# Patient Record
Sex: MALE | Race: WHITE | Employment: FULL TIME | URBAN - METROPOLITAN AREA
[De-identification: names, ages, dates, MRNs, and addresses within clinical notes are randomized per-mention and may not be internally consistent; named-entity substitution may affect disease eponyms.]

---

## 2017-01-06 ENCOUNTER — OFFICE VISIT (OUTPATIENT)
Dept: FAMILY MEDICINE CLINIC | Facility: CLINIC | Age: 37
End: 2017-01-06

## 2017-01-06 VITALS
WEIGHT: 237 LBS | HEART RATE: 77 BPM | BODY MASS INDEX: 35 KG/M2 | SYSTOLIC BLOOD PRESSURE: 156 MMHG | DIASTOLIC BLOOD PRESSURE: 90 MMHG | TEMPERATURE: 99 F | OXYGEN SATURATION: 98 % | RESPIRATION RATE: 12 BRPM

## 2017-01-06 DIAGNOSIS — J03.90 TONSILLITIS: ICD-10-CM

## 2017-01-06 DIAGNOSIS — J02.9 SORE THROAT: Primary | ICD-10-CM

## 2017-01-06 DIAGNOSIS — I10 ESSENTIAL HYPERTENSION, BENIGN: ICD-10-CM

## 2017-01-06 DIAGNOSIS — H65.92 OTITIS MEDIA WITH EFFUSION, LEFT: ICD-10-CM

## 2017-01-06 PROCEDURE — 99213 OFFICE O/P EST LOW 20 MIN: CPT | Performed by: NURSE PRACTITIONER

## 2017-01-06 RX ORDER — FLUTICASONE PROPIONATE 50 MCG
1 SPRAY, SUSPENSION (ML) NASAL 2 TIMES DAILY
Qty: 1 BOTTLE | Refills: 1 | Status: SHIPPED | OUTPATIENT
Start: 2017-01-06 | End: 2017-02-05

## 2017-01-06 RX ORDER — AMOXICILLIN 875 MG/1
875 TABLET, COATED ORAL 2 TIMES DAILY
Qty: 20 TABLET | Refills: 0 | Status: SHIPPED | OUTPATIENT
Start: 2017-01-06 | End: 2017-01-16

## 2017-01-06 NOTE — PATIENT INSTRUCTIONS
·  PLAN: Amoxicillin,take as directed. Finish all the medication even if you feel better. · Probiotics daily during antibiotic use will help decrease stomach upset and restore good bacteria to the gut. · Salt water gargles (1 tsp.  Salt in 6 oz lukewarm

## 2017-01-06 NOTE — PROGRESS NOTES
HPI:   Neel Shipley is a 31year old male who presents with ill symptoms for  3  weeks. He was seen in our office on 12/26 with URI complaints and was sent to the ER for hypertension, not controlled, no medications.  He was started on Metoprolol and adv nausea or abdominal pain, appetite down  NEURO: admits to headaches    EXAM:   /98 mmHg  Pulse 77  Temp(Src) 98.7 °F (37.1 °C) (Oral)  Resp 12  Wt 237 lb  SpO2 98%  GENERAL: well developed, well nourished,in no apparent distress, appears congested  H congestion in nose. · Hydrate! (cold or hot based on comfort). Drink lots of water or other non dehydrating liquids to help with illness. Salty foods and soups can help with throat pain and swelling as well.    · Hand washing-use hand  or wash ha

## 2017-01-13 ENCOUNTER — PRIOR ORIGINAL RECORDS (OUTPATIENT)
Dept: OTHER | Age: 37
End: 2017-01-13

## 2017-01-17 ENCOUNTER — PRIOR ORIGINAL RECORDS (OUTPATIENT)
Dept: OTHER | Age: 37
End: 2017-01-17

## 2017-01-31 ENCOUNTER — MYAURORA ACCOUNT LINK (OUTPATIENT)
Dept: OTHER | Age: 37
End: 2017-01-31

## 2017-01-31 ENCOUNTER — HOSPITAL ENCOUNTER (OUTPATIENT)
Dept: CV DIAGNOSTICS | Facility: HOSPITAL | Age: 37
Discharge: HOME | End: 2017-01-31
Attending: INTERNAL MEDICINE

## 2017-01-31 DIAGNOSIS — I10 BENIGN HYPERTENSION: ICD-10-CM

## 2017-01-31 DIAGNOSIS — R06.00 DYSPNEA, UNSPECIFIED TYPE: ICD-10-CM

## 2017-02-16 ENCOUNTER — OFFICE VISIT (OUTPATIENT)
Dept: FAMILY MEDICINE CLINIC | Facility: CLINIC | Age: 37
End: 2017-02-16

## 2017-02-16 VITALS
RESPIRATION RATE: 20 BRPM | SYSTOLIC BLOOD PRESSURE: 130 MMHG | DIASTOLIC BLOOD PRESSURE: 100 MMHG | HEIGHT: 70 IN | TEMPERATURE: 98 F | WEIGHT: 235 LBS | OXYGEN SATURATION: 98 % | BODY MASS INDEX: 33.64 KG/M2 | HEART RATE: 78 BPM

## 2017-02-16 DIAGNOSIS — J01.90 ACUTE SINUSITIS TREATED WITH ANTIBIOTICS IN THE PAST 60 DAYS: ICD-10-CM

## 2017-02-16 DIAGNOSIS — J20.9 BRONCHITIS WITH BRONCHOSPASM: Primary | ICD-10-CM

## 2017-02-16 PROCEDURE — 99213 OFFICE O/P EST LOW 20 MIN: CPT | Performed by: NURSE PRACTITIONER

## 2017-02-16 RX ORDER — ALBUTEROL SULFATE 90 UG/1
2 AEROSOL, METERED RESPIRATORY (INHALATION) EVERY 4 HOURS PRN
Qty: 1 INHALER | Refills: 0 | Status: SHIPPED | OUTPATIENT
Start: 2017-02-16 | End: 2017-03-02

## 2017-02-16 RX ORDER — LISINOPRIL 10 MG/1
TABLET ORAL
Refills: 6 | COMMUNITY
Start: 2017-02-07 | End: 2019-03-20

## 2017-02-16 RX ORDER — CODEINE PHOSPHATE AND GUAIFENESIN 10; 100 MG/5ML; MG/5ML
5 SOLUTION ORAL NIGHTLY PRN
Qty: 118 ML | Refills: 0 | Status: SHIPPED | OUTPATIENT
Start: 2017-02-16 | End: 2017-02-23

## 2017-02-16 RX ORDER — PREDNISONE 20 MG/1
20 TABLET ORAL 2 TIMES DAILY
Qty: 10 TABLET | Refills: 0 | Status: SHIPPED | OUTPATIENT
Start: 2017-02-16 | End: 2017-02-21

## 2017-02-16 RX ORDER — DOXYCYCLINE HYCLATE 100 MG
100 TABLET ORAL 2 TIMES DAILY
Qty: 14 TABLET | Refills: 0 | Status: SHIPPED | OUTPATIENT
Start: 2017-02-16 | End: 2017-02-23

## 2017-02-16 RX ORDER — BENZONATATE 200 MG/1
200 CAPSULE ORAL 3 TIMES DAILY PRN
Qty: 20 CAPSULE | Refills: 0 | Status: SHIPPED | OUTPATIENT
Start: 2017-02-16 | End: 2017-02-23

## 2017-02-16 NOTE — PATIENT INSTRUCTIONS
Humidifier in room  Sleep propped  Push fluids  Limit dairy  Mucinex as directed  Continue nasal spray    Bronchitis, Antibiotic Treatment (Adult)    Bronchitis is an infection of the air passages (bronchial tubes) in your lungs.  It often occurs when you · Over-the-counter cough, cold, and sore-throat medicines will not shorten the length of the illness, but they may be helpful to reduce symptoms. (Note: Do not use decongestants if you have high blood pressure.)  · Finish all antibiotic medicine.  Do this e Bronchospasm is due to irritation, inflammation, or allergic reaction of the airways. People with asthma get bronchospasm. However, not everyone with bronchospasm has asthma.   Being exposed to harmful fumes, a recent case of bronchitis, exercise, or a flar · You are coughing up lots of dark-colored sputum (mucus). · You do not start to improve within 24 hours.   Call 911, or get immediate medical care  Contact emergency services if any of these occur:  · Coughing up bloody sputum (mucus)  · Chest pain with e · Over-the-counter decongestants may be used unless a similar medicine was prescribed. Nasal sprays work the fastest. Use one that contains phenylephrine or oxymetazoline. First blow the nose gently. Then use the spray.  Do not use these medicines more ofte © 2205-4962 [address], [address] [place]. All rights reserved. This information is not intended as a substitute for professional medical care. Always follow your healthcare professional's instructions.

## 2017-02-16 NOTE — PROGRESS NOTES
CHIEF COMPLAINT:   Patient presents with:  Chest Congestion: sinus pressure, post nasal drip and coughing x 12/25 on/off        HPI:   Verenice Smart is a 31year old male who presents for cough for  6  weeks.   Cough started gradually and is described as Smokeless Status: Never Used                        Comment: pt smoked for 15 years    Alcohol Use: Yes           2.0 - 3.0 oz/week       4-6 Cans of beer per week       REVIEW OF SYSTEMS:   GENERAL: no fever  SKIN: No rashes, or other skin lesions.    EY Sig: Take 1 capsule (200 mg total) by mouth 3 (three) times daily as needed. guaiFENesin-codeine (CHERATUSSIN AC) 100-10 MG/5ML Oral Solution 118 mL 0      Sig: Take 5 mL by mouth nightly as needed for cough.       Doxycycline Hyclate 100 MG Oral Ta · You may use over-the-counter medicines to control fever or pain, unless another medicine was prescribed.  (Note: If you have chronic liver or kidney disease or have ever had a stomach ulcer or gastrointestinal bleeding, talk with your healthcare provider · Worsening weakness, drowsiness, headache, or stiff neck  · Trouble breathing, wheezing, or pain with breathing  Date Last Reviewed: 9/13/2015  © 2683-3493 The [address] [place] [address], [address] [place] [address]. All rights reserved.  This inf · If you were given an inhaler, use it exactly as directed. If you need to use it more often than prescribed, your condition may be getting worse. Contact your healthcare provider. Follow-up care  Follow up with your healthcare provider, or as advised.   Pastor Lagos · Take the full course of antibiotics as instructed. Do not stop taking them, even if you feel better. · Drink plenty of water, hot tea, and other liquids. This may help thin mucus. It also may promote sinus drainage.   · Heat may help soothe painful areas Call your healthcare provider if any of these occur:  · Facial pain or headache becoming more severe  · Stiff neck  · Unusual drowsiness or confusion  · Swelling of the forehead or eyelids  · Vision problems, including blurred or double vision  · Fever of

## 2017-03-03 ENCOUNTER — PRIOR ORIGINAL RECORDS (OUTPATIENT)
Dept: OTHER | Age: 37
End: 2017-03-03

## 2017-03-03 ENCOUNTER — HOSPITAL ENCOUNTER (OUTPATIENT)
Dept: GENERAL RADIOLOGY | Facility: HOSPITAL | Age: 37
Discharge: HOME | End: 2017-03-03
Attending: INTERNAL MEDICINE
Payer: COMMERCIAL

## 2017-03-03 ENCOUNTER — MYAURORA ACCOUNT LINK (OUTPATIENT)
Dept: OTHER | Age: 37
End: 2017-03-03

## 2017-03-03 ENCOUNTER — APPOINTMENT (OUTPATIENT)
Dept: SLEEP CENTER | Facility: HOSPITAL | Age: 37
End: 2017-03-03
Attending: INTERNAL MEDICINE
Payer: COMMERCIAL

## 2017-03-03 DIAGNOSIS — R06.00 DYSPNEA, PAROXYSMAL NOCTURNAL: ICD-10-CM

## 2017-03-03 DIAGNOSIS — R05.9 COUGH: ICD-10-CM

## 2017-03-03 PROCEDURE — 71020 XR CHEST PA + LAT CHEST (CPT=71020): CPT

## 2017-03-06 ENCOUNTER — LAB ENCOUNTER (OUTPATIENT)
Dept: LAB | Age: 37
End: 2017-03-06
Attending: INTERNAL MEDICINE
Payer: COMMERCIAL

## 2017-03-06 DIAGNOSIS — E78.2 MIXED HYPERLIPIDEMIA: ICD-10-CM

## 2017-03-06 DIAGNOSIS — I10 ESSENTIAL HYPERTENSION, MALIGNANT: Primary | ICD-10-CM

## 2017-03-06 PROCEDURE — 80061 LIPID PANEL: CPT

## 2017-03-06 PROCEDURE — 36415 COLL VENOUS BLD VENIPUNCTURE: CPT

## 2017-03-06 PROCEDURE — 84443 ASSAY THYROID STIM HORMONE: CPT

## 2017-03-06 PROCEDURE — 80053 COMPREHEN METABOLIC PANEL: CPT

## 2017-03-07 LAB
ALBUMIN SERPL-MCNC: 4.4 G/DL (ref 3.5–4.8)
ALP LIVER SERPL-CCNC: 72 U/L (ref 45–117)
ALT SERPL-CCNC: 147 U/L (ref 17–63)
AST SERPL-CCNC: 57 U/L (ref 15–41)
BILIRUB SERPL-MCNC: 0.5 MG/DL (ref 0.1–2)
BUN BLD-MCNC: 9 MG/DL (ref 8–20)
CALCIUM BLD-MCNC: 9.4 MG/DL (ref 8.3–10.3)
CHLORIDE: 109 MMOL/L (ref 101–111)
CHOLEST SMN-MCNC: 277 MG/DL (ref ?–200)
CO2: 24 MMOL/L (ref 22–32)
CREAT BLD-MCNC: 0.95 MG/DL (ref 0.7–1.3)
GLUCOSE BLD-MCNC: 111 MG/DL (ref 70–99)
HDLC SERPL-MCNC: 39 MG/DL (ref 45–?)
HDLC SERPL: 7.1 {RATIO} (ref ?–4.97)
LDLC SERPL CALC-MCNC: 196 MG/DL (ref ?–130)
M PROTEIN MFR SERPL ELPH: 7.5 G/DL (ref 6.1–8.3)
NONHDLC SERPL-MCNC: 238 MG/DL (ref ?–130)
POTASSIUM SERPL-SCNC: 4.3 MMOL/L (ref 3.6–5.1)
SODIUM SERPL-SCNC: 142 MMOL/L (ref 136–144)
TRIGLYCERIDES: 209 MG/DL (ref ?–150)
TSI SER-ACNC: 1.74 MIU/ML (ref 0.35–5.5)
VLDL: 42 MG/DL (ref 5–40)

## 2017-03-08 ENCOUNTER — PRIOR ORIGINAL RECORDS (OUTPATIENT)
Dept: OTHER | Age: 37
End: 2017-03-08

## 2017-03-20 ENCOUNTER — PRIOR ORIGINAL RECORDS (OUTPATIENT)
Dept: OTHER | Age: 37
End: 2017-03-20

## 2019-03-01 VITALS
WEIGHT: 234 LBS | SYSTOLIC BLOOD PRESSURE: 156 MMHG | BODY MASS INDEX: 34.66 KG/M2 | HEIGHT: 69 IN | HEART RATE: 76 BPM | DIASTOLIC BLOOD PRESSURE: 102 MMHG

## 2019-03-01 VITALS
HEART RATE: 76 BPM | SYSTOLIC BLOOD PRESSURE: 158 MMHG | WEIGHT: 236 LBS | HEIGHT: 69 IN | BODY MASS INDEX: 34.96 KG/M2 | DIASTOLIC BLOOD PRESSURE: 106 MMHG

## 2019-03-20 ENCOUNTER — OFFICE VISIT (OUTPATIENT)
Dept: FAMILY MEDICINE CLINIC | Facility: CLINIC | Age: 39
End: 2019-03-20
Payer: COMMERCIAL

## 2019-03-20 VITALS
DIASTOLIC BLOOD PRESSURE: 98 MMHG | RESPIRATION RATE: 20 BRPM | WEIGHT: 240 LBS | OXYGEN SATURATION: 99 % | HEIGHT: 70 IN | HEART RATE: 98 BPM | BODY MASS INDEX: 34.36 KG/M2 | SYSTOLIC BLOOD PRESSURE: 178 MMHG | TEMPERATURE: 98 F

## 2019-03-20 DIAGNOSIS — J02.9 SORE THROAT: Primary | ICD-10-CM

## 2019-03-20 DIAGNOSIS — Z20.818 EXPOSURE TO STREP THROAT: ICD-10-CM

## 2019-03-20 DIAGNOSIS — R09.81 SINUS CONGESTION: ICD-10-CM

## 2019-03-20 DIAGNOSIS — I10 ESSENTIAL HYPERTENSION: ICD-10-CM

## 2019-03-20 DIAGNOSIS — H69.83 DYSFUNCTION OF BOTH EUSTACHIAN TUBES: ICD-10-CM

## 2019-03-20 LAB
CONTROL LINE PRESENT WITH A CLEAR BACKGROUND (YES/NO): YES YES/NO
STREP GRP A CUL-SCR: NEGATIVE

## 2019-03-20 PROCEDURE — 99213 OFFICE O/P EST LOW 20 MIN: CPT | Performed by: NURSE PRACTITIONER

## 2019-03-20 PROCEDURE — 87880 STREP A ASSAY W/OPTIC: CPT | Performed by: NURSE PRACTITIONER

## 2019-03-20 RX ORDER — AZITHROMYCIN 250 MG/1
TABLET, FILM COATED ORAL
Qty: 6 TABLET | Refills: 0 | Status: SHIPPED | OUTPATIENT
Start: 2019-03-20 | End: 2021-03-11 | Stop reason: ALTCHOICE

## 2019-03-20 RX ORDER — FLUTICASONE PROPIONATE 50 MCG
1 SPRAY, SUSPENSION (ML) NASAL 2 TIMES DAILY
Qty: 1 BOTTLE | Refills: 1 | Status: SHIPPED | OUTPATIENT
Start: 2019-03-20 | End: 2019-04-19

## 2019-03-20 NOTE — PROGRESS NOTES
HPI:   Nanette Schreiber is a 42year old male who presents with ill symptoms for  3  days. Patient reports sore throat, congestion, low grade fever, ear pain, sinus pain, OTC cold meds have not been helping.  Exposure to strep through daughter three days pr HEENT: atraumatic, normocephalic,ears very full and clear, nares with mild rhinorrhea, throat with mild erythema and uvular/tonsillar edema. Uvuvla midline. No sinus tenderness with palpation.   NECK: supple,positive anterior cervical adenopathy  LUNGS: cl ·  PLAN: Zithromax, take as directed. Finish all the medication even if you feel better. · Probiotics or yogurt daily during antibiotic use will help decrease stomach upset and restore good bacteria to the gut.  Take with meals but separate from Zithromax Eating for your heart doesn’t have to be hard or boring. You just need to know how to make healthier choices. The DASH eating plan has been developed to help you do just that. DASH stands for Dietary Approaches to Stop Hypertension.  It is a plan that has b Best choices: Lean poultry and fish. Trim away visible fat. Broil, grill, roast, or boil instead of frying. Remove skin from poultry before eating.  Limit how much red meat you eat.  Nuts, seeds, beans  Servings: 4 to 5 a week  A serving is:  · One-third cu

## 2019-03-20 NOTE — PATIENT INSTRUCTIONS
·  PLAN: Zithromax, take as directed. Finish all the medication even if you feel better. · Probiotics or yogurt daily during antibiotic use will help decrease stomach upset and restore good bacteria to the gut.  Take with meals but separate from Zithromax lower your risk for cancer, heart disease, osteoporosis, and diabetes. Choosing from each food group  Choose foods from each of the food groups below each day. Try to get the recommended number of servings for each food group.  The serving numbers are base added, lentils, kidney beans, garbanzo beans, and whole mancia beans.    Fats and oils  Servings: 2 to 3 a day  A serving is:  · 1 teaspoon vegetable oil  · 1 teaspoon soft margarine  · 1 tablespoon mayonnaise  · 2 tablespoons salad dressing  Best choices: N

## 2021-03-11 ENCOUNTER — APPOINTMENT (OUTPATIENT)
Dept: CT IMAGING | Facility: HOSPITAL | Age: 41
End: 2021-03-11
Attending: EMERGENCY MEDICINE
Payer: COMMERCIAL

## 2021-03-11 ENCOUNTER — HOSPITAL ENCOUNTER (EMERGENCY)
Facility: HOSPITAL | Age: 41
Discharge: HOME | End: 2021-03-11
Attending: EMERGENCY MEDICINE
Payer: COMMERCIAL

## 2021-03-11 VITALS
RESPIRATION RATE: 21 BRPM | TEMPERATURE: 98 F | SYSTOLIC BLOOD PRESSURE: 173 MMHG | OXYGEN SATURATION: 96 % | HEIGHT: 70 IN | WEIGHT: 255 LBS | HEART RATE: 82 BPM | BODY MASS INDEX: 36.51 KG/M2 | DIASTOLIC BLOOD PRESSURE: 118 MMHG

## 2021-03-11 DIAGNOSIS — R51.9 ACUTE NONINTRACTABLE HEADACHE, UNSPECIFIED HEADACHE TYPE: Primary | ICD-10-CM

## 2021-03-11 DIAGNOSIS — I10 ESSENTIAL HYPERTENSION: ICD-10-CM

## 2021-03-11 LAB
ALBUMIN SERPL-MCNC: 4.3 G/DL (ref 3.4–5)
ALBUMIN/GLOB SERPL: 1.2 {RATIO} (ref 1–2)
ALP LIVER SERPL-CCNC: 82 U/L
ALT SERPL-CCNC: 93 U/L
ANION GAP SERPL CALC-SCNC: 7 MMOL/L (ref 0–18)
AST SERPL-CCNC: 43 U/L (ref 15–37)
BASOPHILS # BLD AUTO: 0.14 X10(3) UL (ref 0–0.2)
BASOPHILS NFR BLD AUTO: 1.1 %
BILIRUB SERPL-MCNC: 0.8 MG/DL (ref 0.1–2)
BUN BLD-MCNC: 16 MG/DL (ref 7–18)
BUN/CREAT SERPL: 16.3 (ref 10–20)
CALCIUM BLD-MCNC: 9.4 MG/DL (ref 8.5–10.1)
CHLORIDE SERPL-SCNC: 108 MMOL/L (ref 98–112)
CO2 SERPL-SCNC: 24 MMOL/L (ref 21–32)
CREAT BLD-MCNC: 0.98 MG/DL
DEPRECATED RDW RBC AUTO: 37.8 FL (ref 35.1–46.3)
EOSINOPHIL # BLD AUTO: 0.06 X10(3) UL (ref 0–0.7)
EOSINOPHIL NFR BLD AUTO: 0.5 %
ERYTHROCYTE [DISTWIDTH] IN BLOOD BY AUTOMATED COUNT: 13 % (ref 11–15)
GLOBULIN PLAS-MCNC: 3.7 G/DL (ref 2.8–4.4)
GLUCOSE BLD-MCNC: 126 MG/DL (ref 70–99)
HCT VFR BLD AUTO: 41.3 %
HGB BLD-MCNC: 14.9 G/DL
IMM GRANULOCYTES # BLD AUTO: 0.09 X10(3) UL (ref 0–1)
IMM GRANULOCYTES NFR BLD: 0.7 %
LYMPHOCYTES # BLD AUTO: 1.12 X10(3) UL (ref 1–4)
LYMPHOCYTES NFR BLD AUTO: 9.1 %
M PROTEIN MFR SERPL ELPH: 8 G/DL (ref 6.4–8.2)
MCH RBC QN AUTO: 29.2 PG (ref 26–34)
MCHC RBC AUTO-ENTMCNC: 36.1 G/DL (ref 31–37)
MCV RBC AUTO: 80.8 FL
MONOCYTES # BLD AUTO: 0.63 X10(3) UL (ref 0.1–1)
MONOCYTES NFR BLD AUTO: 5.1 %
NEUTROPHILS # BLD AUTO: 10.21 X10 (3) UL (ref 1.5–7.7)
NEUTROPHILS # BLD AUTO: 10.21 X10(3) UL (ref 1.5–7.7)
NEUTROPHILS NFR BLD AUTO: 83.5 %
OSMOLALITY SERPL CALC.SUM OF ELEC: 291 MOSM/KG (ref 275–295)
PLATELET # BLD AUTO: 231 10(3)UL (ref 150–450)
POTASSIUM SERPL-SCNC: 3.6 MMOL/L (ref 3.5–5.1)
RBC # BLD AUTO: 5.11 X10(6)UL
SODIUM SERPL-SCNC: 139 MMOL/L (ref 136–145)
WBC # BLD AUTO: 12.3 X10(3) UL (ref 4–11)

## 2021-03-11 PROCEDURE — 96375 TX/PRO/DX INJ NEW DRUG ADDON: CPT

## 2021-03-11 PROCEDURE — 80053 COMPREHEN METABOLIC PANEL: CPT | Performed by: EMERGENCY MEDICINE

## 2021-03-11 PROCEDURE — 93010 ELECTROCARDIOGRAM REPORT: CPT

## 2021-03-11 PROCEDURE — 93005 ELECTROCARDIOGRAM TRACING: CPT

## 2021-03-11 PROCEDURE — 85025 COMPLETE CBC W/AUTO DIFF WBC: CPT | Performed by: EMERGENCY MEDICINE

## 2021-03-11 PROCEDURE — 96374 THER/PROPH/DIAG INJ IV PUSH: CPT

## 2021-03-11 PROCEDURE — 70450 CT HEAD/BRAIN W/O DYE: CPT | Performed by: EMERGENCY MEDICINE

## 2021-03-11 PROCEDURE — 96376 TX/PRO/DX INJ SAME DRUG ADON: CPT

## 2021-03-11 PROCEDURE — 99285 EMERGENCY DEPT VISIT HI MDM: CPT

## 2021-03-11 RX ORDER — LABETALOL HYDROCHLORIDE 5 MG/ML
20 INJECTION, SOLUTION INTRAVENOUS ONCE
Status: DISCONTINUED | OUTPATIENT
Start: 2021-03-11 | End: 2021-03-11

## 2021-03-11 RX ORDER — CLONIDINE HYDROCHLORIDE 0.1 MG/1
0.05 TABLET ORAL ONCE
Status: COMPLETED | OUTPATIENT
Start: 2021-03-11 | End: 2021-03-11

## 2021-03-11 RX ORDER — LOSARTAN POTASSIUM 50 MG/1
50 TABLET ORAL EVERY EVENING
Qty: 30 TABLET | Refills: 0 | Status: ON HOLD | OUTPATIENT
Start: 2021-03-11 | End: 2021-03-14

## 2021-03-11 RX ORDER — LABETALOL HYDROCHLORIDE 5 MG/ML
10 INJECTION, SOLUTION INTRAVENOUS ONCE
Status: COMPLETED | OUTPATIENT
Start: 2021-03-11 | End: 2021-03-11

## 2021-03-11 RX ORDER — AMLODIPINE BESYLATE 5 MG/1
5 TABLET ORAL DAILY
Qty: 30 TABLET | Refills: 0 | Status: ON HOLD | OUTPATIENT
Start: 2021-03-11 | End: 2021-03-14

## 2021-03-11 RX ORDER — ONDANSETRON 2 MG/ML
4 INJECTION INTRAMUSCULAR; INTRAVENOUS ONCE
Status: COMPLETED | OUTPATIENT
Start: 2021-03-11 | End: 2021-03-11

## 2021-03-11 RX ORDER — CLONIDINE HYDROCHLORIDE 0.1 MG/1
0.1 TABLET ORAL ONCE
Status: DISCONTINUED | OUTPATIENT
Start: 2021-03-11 | End: 2021-03-11

## 2021-03-11 NOTE — ED NOTES
Pt states that he has had elevated blood pressure in the past but BP meds have not worked so he stopped taking anything or having bp evaluated.

## 2021-03-11 NOTE — ED INITIAL ASSESSMENT (HPI)
Patient states 3-4 weeks ago had sensitivity to light and left hand weakness. Thought it was a migraine. Now c/o constant pain/pressure behind both eyes. Bowling yesterday and was losing his balance.  This morning again having balance issues, and vomited tw

## 2021-03-12 ENCOUNTER — TELEPHONE (OUTPATIENT)
Dept: FAMILY MEDICINE CLINIC | Facility: CLINIC | Age: 41
End: 2021-03-12

## 2021-03-12 ENCOUNTER — APPOINTMENT (OUTPATIENT)
Dept: MRI IMAGING | Age: 41
DRG: 064 | End: 2021-03-12
Attending: EMERGENCY MEDICINE
Payer: COMMERCIAL

## 2021-03-12 ENCOUNTER — OFFICE VISIT (OUTPATIENT)
Dept: FAMILY MEDICINE CLINIC | Facility: CLINIC | Age: 41
End: 2021-03-12
Payer: COMMERCIAL

## 2021-03-12 ENCOUNTER — HOSPITAL ENCOUNTER (INPATIENT)
Facility: HOSPITAL | Age: 41
LOS: 3 days | Discharge: HOME | DRG: 064 | End: 2021-03-15
Attending: EMERGENCY MEDICINE | Admitting: HOSPITALIST
Payer: COMMERCIAL

## 2021-03-12 DIAGNOSIS — I63.9 FOCAL INFARCTION OF BRAIN (HCC): ICD-10-CM

## 2021-03-12 DIAGNOSIS — Z02.9 ADMINISTRATIVE ENCOUNTER: Primary | ICD-10-CM

## 2021-03-12 DIAGNOSIS — N28.9 RENAL INSUFFICIENCY: ICD-10-CM

## 2021-03-12 DIAGNOSIS — I16.1 HYPERTENSIVE EMERGENCY: Primary | ICD-10-CM

## 2021-03-12 LAB
ALBUMIN SERPL-MCNC: 4.1 G/DL (ref 3.4–5)
ALBUMIN/GLOB SERPL: 1.2 {RATIO} (ref 1–2)
ALP LIVER SERPL-CCNC: 74 U/L
ALT SERPL-CCNC: 93 U/L
ANION GAP SERPL CALC-SCNC: 6 MMOL/L (ref 0–18)
AST SERPL-CCNC: 41 U/L (ref 15–37)
ATRIAL RATE: 106 BPM
ATRIAL RATE: 97 BPM
BASOPHILS # BLD AUTO: 0.13 X10(3) UL (ref 0–0.2)
BASOPHILS NFR BLD AUTO: 1.1 %
BILIRUB SERPL-MCNC: 0.8 MG/DL (ref 0.1–2)
BUN BLD-MCNC: 18 MG/DL (ref 7–18)
BUN/CREAT SERPL: 10.4 (ref 10–20)
CALCIUM BLD-MCNC: 9.4 MG/DL (ref 8.5–10.1)
CHLORIDE SERPL-SCNC: 103 MMOL/L (ref 98–112)
CO2 SERPL-SCNC: 28 MMOL/L (ref 21–32)
CREAT BLD-MCNC: 1.73 MG/DL
DEPRECATED RDW RBC AUTO: 39.8 FL (ref 35.1–46.3)
EOSINOPHIL # BLD AUTO: 0.24 X10(3) UL (ref 0–0.7)
EOSINOPHIL NFR BLD AUTO: 2 %
ERYTHROCYTE [DISTWIDTH] IN BLOOD BY AUTOMATED COUNT: 13.2 % (ref 11–15)
GLOBULIN PLAS-MCNC: 3.5 G/DL (ref 2.8–4.4)
GLUCOSE BLD-MCNC: 126 MG/DL (ref 70–99)
HCT VFR BLD AUTO: 40.9 %
HGB BLD-MCNC: 14 G/DL
IMM GRANULOCYTES # BLD AUTO: 0.06 X10(3) UL (ref 0–1)
IMM GRANULOCYTES NFR BLD: 0.5 %
LYMPHOCYTES # BLD AUTO: 2.46 X10(3) UL (ref 1–4)
LYMPHOCYTES NFR BLD AUTO: 20.3 %
M PROTEIN MFR SERPL ELPH: 7.6 G/DL (ref 6.4–8.2)
MCH RBC QN AUTO: 28.6 PG (ref 26–34)
MCHC RBC AUTO-ENTMCNC: 34.2 G/DL (ref 31–37)
MCV RBC AUTO: 83.6 FL
MONOCYTES # BLD AUTO: 0.78 X10(3) UL (ref 0.1–1)
MONOCYTES NFR BLD AUTO: 6.4 %
NEUTROPHILS # BLD AUTO: 8.47 X10 (3) UL (ref 1.5–7.7)
NEUTROPHILS # BLD AUTO: 8.47 X10(3) UL (ref 1.5–7.7)
NEUTROPHILS NFR BLD AUTO: 69.7 %
OSMOLALITY SERPL CALC.SUM OF ELEC: 287 MOSM/KG (ref 275–295)
P AXIS: 61 DEGREES
P AXIS: 66 DEGREES
P-R INTERVAL: 160 MS
P-R INTERVAL: 174 MS
PLATELET # BLD AUTO: 228 10(3)UL (ref 150–450)
POTASSIUM SERPL-SCNC: 3.6 MMOL/L (ref 3.5–5.1)
Q-T INTERVAL: 356 MS
Q-T INTERVAL: 366 MS
QRS DURATION: 100 MS
QRS DURATION: 92 MS
QTC CALCULATION (BEZET): 464 MS
QTC CALCULATION (BEZET): 472 MS
R AXIS: -3 DEGREES
R AXIS: 1 DEGREES
RBC # BLD AUTO: 4.89 X10(6)UL
SARS-COV-2 RNA RESP QL NAA+PROBE: NOT DETECTED
SODIUM SERPL-SCNC: 137 MMOL/L (ref 136–145)
T AXIS: 94 DEGREES
T AXIS: 96 DEGREES
TROPONIN I SERPL-MCNC: <0.045 NG/ML (ref ?–0.04)
VENTRICULAR RATE: 106 BPM
VENTRICULAR RATE: 97 BPM
WBC # BLD AUTO: 12.1 X10(3) UL (ref 4–11)

## 2021-03-12 PROCEDURE — 99223 1ST HOSP IP/OBS HIGH 75: CPT | Performed by: INTERNAL MEDICINE

## 2021-03-12 PROCEDURE — 70553 MRI BRAIN STEM W/O & W/DYE: CPT | Performed by: EMERGENCY MEDICINE

## 2021-03-12 RX ORDER — ATORVASTATIN CALCIUM 40 MG/1
40 TABLET, FILM COATED ORAL NIGHTLY
Status: DISCONTINUED | OUTPATIENT
Start: 2021-03-12 | End: 2021-03-15

## 2021-03-12 RX ORDER — LORAZEPAM 2 MG/ML
1 INJECTION INTRAMUSCULAR ONCE
Status: COMPLETED | OUTPATIENT
Start: 2021-03-12 | End: 2021-03-12

## 2021-03-12 RX ORDER — HYDRALAZINE HYDROCHLORIDE 20 MG/ML
10 INJECTION INTRAMUSCULAR; INTRAVENOUS EVERY 6 HOURS PRN
Status: DISCONTINUED | OUTPATIENT
Start: 2021-03-12 | End: 2021-03-15

## 2021-03-12 RX ORDER — LABETALOL HYDROCHLORIDE 5 MG/ML
20 INJECTION, SOLUTION INTRAVENOUS ONCE
Status: COMPLETED | OUTPATIENT
Start: 2021-03-12 | End: 2021-03-12

## 2021-03-12 RX ORDER — LOSARTAN POTASSIUM 100 MG/1
100 TABLET ORAL DAILY
Status: DISCONTINUED | OUTPATIENT
Start: 2021-03-12 | End: 2021-03-15

## 2021-03-12 RX ORDER — ONDANSETRON 2 MG/ML
4 INJECTION INTRAMUSCULAR; INTRAVENOUS EVERY 6 HOURS PRN
Status: DISCONTINUED | OUTPATIENT
Start: 2021-03-12 | End: 2021-03-15

## 2021-03-12 RX ORDER — ASPIRIN 325 MG
325 TABLET ORAL DAILY
Status: DISCONTINUED | OUTPATIENT
Start: 2021-03-12 | End: 2021-03-15

## 2021-03-12 RX ORDER — MECLIZINE HYDROCHLORIDE 25 MG/1
25 TABLET ORAL ONCE
Status: COMPLETED | OUTPATIENT
Start: 2021-03-12 | End: 2021-03-12

## 2021-03-12 RX ORDER — ENOXAPARIN SODIUM 100 MG/ML
40 INJECTION SUBCUTANEOUS DAILY
Status: DISCONTINUED | OUTPATIENT
Start: 2021-03-13 | End: 2021-03-15

## 2021-03-12 RX ORDER — AMLODIPINE BESYLATE 5 MG/1
10 TABLET ORAL ONCE
Status: COMPLETED | OUTPATIENT
Start: 2021-03-12 | End: 2021-03-12

## 2021-03-12 RX ORDER — HYDROCODONE BITARTRATE AND ACETAMINOPHEN 5; 325 MG/1; MG/1
2 TABLET ORAL EVERY 4 HOURS PRN
Status: DISCONTINUED | OUTPATIENT
Start: 2021-03-12 | End: 2021-03-15

## 2021-03-12 RX ORDER — HYDROCODONE BITARTRATE AND ACETAMINOPHEN 5; 325 MG/1; MG/1
1 TABLET ORAL EVERY 4 HOURS PRN
Status: DISCONTINUED | OUTPATIENT
Start: 2021-03-12 | End: 2021-03-15

## 2021-03-12 RX ORDER — AMLODIPINE BESYLATE 5 MG/1
5 TABLET ORAL DAILY
Status: DISCONTINUED | OUTPATIENT
Start: 2021-03-12 | End: 2021-03-14

## 2021-03-12 RX ORDER — HYDROCHLOROTHIAZIDE 12.5 MG/1
12.5 CAPSULE, GELATIN COATED ORAL DAILY
Status: DISCONTINUED | OUTPATIENT
Start: 2021-03-12 | End: 2021-03-14

## 2021-03-12 RX ORDER — ACETAMINOPHEN 325 MG/1
650 TABLET ORAL EVERY 4 HOURS PRN
Status: DISCONTINUED | OUTPATIENT
Start: 2021-03-12 | End: 2021-03-15

## 2021-03-12 RX ORDER — MECLIZINE HYDROCHLORIDE 25 MG/1
25 TABLET ORAL 3 TIMES DAILY PRN
Qty: 20 TABLET | Refills: 0 | Status: SHIPPED | OUTPATIENT
Start: 2021-03-12

## 2021-03-12 RX ORDER — MELATONIN
3 NIGHTLY PRN
Status: DISCONTINUED | OUTPATIENT
Start: 2021-03-12 | End: 2021-03-15

## 2021-03-12 RX ORDER — ASPIRIN 300 MG
300 SUPPOSITORY, RECTAL RECTAL DAILY
Status: DISCONTINUED | OUTPATIENT
Start: 2021-03-12 | End: 2021-03-14

## 2021-03-12 RX ORDER — POTASSIUM CHLORIDE 20 MEQ/1
40 TABLET, EXTENDED RELEASE ORAL EVERY 4 HOURS
Status: COMPLETED | OUTPATIENT
Start: 2021-03-12 | End: 2021-03-13

## 2021-03-12 NOTE — ED PROVIDER NOTES
Patient Seen in: [facility] Emergency Department      History   Patient presents with:  Dizziness    Stated Complaint: Dizziness, N/V    HPI/Subjective:   HPI    46 yr old M hx hypertension here with /169 and mild HA, photosensitivity and feel (36.6 °C) (Temporal)   Resp 21   Ht 177.8 cm (5' 10\")   Wt 115.7 kg   SpO2 96%   BMI 36.59 kg/m²         Physical Exam  Vitals and nursing note reviewed. HENT:      Head: Normocephalic and atraumatic.       Nose: Nose normal.      Mouth/Throat:      Mout order CBC WITH DIFFERENTIAL WITH PLATELET.   Procedure                               Abnormality         Status                     ---------                               -----------         ------                     CBC W/ DIFFERENTIAL[[id number]] probability of imminent or life-threatening deterioration which required my direct attention, intervention and personal management. Critical care time is exclusive of time spent on separately billable procedures.  Time includes review of laboratory data, ra

## 2021-03-12 NOTE — ED PROVIDER NOTES
Patient Seen in: [facility] Emergency Department In [place]      History   Patient presents with:  Dizziness    Stated Complaint: seen at [facility] yesterday for hypertension , still dizzy and nausea  almost like ve*    HPI/Subjective:   HPI    This is a 41-yea Types: 4 - 6 Cans of beer per week    Drug use: No             Review of Systems    Positive for stated complaint: seen at [facility] yesterday for hypertension , still dizzy and nausea  almost like ve*  Other systems are as noted in HPI.   Constitutional and krista Absolute Prelim 8.47 (*)     Neutrophil Absolute 8.47 (*)     All other components within normal limits   TROPONIN I - Normal   CBC WITH DIFFERENTIAL WITH PLATELET    Narrative:      The following orders were created for panel order CBC WITH DIFFERENTIAL WI follow-up needs repeat electrolytes as an outpatient he does not outpatient follow-up with his own primary care physician MRI is pending at the time patient's MRI is negative patient be discharged home with close follow-up.                    Disposition an

## 2021-03-12 NOTE — ED PROVIDER NOTES
Patient's blood pressure trended back elevated      I reviewed the results of the work-up with the patient. I am concerned about his blood pressure trending elevated again. He was not compliant with medication that had been prescribed yesterday.   Also co

## 2021-03-12 NOTE — ED INITIAL ASSESSMENT (HPI)
Seen yesterday at [facility] for dizziness and HTN- bp improved but dizziness and nausea remain- no vomiting today

## 2021-03-12 NOTE — PROGRESS NOTES
Triaged from the car. Pt presented with dizzy and vomiting. Was seen at [facility] yesterday and was found to be extremely hypertensive. Patient reports he is still feeling off balance and vomiting. These symptoms have not resolved from yesterday.   Has not pi

## 2021-03-12 NOTE — TELEPHONE ENCOUNTER
Pt was @ the [facility] ER yesterday and he was told to follow up with PCP within 1-2 days. Dr. Manjit Ramos schedule full when would she like to see him?

## 2021-03-13 ENCOUNTER — APPOINTMENT (OUTPATIENT)
Dept: CV DIAGNOSTICS | Facility: HOSPITAL | Age: 41
DRG: 064 | End: 2021-03-13
Attending: INTERNAL MEDICINE
Payer: COMMERCIAL

## 2021-03-13 ENCOUNTER — APPOINTMENT (OUTPATIENT)
Dept: CT IMAGING | Facility: HOSPITAL | Age: 41
DRG: 064 | End: 2021-03-13
Attending: Other
Payer: COMMERCIAL

## 2021-03-13 ENCOUNTER — APPOINTMENT (OUTPATIENT)
Dept: ULTRASOUND IMAGING | Facility: HOSPITAL | Age: 41
DRG: 064 | End: 2021-03-13
Attending: INTERNAL MEDICINE
Payer: COMMERCIAL

## 2021-03-13 LAB
ANION GAP SERPL CALC-SCNC: 8 MMOL/L (ref 0–18)
BASOPHILS # BLD AUTO: 0.12 X10(3) UL (ref 0–0.2)
BASOPHILS NFR BLD AUTO: 1.2 %
BUN BLD-MCNC: 14 MG/DL (ref 7–18)
BUN/CREAT SERPL: 13.2 (ref 10–20)
CALCIUM BLD-MCNC: 8.9 MG/DL (ref 8.5–10.1)
CHLORIDE SERPL-SCNC: 107 MMOL/L (ref 98–112)
CHOLEST SMN-MCNC: 264 MG/DL (ref ?–200)
CO2 SERPL-SCNC: 25 MMOL/L (ref 21–32)
CREAT BLD-MCNC: 1.06 MG/DL
DEPRECATED RDW RBC AUTO: 39.7 FL (ref 35.1–46.3)
EOSINOPHIL # BLD AUTO: 0.23 X10(3) UL (ref 0–0.7)
EOSINOPHIL NFR BLD AUTO: 2.4 %
ERYTHROCYTE [DISTWIDTH] IN BLOOD BY AUTOMATED COUNT: 13.3 % (ref 11–15)
EST. AVERAGE GLUCOSE BLD GHB EST-MCNC: 114 MG/DL (ref 68–126)
GLUCOSE BLD-MCNC: 112 MG/DL (ref 70–99)
GLUCOSE BLD-MCNC: 113 MG/DL (ref 70–99)
GLUCOSE BLD-MCNC: 114 MG/DL (ref 70–99)
GLUCOSE BLD-MCNC: 117 MG/DL (ref 70–99)
GLUCOSE BLD-MCNC: 97 MG/DL (ref 70–99)
HAV IGM SER QL: 2.5 MG/DL (ref 1.6–2.6)
HBA1C MFR BLD HPLC: 5.6 % (ref ?–5.7)
HCT VFR BLD AUTO: 40.9 %
HDLC SERPL-MCNC: 35 MG/DL (ref 40–59)
HGB BLD-MCNC: 14.2 G/DL
IMM GRANULOCYTES # BLD AUTO: 0.04 X10(3) UL (ref 0–1)
IMM GRANULOCYTES NFR BLD: 0.4 %
LDLC SERPL CALC-MCNC: 204 MG/DL (ref ?–100)
LYMPHOCYTES # BLD AUTO: 1.68 X10(3) UL (ref 1–4)
LYMPHOCYTES NFR BLD AUTO: 17.4 %
MCH RBC QN AUTO: 28.9 PG (ref 26–34)
MCHC RBC AUTO-ENTMCNC: 34.7 G/DL (ref 31–37)
MCV RBC AUTO: 83.3 FL
MONOCYTES # BLD AUTO: 0.74 X10(3) UL (ref 0.1–1)
MONOCYTES NFR BLD AUTO: 7.7 %
NEUTROPHILS # BLD AUTO: 6.82 X10 (3) UL (ref 1.5–7.7)
NEUTROPHILS # BLD AUTO: 6.82 X10(3) UL (ref 1.5–7.7)
NEUTROPHILS NFR BLD AUTO: 70.9 %
NONHDLC SERPL-MCNC: 229 MG/DL (ref ?–130)
OSMOLALITY SERPL CALC.SUM OF ELEC: 291 MOSM/KG (ref 275–295)
PLATELET # BLD AUTO: 212 10(3)UL (ref 150–450)
POTASSIUM SERPL-SCNC: 3.9 MMOL/L (ref 3.5–5.1)
RBC # BLD AUTO: 4.91 X10(6)UL
SODIUM SERPL-SCNC: 140 MMOL/L (ref 136–145)
TRIGL SERPL-MCNC: 124 MG/DL (ref 30–149)
VLDLC SERPL CALC-MCNC: 25 MG/DL (ref 0–30)
WBC # BLD AUTO: 9.6 X10(3) UL (ref 4–11)

## 2021-03-13 PROCEDURE — 93975 VASCULAR STUDY: CPT | Performed by: INTERNAL MEDICINE

## 2021-03-13 PROCEDURE — 76775 US EXAM ABDO BACK WALL LIM: CPT | Performed by: INTERNAL MEDICINE

## 2021-03-13 PROCEDURE — 99253 IP/OBS CNSLTJ NEW/EST LOW 45: CPT | Performed by: INTERNAL MEDICINE

## 2021-03-13 PROCEDURE — 70496 CT ANGIOGRAPHY HEAD: CPT | Performed by: OTHER

## 2021-03-13 PROCEDURE — 93306 TTE W/DOPPLER COMPLETE: CPT | Performed by: INTERNAL MEDICINE

## 2021-03-13 PROCEDURE — 70498 CT ANGIOGRAPHY NECK: CPT | Performed by: OTHER

## 2021-03-13 PROCEDURE — 99255 IP/OBS CONSLTJ NEW/EST HI 80: CPT | Performed by: OTHER

## 2021-03-13 PROCEDURE — 99232 SBSQ HOSP IP/OBS MODERATE 35: CPT | Performed by: INTERNAL MEDICINE

## 2021-03-13 RX ORDER — CARVEDILOL 3.12 MG/1
3.12 TABLET ORAL 2 TIMES DAILY WITH MEALS
Status: DISCONTINUED | OUTPATIENT
Start: 2021-03-13 | End: 2021-03-14

## 2021-03-13 NOTE — CONSULTS
[address]  Neurology Consultation    Date of consult: 3/13/2021    Reason for consult: HTN urgency    HPI: Eli Harrison is a 46year old male with past medical history as listed below presents with dizziness, high blood pressure.  The patient infection      Past Surgical History:   Procedure Laterality Date   • VASECTOMY  12/4/14    Dr. Brianna Boudreaux     Social History:  Social History    Tobacco Use      Smoking status: Former Smoker        Types: Cigarettes        Quit date: 2/1/2013        Years si deferred  Romberg:deferred    Data and Notes Reviewed on 3/13/2021  Labs Reviewed:   Recent Labs   Lab 03/13/21  0641   RBC 4.91   HGB 14.2   HCT 40.9   MCV 83.3   MCH 28.9   MCHC 34.7   RDW 13.3   NEPRELIM 6.82   WBC 9.6   .0     Recent Labs   Lab

## 2021-03-13 NOTE — SIGNIFICANT EVENT
Received patient from ED, accompanied by his girlfriend. He was alert and oriented and not showing any signs of distress. He was oriented to room set up. Needs attended.

## 2021-03-13 NOTE — PLAN OF CARE
Problem: Patient/Family Goals  Goal: Patient/Family Long Term Goal  Description: Patient's Long Term Goal: will be able to go home without complication    Interventions:    - See additional Care Plan goals for specific interventions  3/12/2021 2225 by Sergo Guardado INTERVENTIONS:  - Monitor Blood Glucose as ordered  - Assess for signs and symptoms of hyperglycemia and hypoglycemia  - Administer ordered medications to maintain glucose within target range  - Assess barriers to adequate nutritional intake and initiate n

## 2021-03-13 NOTE — PROGRESS NOTES
[facility] HOSPITAL[facility]  Progress Note     Hugo Lopez Patient Status:  Inpatient    1980 MRN [id number]   [facility] 2NE-A Attending Kumar Cam MD   Hosp Day # 1 PCP Bartolo Garcia DO     Chief Complaint: dizziness    S: Patient (based on SCr of 1.06 mg/dL). No results for input(s): PTP, INR in the last 168 hours. Recent Labs   Lab 03/12/21  1212   TROP <0.045            Imaging: Imaging data reviewed in Epic.     Medications:   • amLODIPine Besylate  5 mg Oral Daily   • hydr

## 2021-03-13 NOTE — PHYSICAL THERAPY NOTE
PHYSICAL THERAPY QUICK EVALUATION - INPATIENT    Room Number: 3563/0359-C  Evaluation Date: 3/13/2021  Presenting Problem: hypertensive emergency  Physician Order: PT Eval and Treat    Problem List  Principal Problem:    Hypertensive emergency  Active Pr ASSESSMENT  Upper extremity ROM and strength are within functional limits    Lower extremity ROM is within functional limits     Lower extremity strength is within functional limits  NEUROLOGICAL FINDINGS                      ACTIVITY TOLERANCE           B recommendations, discharge recommendations. Pt left supine with needs met, wife present. RN aware of session findings. Patient End of Session: In bed;Needs met;Call light within reach;RN aware of session/findings; All patient questions and concerns addr

## 2021-03-13 NOTE — H&P
[facility]ITAL[facility]  History and Physical     Nanette Candie Patient Status:  Inpatient    1980 MRN [id number]   [facility] 2NE-A Attending Cinthya Timmons DO   Hosp Day # 0 PCP Giuseppe Lane DO     Chief Complaint: Feeling off ba quit smoking about 8 years ago. His smoking use included cigarettes. He has never used smokeless tobacco. He reports current alcohol use of about 3.3 - 5.0 standard drinks of alcohol per week. He reports that he does not use drugs.     Family History:   Fam Appropriate mood and affect.     Diagnostic Data:      Labs:  Recent Labs   Lab 03/11/21  1229 03/12/21  1212   WBC 12.3* 12.1*   HGB 14.9 14.0   MCV 80.8 83.6   .0 228.0     Recent Labs   Lab 03/11/21  1229 03/12/21  1212   * 126*   BUN 16 18

## 2021-03-14 LAB — GLUCOSE BLD-MCNC: 120 MG/DL (ref 70–99)

## 2021-03-14 PROCEDURE — 99232 SBSQ HOSP IP/OBS MODERATE 35: CPT | Performed by: INTERNAL MEDICINE

## 2021-03-14 PROCEDURE — 99232 SBSQ HOSP IP/OBS MODERATE 35: CPT | Performed by: OTHER

## 2021-03-14 RX ORDER — AMLODIPINE BESYLATE 5 MG/1
10 TABLET ORAL DAILY
Status: DISCONTINUED | OUTPATIENT
Start: 2021-03-15 | End: 2021-03-15

## 2021-03-14 RX ORDER — MECLIZINE HCL 12.5 MG/1
12.5 TABLET ORAL 3 TIMES DAILY PRN
Status: DISCONTINUED | OUTPATIENT
Start: 2021-03-14 | End: 2021-03-15

## 2021-03-14 RX ORDER — CARVEDILOL 3.12 MG/1
3.12 TABLET ORAL ONCE
Status: COMPLETED | OUTPATIENT
Start: 2021-03-14 | End: 2021-03-14

## 2021-03-14 RX ORDER — ATORVASTATIN CALCIUM 40 MG/1
40 TABLET, FILM COATED ORAL NIGHTLY
Qty: 30 TABLET | Refills: 1 | Status: SHIPPED | OUTPATIENT
Start: 2021-03-14 | End: 2021-05-14

## 2021-03-14 RX ORDER — ASPIRIN 325 MG
325 TABLET ORAL DAILY
Qty: 30 TABLET | Refills: 1 | Status: SHIPPED | OUTPATIENT
Start: 2021-03-15 | End: 2021-05-14

## 2021-03-14 RX ORDER — CARVEDILOL 12.5 MG/1
12.5 TABLET ORAL 2 TIMES DAILY WITH MEALS
Status: DISCONTINUED | OUTPATIENT
Start: 2021-03-14 | End: 2021-03-15

## 2021-03-14 RX ORDER — CARVEDILOL 6.25 MG/1
6.25 TABLET ORAL ONCE
Status: DISCONTINUED | OUTPATIENT
Start: 2021-03-14 | End: 2021-03-15

## 2021-03-14 RX ORDER — HYDROCHLOROTHIAZIDE 25 MG/1
25 TABLET ORAL DAILY
Status: DISCONTINUED | OUTPATIENT
Start: 2021-03-15 | End: 2021-03-14

## 2021-03-14 RX ORDER — AMLODIPINE BESYLATE 5 MG/1
5 TABLET ORAL ONCE
Status: COMPLETED | OUTPATIENT
Start: 2021-03-14 | End: 2021-03-14

## 2021-03-14 RX ORDER — HYDROCHLOROTHIAZIDE 50 MG/1
50 TABLET ORAL DAILY
Status: DISCONTINUED | OUTPATIENT
Start: 2021-03-15 | End: 2021-03-15

## 2021-03-14 RX ORDER — HYDROCHLOROTHIAZIDE 25 MG/1
50 TABLET ORAL ONCE
Status: COMPLETED | OUTPATIENT
Start: 2021-03-14 | End: 2021-03-14

## 2021-03-14 RX ORDER — LOSARTAN POTASSIUM 50 MG/1
100 TABLET ORAL EVERY EVENING
Status: SHIPPED | COMMUNITY
Start: 2021-03-14 | End: 2021-04-15

## 2021-03-14 RX ORDER — HYDROCHLOROTHIAZIDE 12.5 MG/1
12.5 CAPSULE, GELATIN COATED ORAL ONCE
Status: COMPLETED | OUTPATIENT
Start: 2021-03-14 | End: 2021-03-14

## 2021-03-14 RX ORDER — AMLODIPINE BESYLATE 5 MG/1
10 TABLET ORAL DAILY
Status: SHIPPED | COMMUNITY
Start: 2021-03-14 | End: 2021-04-15

## 2021-03-14 RX ORDER — CARVEDILOL 6.25 MG/1
6.25 TABLET ORAL 2 TIMES DAILY WITH MEALS
Status: DISCONTINUED | OUTPATIENT
Start: 2021-03-14 | End: 2021-03-14

## 2021-03-14 NOTE — PROGRESS NOTES
[facility] Cardiology Progress Note    Subjective:  He feels fine, yet bp high this am.      Objective:  BP (!) 179/115 (BP Location: Left arm)   Pulse 94   Temp 98 °F (36.7 °C) (Oral)   Resp 18   Ht 175.3 cm (5' 9\")   Wt 246 lb 0.5 oz (111.6 kg)   SpO2 95%

## 2021-03-14 NOTE — PLAN OF CARE
Pt and VS remained stable throughout shift. Denies dizziness CP/SOB. Tele:SR Neuro/NIH 0 no acute changes noted. Off floor much of day for testing. B/P 154/86. POC updated with spouse and pt and bedside verbalized understanding.

## 2021-03-14 NOTE — PROGRESS NOTES
[id number] [facility] Progress Note    Gustavo Araiza Patient Status:  Inpatient    1980 MRN [id number]   [facility] 2NE-A Attending Christiano Mcdonald MD   Hosp Day # 2 PCP Jenifer Morgan,          Subjective:  [address] lesions seen both supratentorially and infratentorially.  The overall picture has the appearance of differing ages of ischemic disease such as a subacute infarct within the right parietal lobe and more chronic cystic encephalomalacia changes within the rig 5. 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

## 2021-03-14 NOTE — DISCHARGE SUMMARY
[facility]OSPITAL[facility]  DISCHARGE SUMMARY     Keira Donato Patient Status:  Inpatient    1980 MRN [id number]   [facility] 2NE-A Attending No att. providers found   Hosp Day # 3 PCP Yuki Cerrato DO     Date of Admission: 3/12/2021 blood pressure. MRI of the brain was done which showed multiple white matter lesions possibly infarcts of differing ages. There is also a pattern of small focal disease in the cerebellum concerning for PRES. He was then transferred to [facility] SAMUEL Villareal amLODIPine Besylate 5 MG Tabs  Commonly known as: NORVASC  What changed:   · how much to take  · additional instructions      Take 2 tablets (10 mg total) by mouth daily. New med. Has not started yet.    Refills: 0     losartan Potassium 50 MG Tabs  Commo bilaterally. No wheezes. No rhonchi. Cardiovascular: S1, S2. Regular rate and rhythm. No murmurs, rubs or gallops. Abdomen: Soft, nontender, nondistended. Positive bowel sounds. No rebound or guarding. Neurologic: No focal neurological deficits.    Trevon Browning

## 2021-03-14 NOTE — PROGRESS NOTES
[facility]  Progress Note     Margo Lowers Patient Status:  Inpatient    1980 MRN [id number]   [facility] 2NE-A Attending Reggie Jesus MD   Hosp Day # 2 PCP James Kerns DO     Chief Complaint: HTN    S: Patient eager input(s): PTP, INR in the last 168 hours. Recent Labs   Lab 03/12/21  1212   TROP <0.045            Imaging: Imaging data reviewed in Epic.     Medications:   • [START ON 3/15/2021] amLODIPine Besylate  10 mg Oral Daily   • [START ON 3/15/2021] hydrochlo

## 2021-03-14 NOTE — PLAN OF CARE
Monitoring B/P frequently cards MD/APN updates. Med changes noted. POC updated with support person at bedside. No neuro changes at time of this note.

## 2021-03-14 NOTE — PLAN OF CARE
Rec in bed during rounds found to have B/P of 184/129. Denies CP/SOB/Dizziness. TELE: SR No s/s of acute distress noted. No IV access at change of shift. 9 am PO B/P meds given at that time. Cards APN rounding and in room at the time.  B/P med order changes

## 2021-03-14 NOTE — CONSULTS
[facility] Cardiology Consult Note    Verenice Smart Patient Status:  Inpatient    1980 MRN [id number]   [facility] 2NE-A Attending Jhon Jane MD   Hosp Day # 1 PCP Himanshu Adame DO     HPI:  72-year-old male with a past medical further with primary service and Dr. Nargis Chakraborty  - Will start on statin therapy  - Cont ASA as per neurology      Mildred Hawkins DO   S/[facility] Cardiology    -------------------------------------------------------------------------------------------------------------

## 2021-03-14 NOTE — OCCUPATIONAL THERAPY NOTE
OT order received, chart reviewed. Attempted to see patient for OT evaluation this AM, ran into PCT leaving patient's room reporting pt's systolic BP is in the 976'E at rest. Will re-attempt as patient medically appropriate and as schedule allows.

## 2021-03-14 NOTE — PROGRESS NOTES
Assumed care of pt at 1530. MD aware of vital signs. Pt updated about plan to monitor overnight- frustrated, but verbalized understanding. Neuro checks completed. Assessment complete. All needs met at this time. Will continue to monitor.

## 2021-03-14 NOTE — PLAN OF CARE
Was previously seen for dizziness, headache, photosensitivity on 3/11 with a b/p 240/169 given medications to lower b/p and declined admission. According to the Dr notes pt stopped taking b/p meds 5 years ago. Tele monitoring. I/o. MARTHA.  Hand grasps st INTERVENTIONS:  - Assess for and report changes in neurological status  Outcome: Progressing     Problem: Cardiovascular  Goal: Maintains optimal cardiac output and hemodynamic stability  Description: INTERVENTIONS:  - Monitor vital signs and trends  - Adm nutritional intake and initiate nutrition consult as needed  - Instruct patient on self management of diabetes  Outcome: Progressing

## 2021-03-15 VITALS
DIASTOLIC BLOOD PRESSURE: 105 MMHG | HEIGHT: 69 IN | HEART RATE: 70 BPM | OXYGEN SATURATION: 100 % | SYSTOLIC BLOOD PRESSURE: 152 MMHG | WEIGHT: 246.06 LBS | TEMPERATURE: 98 F | RESPIRATION RATE: 18 BRPM | BODY MASS INDEX: 36.44 KG/M2

## 2021-03-15 PROCEDURE — 99239 HOSP IP/OBS DSCHRG MGMT >30: CPT | Performed by: INTERNAL MEDICINE

## 2021-03-15 RX ORDER — HYDROCHLOROTHIAZIDE 50 MG/1
50 TABLET ORAL DAILY
Qty: 30 TABLET | Refills: 1 | Status: SHIPPED | OUTPATIENT
Start: 2021-03-16 | End: 2021-05-14

## 2021-03-15 RX ORDER — CARVEDILOL 12.5 MG/1
12.5 TABLET ORAL 2 TIMES DAILY WITH MEALS
Qty: 60 TABLET | Refills: 1 | Status: SHIPPED | OUTPATIENT
Start: 2021-03-15 | End: 2021-03-18

## 2021-03-15 NOTE — OCCUPATIONAL THERAPY NOTE
OCCUPATIONAL THERAPY QUICK EVALUATION - INPATIENT    Room Number: 9749/3469-K  Evaluation Date: 3/15/2021     Type of Evaluation: Quick Eval  Presenting Problem: Hypertensive emergency    Physician Order: IP Consult to Occupational Therapy  Reason for Ther within functional limits     Upper extremity strength is within functional limits     NEUROLOGICAL FINDINGS  Neurological Findings: Coordination - Finger to Nose;Coordination - Rapid Alternating Movement; Coordination - Finger Opposition  Coordination - Fin -PAC ADL assessment is 24 indicating that pt is a good home candidate based on 0 percentage of impairment.       Patient Complexity  Occupational Profile/Medical History  LOW - Brief history including review of medical or therapy records    Specific perfo

## 2021-03-15 NOTE — PLAN OF CARE
Assumed care of pt at 0730. A&Ox4. NSR on tele. Lung sounds clear bilaterally on room air. Pt denies pain and SOB. Ambulating in room independently without difficulty. Marshall Mao to go home. BP in the 160s-170s, pt asymptomatic. Q4 neuro checks.   Pt updated on hypoglycemia  - Administer ordered medications to maintain glucose within target range  - Assess barriers to adequate nutritional intake and initiate nutrition consult as needed  - Instruct patient on self management of diabetes  Outcome: Progressing  Goal

## 2021-03-15 NOTE — PLAN OF CARE
Alert and oriented x4. On RA. Denies any SOB or chest pain. Denies any dizziness or headaches. Neuro checks Q4H. NSR on tele. Continent to bowel and bladder. Denies pain. Up at [address]. Call light within reach.      Problem: Neurological  Goal: Achieves stable Diabetes/Glucose Control  Goal: Glucose maintained within prescribed range  Description: INTERVENTIONS:  - Monitor Blood Glucose as ordered  - Assess for signs and symptoms of hyperglycemia and hypoglycemia  - Administer ordered medications to maintain glu

## 2021-03-15 NOTE — DISCHARGE PLANNING
D/C orders received from Dr. Jada Gore and all other consults. IV removed, IV catheter intact. Follow up appointments discussed. New meds prescribed to pharmacy. Discharge paperwork given and discussed.    Patient taken to [facility] in wheelchair via

## 2021-03-17 ENCOUNTER — PATIENT OUTREACH (OUTPATIENT)
Dept: CASE MANAGEMENT | Age: 41
End: 2021-03-17

## 2021-03-17 DIAGNOSIS — Z02.9 ENCOUNTERS FOR ADMINISTRATIVE PURPOSE: ICD-10-CM

## 2021-03-17 NOTE — PAYOR COMM NOTE
--------------  CONTINUED STAY REVIEW    Payor: Johana Cuong #:  [id number]  Authorization Number: [id number]    Admit date: 3/12/21  Admit time:  7:32 PM    Admitting Physician: Mariya Love DO  Attending Physician:  No att. providers found 25.0    Anion Gap   0 - 18 mmol/L 8    BUN   7 - 18 mg/dL 14    Creatinine   0.70 - 1.30 mg/dL 1.06    BUN/CREA Ratio   10.0 - 20.0 13.2    Calcium, Total   8.5 - 10.1 mg/dL 8.9    Calculated Osmolality   275 - 295 mOsm/kg 291    GFR, Non-African American monitoring  Cardiology to see re; HTN  recommend -160  MRI brain reviewed with pt, subacute infarct vs PRES  CTA head and neck ordered   mg  Lipitor 40 mg  DVT prophylaxis  PT/OT        Cardiology Consult Note    Assessment and Plan:      5. Neurology rec appreciated   6. MRI as outpt   2. Hypertensive emergency- improved  1. Echo  noted  2. meds readjusted again today   3. US renal neg for SALMA  4. Pt education on BP management and importance of compliance  3.  Dizziness - likely due to ab

## 2021-03-17 NOTE — PROGRESS NOTES
LM for pt to call [facility] for TCM since discharge. [facility] phone number was provided for pt to call back.

## 2021-03-17 NOTE — PAYOR COMM NOTE
--------------  DISCHARGE REVIEW    Payor: Natalie Jose L #:  [id number]  Authorization Number: [id number]    Admit date: 3/12/21  Admit time:   7:32 PM  Discharge Date: 3/15/2021  1:00 PM     Admitting Physician: Del Serrano DO  Attending Ph open to all the educational material provided to him. Lace+ Score: 36  59-90 High Risk  29-58 Medium Risk  0-28   Low Risk  Patient was referred to the [facility]. TCM Follow-Up Recommendation:  LACE 29-58:  Moderate Risk of constantine [phone], [phone]  [address] [place] [address] [place] [address]    Phone: [phone]   · aspirin 325 MG Tabs  · atorvastatin 40 MG Tabs  · carvedilol 12.5 MG Tabs  · hydrochlorothiazide 50 MG Tabs  · Meclizine HCl 25 MG Tabs         [place]: [place] 3/15/2021  3:33 PM

## 2021-03-17 NOTE — PROGRESS NOTES
LM for pt to call [facility] for TCM since discharge. [facility] phone number was provided for pt to call back.

## 2021-03-17 NOTE — PAYOR COMM NOTE
--------------    Payor: Kyra Mcfarland #:  [id number]  Authorization Number: [id number]    Admit date: 3/12/21  Admit time:  7:32 PM    Admitting Physician: Antonieta Prince DO  Attending Physician:  No att. providers found  [id number] He was given labetalol which improved his blood pressure. MRI of the brain was done which showed multiple white matter lesions possibly infarcts of differing ages. There is also a pattern of small focal disease in the cerebellum concerning for PRES.   He to uncontrolled hypertension  1. Repeat BMP in the morning  2. Check renal dopplers  6. Chronically elevated LFTs, improved from previous. Possibly fatty liver  1. Outpatient workup as it is chronic issue  7.  Hypokalemia      PROCEDURE:  MRI BRAIN    FIND

## 2021-03-17 NOTE — PROGRESS NOTES
[facility] TRANSITIONAL CARE CLINIC      HISTORY   CHIEF COMPLAINT: HA/Dizziness/HTN emergency/HTN/Subacute infarct vs PRES  HPI: Braxton Cowan is a 46year old male here today for hospital follow up for HA/Dizziness/HTN emergency/HTN/ mg total) by mouth nightly., Disp: 30 tablet, Rfl: 1  Meclizine HCl 25 MG Oral Tab, Take 1 tablet (25 mg total) by mouth 3 (three) times daily as needed. , Disp: 20 tablet, Rfl: 0    No current facility-administered medications for this visit.       HISTORY: multiple white matter lesions seen both supratentorially and infratentorially.   The overall picture has the appearance of differing ages of ischemic disease such as a subacute infarct within the right parietal lobe and more chronic cystic encephalomalacia  03/13/2021 06:41 AM    K 3.9 03/13/2021 06:41 AM     03/13/2021 06:41 AM    CO2 25.0 03/13/2021 06:41 AM    BUN 14 03/13/2021 06:41 AM    CREATSERUM 1.06 03/13/2021 06:41 AM    CA 8.9 03/13/2021 06:41 AM    MG 2.5 03/13/2021 06:41 AM    ALB warm, dry, no rashes  HEENT: atraumatic, normocephalic, EOMI, sclera white, conjunctivae pink  NECK: supple, no adenopathy  LUNGS: clear to auscultation bilaterally anteriorly and posteriorly, no cough, no wheezes, normal respiratory effort, on room air  C Depression Screen due on 03/12/2022  Pneumococcal Vaccine: Birth to 56yrs Aged Out    Chronic Care Management Referral: N/A      Transitional Care Management Certification:  During the visit, the following was completed:  ?  Obtained and reviewed discharge [facility], [address] [place] [address] [place]  [place], [address] [id number]  [address]  [address] [place] [address]  Edith Fees [phone]

## 2021-03-18 ENCOUNTER — OFFICE VISIT (OUTPATIENT)
Dept: INTERNAL MEDICINE CLINIC | Facility: CLINIC | Age: 41
End: 2021-03-18
Payer: COMMERCIAL

## 2021-03-18 ENCOUNTER — OFFICE VISIT (OUTPATIENT)
Dept: NEUROLOGY | Facility: CLINIC | Age: 41
End: 2021-03-18
Payer: COMMERCIAL

## 2021-03-18 VITALS — HEART RATE: 80 BPM | DIASTOLIC BLOOD PRESSURE: 90 MMHG | RESPIRATION RATE: 16 BRPM | SYSTOLIC BLOOD PRESSURE: 138 MMHG

## 2021-03-18 VITALS
HEIGHT: 70 IN | HEART RATE: 85 BPM | BODY MASS INDEX: 35.22 KG/M2 | TEMPERATURE: 98 F | SYSTOLIC BLOOD PRESSURE: 142 MMHG | WEIGHT: 246 LBS | DIASTOLIC BLOOD PRESSURE: 96 MMHG | OXYGEN SATURATION: 99 % | RESPIRATION RATE: 16 BRPM

## 2021-03-18 DIAGNOSIS — R47.89 WORD FINDING DIFFICULTY: ICD-10-CM

## 2021-03-18 DIAGNOSIS — I67.83 PRES (POSTERIOR REVERSIBLE ENCEPHALOPATHY SYNDROME): Primary | ICD-10-CM

## 2021-03-18 DIAGNOSIS — N17.9 AKI (ACUTE KIDNEY INJURY) (HCC): ICD-10-CM

## 2021-03-18 DIAGNOSIS — I16.1 HYPERTENSIVE EMERGENCY: Primary | ICD-10-CM

## 2021-03-18 DIAGNOSIS — I67.83 PRES (POSTERIOR REVERSIBLE ENCEPHALOPATHY SYNDROME): ICD-10-CM

## 2021-03-18 DIAGNOSIS — E78.2 MIXED HYPERLIPIDEMIA: ICD-10-CM

## 2021-03-18 PROBLEM — E87.6 HYPOKALEMIA: Status: ACTIVE | Noted: 2021-03-18

## 2021-03-18 LAB
ALBUMIN SERPL-MCNC: 4.1 G/DL (ref 3.4–5)
ALBUMIN/GLOB SERPL: 1.1 {RATIO} (ref 1–2)
ALP LIVER SERPL-CCNC: 84 U/L
ALT SERPL-CCNC: 74 U/L
ANION GAP SERPL CALC-SCNC: 6 MMOL/L (ref 0–18)
AST SERPL-CCNC: 31 U/L (ref 15–37)
BILIRUB SERPL-MCNC: 0.6 MG/DL (ref 0.1–2)
BUN BLD-MCNC: 19 MG/DL (ref 7–18)
BUN/CREAT SERPL: 15.3 (ref 10–20)
CALCIUM BLD-MCNC: 9.6 MG/DL (ref 8.5–10.1)
CHLORIDE SERPL-SCNC: 108 MMOL/L (ref 98–112)
CO2 SERPL-SCNC: 25 MMOL/L (ref 21–32)
CREAT BLD-MCNC: 1.24 MG/DL
GLOBULIN PLAS-MCNC: 3.6 G/DL (ref 2.8–4.4)
GLUCOSE BLD-MCNC: 132 MG/DL (ref 70–99)
M PROTEIN MFR SERPL ELPH: 7.7 G/DL (ref 6.4–8.2)
OSMOLALITY SERPL CALC.SUM OF ELEC: 292 MOSM/KG (ref 275–295)
PATIENT FASTING Y/N/NP: NO
POTASSIUM SERPL-SCNC: 3.6 MMOL/L (ref 3.5–5.1)
SODIUM SERPL-SCNC: 139 MMOL/L (ref 136–145)

## 2021-03-18 PROCEDURE — 3080F DIAST BP >= 90 MM HG: CPT | Performed by: OTHER

## 2021-03-18 PROCEDURE — 80053 COMPREHEN METABOLIC PANEL: CPT | Performed by: NURSE PRACTITIONER

## 2021-03-18 PROCEDURE — 3077F SYST BP >= 140 MM HG: CPT | Performed by: NURSE PRACTITIONER

## 2021-03-18 PROCEDURE — 3075F SYST BP GE 130 - 139MM HG: CPT | Performed by: OTHER

## 2021-03-18 PROCEDURE — 99495 TRANSJ CARE MGMT MOD F2F 14D: CPT | Performed by: NURSE PRACTITIONER

## 2021-03-18 PROCEDURE — 3008F BODY MASS INDEX DOCD: CPT | Performed by: NURSE PRACTITIONER

## 2021-03-18 PROCEDURE — 99214 OFFICE O/P EST MOD 30 MIN: CPT | Performed by: OTHER

## 2021-03-18 PROCEDURE — 3080F DIAST BP >= 90 MM HG: CPT | Performed by: NURSE PRACTITIONER

## 2021-03-18 RX ORDER — CARVEDILOL 25 MG/1
25 TABLET ORAL 2 TIMES DAILY WITH MEALS
Qty: 60 TABLET | Refills: 0 | Status: SHIPPED | OUTPATIENT
Start: 2021-03-18 | End: 2021-04-15

## 2021-03-18 NOTE — PROGRESS NOTES
Several attempts made to reach the patient with no return call. Patient completed HFU on 3/18/2021. Closing encounter.

## 2021-03-18 NOTE — PROGRESS NOTES
[facility] Neurology outpatient progress note  Date of service: 3/18/2021    Patient here for a follow-up visit for recent admission. Since last seen in house, pt is doing well, no new deficit. Tolerating medications without side effects.   Olinda Joy is a(n History:   Diagnosis Date   • Dehydration    • Essential hypertension    • Essential hypertension, benign 10/25/2014   • History of ear infection 2012   • Low HDL (under 40) 11/28/2014   • Mixed hyperlipidemia 11/28/2014   • Sinus infection      Past Surgi new or worsening symptoms and contact office.     Mino Villarreal MD  Neurology  [facility]  3/18/2021, 10:43 AM  CC: James Kerns DO

## 2021-03-18 NOTE — PROGRESS NOTES
Per Rebecca Gibson, pt's lab results are back, kidney function is mildly elevated, pt should increase water intake, potassium is normal.    Spoke to pt, informed him of advice,pt verbalized understanding and intent to comply

## 2021-03-18 NOTE — PROGRESS NOTES
TRANSITIONAL CARE CLINIC PHARMACIST MEDICATION RECONCILIATION        Meredith Santo MRN [id number]    1980 PCP Ajay Healy DO       Comments: Medication history completed by the [facility] Pharmacist with the patient in the off times a day (morning and evening) about an hour after taking his medications. He does not sit for about 5 minutes before taking the blood pressure, because he states that is not where his blood pressure would be on a regular basis.   He does take his blood

## 2021-03-18 NOTE — PATIENT INSTRUCTIONS
Patient instructions:  · Continue to monitor blood pressure 2x daily at home and keep a log. Bring to cardiology appt.   · Increase physical activity and exercise to achieve weight loss  · Follow a low salt (sodium) and heart healthy diet  · Regarding dizz great ways to decrease the amount of salt you consume. At home, flavor your foods with other spices and herbs instead of salt. · Managing calories. A calorie is a unit of energy.  Your body burns calories for fuel, but if you eat more calories than your roxana starts at the grocery store. Be sure to pay attention to food labels on packaged foods. Look for products that are high in fiber and protein, and low in saturated fat, added sugars, and sodium. Avoid products that contain trans fat.  And pay close attention kidneys are another reason to follow this diet. What can I do to make a 2 gram sodium part of my lifestyle? Changing what you eat and drink may be hard at first. Think of these changes as \"lifestyle\" changes, not just \"diet\" changes.  You may need t the food usually has 500 mg of sodium in each serving, the same food prepared \"light in sodium\" would have 250 mg of sodium. Unsalted, No added salt, and Without added salt: No salt is added during processing.       Lightly salted: Fifty percent less Instant pudding mixes and cake mixes. Seasonings, sauces, mixes, and other foods:  Seasoning made with salt including garlic salt, celery salt, onion salt, and seasoned salt.       Sea salt, rock salt, kosher salt, meat tenderizers and monosodium glut of milk are also good choices. Fats such as butter or margarine, vegetable oils, unsalted salad dressings, or one tablespoon or less of regular salad dressings may be included in this diet. Light, sour, and heavy cream may also be included.  Desserts a blood may cause nausea, confusion, and make you less alert. Call your caregiver right away if you have any of these symptoms. CARE AGREEMENT:    You have the right to help plan your care. To help with this plan, you must learn about your diet.  You can t

## 2021-03-24 ENCOUNTER — OFFICE VISIT (OUTPATIENT)
Dept: FAMILY MEDICINE CLINIC | Facility: CLINIC | Age: 41
End: 2021-03-24
Payer: COMMERCIAL

## 2021-03-24 VITALS
OXYGEN SATURATION: 98 % | SYSTOLIC BLOOD PRESSURE: 124 MMHG | WEIGHT: 246 LBS | TEMPERATURE: 98 F | HEIGHT: 70 IN | BODY MASS INDEX: 35.22 KG/M2 | DIASTOLIC BLOOD PRESSURE: 82 MMHG | HEART RATE: 77 BPM

## 2021-03-24 DIAGNOSIS — E66.01 CLASS 2 SEVERE OBESITY DUE TO EXCESS CALORIES WITH SERIOUS COMORBIDITY AND BODY MASS INDEX (BMI) OF 35.0 TO 35.9 IN ADULT (HCC): ICD-10-CM

## 2021-03-24 DIAGNOSIS — I67.83 PRES (POSTERIOR REVERSIBLE ENCEPHALOPATHY SYNDROME): ICD-10-CM

## 2021-03-24 DIAGNOSIS — R74.8 ELEVATED LIVER ENZYMES: ICD-10-CM

## 2021-03-24 DIAGNOSIS — J30.9 ALLERGIC RHINITIS, UNSPECIFIED SEASONALITY, UNSPECIFIED TRIGGER: ICD-10-CM

## 2021-03-24 DIAGNOSIS — I16.1 HYPERTENSIVE EMERGENCY: ICD-10-CM

## 2021-03-24 DIAGNOSIS — E78.5 DYSLIPIDEMIA: ICD-10-CM

## 2021-03-24 DIAGNOSIS — I10 ESSENTIAL HYPERTENSION, BENIGN: Primary | ICD-10-CM

## 2021-03-24 PROBLEM — E87.6 HYPOKALEMIA: Status: RESOLVED | Noted: 2021-03-18 | Resolved: 2021-03-24

## 2021-03-24 PROBLEM — N28.9 RENAL INSUFFICIENCY: Status: RESOLVED | Noted: 2021-03-12 | Resolved: 2021-03-24

## 2021-03-24 PROCEDURE — 3079F DIAST BP 80-89 MM HG: CPT | Performed by: FAMILY MEDICINE

## 2021-03-24 PROCEDURE — 3008F BODY MASS INDEX DOCD: CPT | Performed by: FAMILY MEDICINE

## 2021-03-24 PROCEDURE — 99214 OFFICE O/P EST MOD 30 MIN: CPT | Performed by: FAMILY MEDICINE

## 2021-03-24 PROCEDURE — 3074F SYST BP LT 130 MM HG: CPT | Performed by: FAMILY MEDICINE

## 2021-03-24 NOTE — PATIENT INSTRUCTIONS
-Schedule your appointment with the gastroenterologist after you have scheduled the liver ultrasound.             Recommend lean meats such as skinless chicken breast, 90% lean ground beef, lean ground turkey, pork loin, and any type of fi

## 2021-03-24 NOTE — PROGRESS NOTES
Gustavo Araiza is a 46year old male. HPI:     New patient, patient lost to follow-up, patient last seen 1/21/2015.     Patient recently presented to [facility] emergency department and was subsequently admitted:    Date of Admission: 3/12/2021 Take 2 tablets (100 mg total) by mouth every evening. New med . Has not started yet. • aspirin 325 MG Oral Tab Take 1 tablet (325 mg total) by mouth daily. 30 tablet 1   • atorvastatin 40 MG Oral Tab Take 1 tablet (40 mg total) by mouth nightly.  30 tab vision  PSYCH: denies depression and anxiety      Immunization History  Administered            Date(s) Administered    >=3 YRS TRI  MULTIDOSE VIAL (58902) FLU CLINIC                          10/25/2014      TDAP                  10/25/2014      EXAM:   BP A/G Ratio      1.0 - 2.0 1.1 1.2 1.2   Patient Fasting?        No         Component      Latest Ref Rng & Units 3/13/2021 3/6/2017 12/26/2016 12/19/2014   Cholesterol, Total      <200 mg/dL 264 (H) 277 (H) 282 (H) 217 (H)   Triglycerides      30 - 149 mg/ (INTERNAL)    4. Dyslipidemia  Continue atorvastatin 40 mg nightly. Recheck labs in 3 months. Persistently elevated lipids and obesity likely contributing to elevated liver enzymes, ultrasound of liver with elastography ordered and pending.   Patient refe

## 2021-03-29 ENCOUNTER — LAB ENCOUNTER (OUTPATIENT)
Dept: LAB | Age: 41
End: 2021-03-29
Attending: FAMILY MEDICINE
Payer: COMMERCIAL

## 2021-03-29 DIAGNOSIS — J30.9 ALLERGIC RHINITIS, UNSPECIFIED SEASONALITY, UNSPECIFIED TRIGGER: ICD-10-CM

## 2021-03-29 DIAGNOSIS — E78.5 DYSLIPIDEMIA: ICD-10-CM

## 2021-03-29 DIAGNOSIS — I10 ESSENTIAL HYPERTENSION, BENIGN: ICD-10-CM

## 2021-04-08 ENCOUNTER — OFFICE VISIT (OUTPATIENT)
Dept: NUTRITION | Facility: HOSPITAL | Age: 41
End: 2021-04-08
Attending: FAMILY MEDICINE
Payer: COMMERCIAL

## 2021-04-08 PROCEDURE — 97802 MEDICAL NUTRITION INDIV IN: CPT

## 2021-04-09 NOTE — PROGRESS NOTES
ADULT INITIAL OUTPATIENT NUTRITION CONSULTATION    Nutrition Assessment    Medical Diagnosis: hyperlipidemia, obesity, HTN    PMH: elevated liver enzymes    Client Hx: 46year old male    Meds: noted    Labs (3/13/21): CHOL: 264, HDL: 35, LDL: 204, T high in omega 3's. Encouraged lean meats, low fat dairy (does have a slight lactose intolerance), whole grains, and more f&v. Provided recipes and written materials, all questions answered today. Encouraged increasing physical activity.  Pt has set goals to

## 2021-04-12 ENCOUNTER — HOSPITAL ENCOUNTER (OUTPATIENT)
Dept: MRI IMAGING | Facility: HOSPITAL | Age: 41
Discharge: HOME | End: 2021-04-12
Attending: Other
Payer: COMMERCIAL

## 2021-04-12 DIAGNOSIS — I67.83 PRES (POSTERIOR REVERSIBLE ENCEPHALOPATHY SYNDROME): ICD-10-CM

## 2021-04-12 PROCEDURE — 70553 MRI BRAIN STEM W/O & W/DYE: CPT | Performed by: OTHER

## 2021-04-12 PROCEDURE — A9575 INJ GADOTERATE MEGLUMI 0.1ML: HCPCS | Performed by: OTHER

## 2021-04-15 DIAGNOSIS — I67.83 PRES (POSTERIOR REVERSIBLE ENCEPHALOPATHY SYNDROME): ICD-10-CM

## 2021-04-15 DIAGNOSIS — I16.1 HYPERTENSIVE EMERGENCY: ICD-10-CM

## 2021-04-15 RX ORDER — CARVEDILOL 25 MG/1
25 TABLET ORAL 2 TIMES DAILY WITH MEALS
Qty: 180 TABLET | Refills: 0 | Status: SHIPPED | OUTPATIENT
Start: 2021-04-15 | End: 2021-07-26

## 2021-04-15 RX ORDER — LOSARTAN POTASSIUM 50 MG/1
50 TABLET ORAL EVERY EVENING
Qty: 90 TABLET | Refills: 0 | Status: SHIPPED | OUTPATIENT
Start: 2021-04-15 | End: 2021-07-13

## 2021-04-15 RX ORDER — AMLODIPINE BESYLATE 5 MG/1
5 TABLET ORAL DAILY
Qty: 90 TABLET | Refills: 0 | Status: SHIPPED | OUTPATIENT
Start: 2021-04-15 | End: 2021-07-13

## 2021-04-15 NOTE — TELEPHONE ENCOUNTER
Chart reviewed. Patient was to have had an appointment with cardiologist, Dr. Freddie Ray, on 4/1/2021, I do not see a visit note from Dr. Freddie Ray. Please instruct patient to make an appointment to see the cardiologist, Dr. Freddie Ray.   The following medication orders were

## 2021-04-15 NOTE — TELEPHONE ENCOUNTER
Called patient to verify meds. He is unsure of doses. Asked if I could call him back today at 2. Will call back.

## 2021-04-15 NOTE — TELEPHONE ENCOUNTER
Сергей De Anda is calling to get his carvedilol 25 MG Oral amLODIPine Besylate 5 MG Oral Tab, losartan Potassium 50 MG Oral Tab sent to his [address] on [employer], his carvedilol needs to be 25 mg they changed it at the hospital before he left he was t

## 2021-04-16 ENCOUNTER — TELEPHONE (OUTPATIENT)
Dept: CARDIOLOGY | Age: 41
End: 2021-04-16

## 2021-04-16 NOTE — TELEPHONE ENCOUNTER
Patient states he did see Dr. Bright Denney on 4/9/48 and is certain he has a follow up end of May but could not remember exact date. He is going to call [facility] to verify next appointment.

## 2021-05-03 ENCOUNTER — OFFICE VISIT (OUTPATIENT)
Dept: NEUROLOGY | Facility: CLINIC | Age: 41
End: 2021-05-03
Payer: COMMERCIAL

## 2021-05-03 VITALS — SYSTOLIC BLOOD PRESSURE: 126 MMHG | HEART RATE: 76 BPM | RESPIRATION RATE: 16 BRPM | DIASTOLIC BLOOD PRESSURE: 82 MMHG

## 2021-05-03 DIAGNOSIS — G35 MS (MULTIPLE SCLEROSIS) (HCC): Primary | ICD-10-CM

## 2021-05-03 PROCEDURE — 99215 OFFICE O/P EST HI 40 MIN: CPT | Performed by: OTHER

## 2021-05-03 PROCEDURE — 3079F DIAST BP 80-89 MM HG: CPT | Performed by: OTHER

## 2021-05-03 PROCEDURE — 3074F SYST BP LT 130 MM HG: CPT | Performed by: OTHER

## 2021-05-03 NOTE — PROGRESS NOTES
[facility] Neurology Outpatient Progress Note  Date of service: 5/3/2021    Patient here for a follow-up visit for abnormal MRI brain and recent admission.  Since last seen, pt is doing well, left hand difficulty in typing, but overall feels improved, no new defic hypertension    • Essential hypertension, benign 10/25/2014   • History of ear infection 2012   • Low HDL (under 40) 11/28/2014   • Mixed hyperlipidemia 11/28/2014   • Sinus infection      Past Surgical History:   Procedure Laterality Date   • VASECTOMY  1 LUMBAR PUNCTURE  (CPT=62270,06725)    See orders and medications filed with this encounter. The patient indicates understanding of these issues and agrees with the plan.   RTC after LP/csf resulted  Pt should go ER for any new or worsening symptoms and cont

## 2021-05-03 NOTE — PROGRESS NOTES
Patient here to follow up regarding recent repeat imaging. States overall he is better since last visit.  Still notes word finding difficulty and trouble typing, but better overall

## 2021-05-05 ENCOUNTER — OFFICE VISIT (OUTPATIENT)
Dept: CARDIOLOGY | Age: 41
End: 2021-05-05

## 2021-05-05 VITALS
BODY MASS INDEX: 35.36 KG/M2 | DIASTOLIC BLOOD PRESSURE: 80 MMHG | WEIGHT: 247 LBS | SYSTOLIC BLOOD PRESSURE: 128 MMHG | HEIGHT: 70 IN | HEART RATE: 75 BPM

## 2021-05-05 DIAGNOSIS — E78.2 HYPERLIPIDEMIA, MIXED: ICD-10-CM

## 2021-05-05 DIAGNOSIS — I10 ESSENTIAL HYPERTENSION: Primary | ICD-10-CM

## 2021-05-05 PROBLEM — I67.83 PRES (POSTERIOR REVERSIBLE ENCEPHALOPATHY SYNDROME): Status: ACTIVE | Noted: 2021-05-05

## 2021-05-05 PROCEDURE — 3079F DIAST BP 80-89 MM HG: CPT | Performed by: NURSE PRACTITIONER

## 2021-05-05 PROCEDURE — 99244 OFF/OP CNSLTJ NEW/EST MOD 40: CPT | Performed by: NURSE PRACTITIONER

## 2021-05-05 PROCEDURE — 3074F SYST BP LT 130 MM HG: CPT | Performed by: NURSE PRACTITIONER

## 2021-05-05 ASSESSMENT — PATIENT HEALTH QUESTIONNAIRE - PHQ9
SUM OF ALL RESPONSES TO PHQ9 QUESTIONS 1 AND 2: 0
CLINICAL INTERPRETATION OF PHQ9 SCORE: NO FURTHER SCREENING NEEDED
SUM OF ALL RESPONSES TO PHQ9 QUESTIONS 1 AND 2: 0
CLINICAL INTERPRETATION OF PHQ2 SCORE: NO FURTHER SCREENING NEEDED
2. FEELING DOWN, DEPRESSED OR HOPELESS: NOT AT ALL
1. LITTLE INTEREST OR PLEASURE IN DOING THINGS: NOT AT ALL

## 2021-05-07 VITALS — WEIGHT: 246 LBS | HEIGHT: 70 IN | BODY MASS INDEX: 35.22 KG/M2

## 2021-05-10 ENCOUNTER — TELEPHONE (OUTPATIENT)
Dept: FAMILY MEDICINE CLINIC | Facility: CLINIC | Age: 41
End: 2021-05-10

## 2021-05-10 NOTE — TELEPHONE ENCOUNTER
Letter received from [facility] preadmission testing department regarding patient considered high risk for obstructive sleep apnea. Patient has lumbar puncture procedure scheduled for 5/21/2021.     Patient's chart reviewed, patient's cardiologist's office ord

## 2021-05-12 ENCOUNTER — OFFICE VISIT (OUTPATIENT)
Dept: FAMILY MEDICINE CLINIC | Facility: CLINIC | Age: 41
End: 2021-05-12
Payer: COMMERCIAL

## 2021-05-12 ENCOUNTER — TELEPHONE (OUTPATIENT)
Dept: CARDIOLOGY | Age: 41
End: 2021-05-12

## 2021-05-12 ENCOUNTER — HOSPITAL ENCOUNTER (OUTPATIENT)
Dept: ULTRASOUND IMAGING | Facility: HOSPITAL | Age: 41
Discharge: HOME | End: 2021-05-12
Attending: FAMILY MEDICINE
Payer: COMMERCIAL

## 2021-05-12 ENCOUNTER — LAB ENCOUNTER (OUTPATIENT)
Dept: LAB | Age: 41
End: 2021-05-12
Attending: Other
Payer: COMMERCIAL

## 2021-05-12 VITALS
TEMPERATURE: 98 F | HEART RATE: 80 BPM | HEIGHT: 70 IN | BODY MASS INDEX: 35.65 KG/M2 | OXYGEN SATURATION: 97 % | WEIGHT: 249 LBS | DIASTOLIC BLOOD PRESSURE: 80 MMHG | SYSTOLIC BLOOD PRESSURE: 124 MMHG

## 2021-05-12 DIAGNOSIS — I10 ESSENTIAL HYPERTENSION, MALIGNANT: Primary | ICD-10-CM

## 2021-05-12 DIAGNOSIS — I10 ESSENTIAL HYPERTENSION, BENIGN: ICD-10-CM

## 2021-05-12 DIAGNOSIS — E78.5 DYSLIPIDEMIA: ICD-10-CM

## 2021-05-12 DIAGNOSIS — K76.0 NAFLD (NONALCOHOLIC FATTY LIVER DISEASE): ICD-10-CM

## 2021-05-12 DIAGNOSIS — E78.2 MIXED HYPERLIPIDEMIA: ICD-10-CM

## 2021-05-12 DIAGNOSIS — Z00.00 ROUTINE GENERAL MEDICAL EXAMINATION AT A HEALTH CARE FACILITY: Primary | ICD-10-CM

## 2021-05-12 DIAGNOSIS — R74.8 ELEVATED LIVER ENZYMES: ICD-10-CM

## 2021-05-12 PROCEDURE — 84439 ASSAY OF FREE THYROXINE: CPT

## 2021-05-12 PROCEDURE — 99395 PREV VISIT EST AGE 18-39: CPT | Performed by: FAMILY MEDICINE

## 2021-05-12 PROCEDURE — 3079F DIAST BP 80-89 MM HG: CPT | Performed by: FAMILY MEDICINE

## 2021-05-12 PROCEDURE — 76981 USE PARENCHYMA: CPT | Performed by: FAMILY MEDICINE

## 2021-05-12 PROCEDURE — 36415 COLL VENOUS BLD VENIPUNCTURE: CPT

## 2021-05-12 PROCEDURE — 3008F BODY MASS INDEX DOCD: CPT | Performed by: FAMILY MEDICINE

## 2021-05-12 PROCEDURE — 84443 ASSAY THYROID STIM HORMONE: CPT

## 2021-05-12 PROCEDURE — 76705 ECHO EXAM OF ABDOMEN: CPT | Performed by: FAMILY MEDICINE

## 2021-05-12 PROCEDURE — 80061 LIPID PANEL: CPT

## 2021-05-12 PROCEDURE — 3074F SYST BP LT 130 MM HG: CPT | Performed by: FAMILY MEDICINE

## 2021-05-12 NOTE — PROGRESS NOTES
Mena Dwyer is a 46year old male   HPI:     Fatty liver disease:  Patient has been having difficulty finding a GI that is in network with his insurance. Under increased stress due to moving. Otherwise no new complaints.       Wt Readings from Last (25 mg total) by mouth 3 (three) times daily as needed.  (Patient not taking: Reported on 5/12/2021 ) 20 tablet 0      Past Medical History:   Diagnosis Date   • Dehydration    • Essential hypertension    • Essential hypertension, benign 10/25/2014   • Hist urethra  MUSCULOSKELETAL: denies joint or muscle aches or pains  NEURO: denies headaches/dizziness  PSYCH: denies depression or anxiety      EXAM:   /80 (BP Location: Left arm, Patient Position: Sitting, Cuff Size: adult)   Pulse 80   Temp 97.9 °F (3 propagation velocity:  1.20 meters/second     Inter-Quartile Range:  8.9%, 0.11 m/sec     Liver Fibrosis Staging:  F1                   Liver Fibrosis Staging  Liver Fibrosis StagingMetavir ScorekPam/sNormal - MildF15.48 kPa - 8.29 kPa1.35 m/s - 1.66 m/sMi evaluation and care. - GASTRO - INTERNAL          Imaging & Consults:  GASTRO - INTERNAL    Return in about 1 year (around 5/12/2022) for Wellness visit. Sooner if needed or indicated. Ronald Mcwilliams

## 2021-05-12 NOTE — PATIENT INSTRUCTIONS
-Make an appointment with the gastroenterologist when you can. Prevention Guidelines, Men Ages 25 to 44  Screening tests and vaccines are an important part of managing your health.  A screening test is done to find p it How often   Chickenpox (varicella) All men in this age group who have no record of this infection or vaccine 2 doses; the second dose should be given at least 4 weeks after the first dose   Hepatitis A Men at increased risk for infection – talk with you are sexually active At routine exams   Skin cancer All men in this age group. At routine exams. You may be reminded to avoid intentional tanning and tanning beds.     1Those who are 25years of age, who are not up-to-date on their childhood immunizations,

## 2021-05-13 ENCOUNTER — TELEPHONE (OUTPATIENT)
Dept: FAMILY MEDICINE CLINIC | Facility: CLINIC | Age: 41
End: 2021-05-13

## 2021-05-13 DIAGNOSIS — I10 ESSENTIAL HYPERTENSION, BENIGN: Primary | ICD-10-CM

## 2021-05-13 DIAGNOSIS — R74.8 ELEVATED LIVER ENZYMES: ICD-10-CM

## 2021-05-13 DIAGNOSIS — K76.0 NAFLD (NONALCOHOLIC FATTY LIVER DISEASE): ICD-10-CM

## 2021-05-13 DIAGNOSIS — E78.2 MIXED HYPERLIPIDEMIA: ICD-10-CM

## 2021-05-13 NOTE — TELEPHONE ENCOUNTER
Please call patient and inform him he does not have to get drawn again, I reordered the labs today and was able to have them added to the existing specimen that was drawn for the lipid panel.

## 2021-05-13 NOTE — TELEPHONE ENCOUNTER
Pt called and said Dr. Diego Hussein wanted him to call the office when he seen his test results pop up in his [address] He seen them today.

## 2021-05-13 NOTE — TELEPHONE ENCOUNTER
Patient states he only had LIPID panel drawn, states lab told him plan would not cover other labs until after 6/24/21. He will have them drawn then.    Requesting lab recommendations sent to him via [employer]

## 2021-05-14 ENCOUNTER — ORDER TRANSCRIPTION (OUTPATIENT)
Dept: SLEEP CENTER | Age: 41
End: 2021-05-14

## 2021-05-14 DIAGNOSIS — E78.2 MIXED HYPERLIPIDEMIA: ICD-10-CM

## 2021-05-14 DIAGNOSIS — I10 ESSENTIAL HYPERTENSION, MALIGNANT: Primary | ICD-10-CM

## 2021-05-14 RX ORDER — HYDROCHLOROTHIAZIDE 50 MG/1
50 TABLET ORAL DAILY
Qty: 90 TABLET | Refills: 0 | Status: SHIPPED | OUTPATIENT
Start: 2021-05-14 | End: 2021-08-17

## 2021-05-14 RX ORDER — ASPIRIN 325 MG
325 TABLET ORAL DAILY
Qty: 30 TABLET | Refills: 1 | Status: SHIPPED | OUTPATIENT
Start: 2021-05-14 | End: 2021-07-20

## 2021-05-14 RX ORDER — ATORVASTATIN CALCIUM 40 MG/1
40 TABLET, FILM COATED ORAL NIGHTLY
Qty: 90 TABLET | Refills: 0 | Status: SHIPPED | OUTPATIENT
Start: 2021-05-14 | End: 2021-07-30

## 2021-05-14 NOTE — TELEPHONE ENCOUNTER
Neisha Torrez is calling to let Dr Samual Pallas know that he needs his Asprin, Atorvistatin, and Hydroclorithizide refilled, the pharmacy did send over request, but Neisha Torrez is almost out of all 3 medications, Please call Neisha Torrez at  with any questions

## 2021-05-14 NOTE — TELEPHONE ENCOUNTER
Requested Prescriptions     Pending Prescriptions Disp Refills   • aspirin 325 MG Oral Tab 30 tablet 1     Sig: Take 1 tablet (325 mg total) by mouth daily.      Signed Prescriptions Disp Refills   • hydrochlorothiazide 50 MG Oral Tab 90 tablet 0     Sig: T

## 2021-05-15 PROBLEM — K76.0 NAFLD (NONALCOHOLIC FATTY LIVER DISEASE): Status: ACTIVE | Noted: 2021-05-15

## 2021-05-18 ENCOUNTER — LAB ENCOUNTER (OUTPATIENT)
Dept: LAB | Age: 41
End: 2021-05-18
Attending: Other
Payer: COMMERCIAL

## 2021-05-18 DIAGNOSIS — G35 MS (MULTIPLE SCLEROSIS) (HCC): ICD-10-CM

## 2021-05-20 RX ORDER — SODIUM CHLORIDE 9 MG/ML
INJECTION, SOLUTION INTRAVENOUS CONTINUOUS
Status: CANCELLED | OUTPATIENT
Start: 2021-05-20

## 2021-05-21 ENCOUNTER — LAB ENCOUNTER (OUTPATIENT)
Dept: LAB | Facility: HOSPITAL | Age: 41
End: 2021-05-21
Attending: Other
Payer: COMMERCIAL

## 2021-05-21 ENCOUNTER — HOSPITAL ENCOUNTER (OUTPATIENT)
Dept: GENERAL RADIOLOGY | Facility: HOSPITAL | Age: 41
Discharge: HOME | End: 2021-05-21
Attending: Other
Payer: COMMERCIAL

## 2021-05-21 VITALS — WEIGHT: 249 LBS | HEIGHT: 70 IN | BODY MASS INDEX: 35.65 KG/M2

## 2021-05-21 DIAGNOSIS — G35 MS (MULTIPLE SCLEROSIS) (HCC): ICD-10-CM

## 2021-05-21 DIAGNOSIS — G35 MULTIPLE SCLEROSIS (HCC): Primary | ICD-10-CM

## 2021-05-21 DIAGNOSIS — G35 MULTIPLE SCLEROSIS (HCC): ICD-10-CM

## 2021-05-21 PROCEDURE — 85610 PROTHROMBIN TIME: CPT

## 2021-05-21 PROCEDURE — 80053 COMPREHEN METABOLIC PANEL: CPT | Performed by: FAMILY MEDICINE

## 2021-05-24 ENCOUNTER — ORDER TRANSCRIPTION (OUTPATIENT)
Dept: ADMINISTRATIVE | Facility: HOSPITAL | Age: 41
End: 2021-05-24

## 2021-05-24 DIAGNOSIS — Z11.59 SCREENING EXAMINATION FOR ARTHROPOD-BORNE VIRAL DISEASE: ICD-10-CM

## 2021-05-24 DIAGNOSIS — Z01.818 PRE-OP TESTING: Primary | ICD-10-CM

## 2021-05-24 DIAGNOSIS — G35 MS (MULTIPLE SCLEROSIS) (HCC): Primary | ICD-10-CM

## 2021-05-24 NOTE — IMAGING NOTE
I contacted pt this morning to remind about need to hold ASA for 5 days p/t procedure. Pt states his last dose of ASA and vitamins was Sunday, 5/23 and procedure is scheduled for 5/28.

## 2021-05-25 ENCOUNTER — LAB ENCOUNTER (OUTPATIENT)
Dept: LAB | Age: 41
End: 2021-05-25
Attending: Other
Payer: COMMERCIAL

## 2021-05-25 DIAGNOSIS — Z01.818 PRE-OP TESTING: ICD-10-CM

## 2021-05-25 DIAGNOSIS — Z11.59 SCREENING EXAMINATION FOR ARTHROPOD-BORNE VIRAL DISEASE: ICD-10-CM

## 2021-05-28 ENCOUNTER — HOSPITAL ENCOUNTER (OUTPATIENT)
Dept: GENERAL RADIOLOGY | Facility: HOSPITAL | Age: 41
Discharge: HOME | End: 2021-05-28
Attending: Other
Payer: COMMERCIAL

## 2021-05-28 ENCOUNTER — NURSE ONLY (OUTPATIENT)
Dept: LAB | Facility: HOSPITAL | Age: 41
End: 2021-05-28
Attending: Other
Payer: COMMERCIAL

## 2021-05-28 VITALS
OXYGEN SATURATION: 99 % | HEART RATE: 62 BPM | RESPIRATION RATE: 16 BRPM | TEMPERATURE: 98 F | SYSTOLIC BLOOD PRESSURE: 156 MMHG | DIASTOLIC BLOOD PRESSURE: 96 MMHG

## 2021-05-28 DIAGNOSIS — G35 MULTIPLE SCLEROSIS (HCC): Primary | ICD-10-CM

## 2021-05-28 DIAGNOSIS — G35 MULTIPLE SCLEROSIS (HCC): ICD-10-CM

## 2021-05-28 DIAGNOSIS — G35 MS (MULTIPLE SCLEROSIS) (HCC): ICD-10-CM

## 2021-05-28 PROCEDURE — 85610 PROTHROMBIN TIME: CPT

## 2021-05-28 PROCEDURE — 88108 CYTOPATH CONCENTRATE TECH: CPT

## 2021-05-28 PROCEDURE — 87205 SMEAR GRAM STAIN: CPT

## 2021-05-28 PROCEDURE — 84157 ASSAY OF PROTEIN OTHER: CPT

## 2021-05-28 PROCEDURE — 87070 CULTURE OTHR SPECIMN AEROBIC: CPT

## 2021-05-28 PROCEDURE — 36415 COLL VENOUS BLD VENIPUNCTURE: CPT | Performed by: RADIOLOGY

## 2021-05-28 PROCEDURE — 62328 DX LMBR SPI PNXR W/FLUOR/CT: CPT | Performed by: OTHER

## 2021-05-28 PROCEDURE — 85025 COMPLETE CBC W/AUTO DIFF WBC: CPT | Performed by: RADIOLOGY

## 2021-05-28 PROCEDURE — 82784 ASSAY IGA/IGD/IGG/IGM EACH: CPT

## 2021-05-28 PROCEDURE — 82042 OTHER SOURCE ALBUMIN QUAN EA: CPT

## 2021-05-28 PROCEDURE — 89050 BODY FLUID CELL COUNT: CPT

## 2021-05-28 PROCEDURE — 82945 GLUCOSE OTHER FLUID: CPT

## 2021-05-28 RX ORDER — SODIUM CHLORIDE 9 MG/ML
INJECTION, SOLUTION INTRAVENOUS CONTINUOUS
Status: DISCONTINUED | OUTPATIENT
Start: 2021-05-28 | End: 2021-05-30

## 2021-05-28 NOTE — IMAGING NOTE
NPO since 05/28/21 1930, no ASA since 05/23/21. Report called to [facility], Rn, pt going for a lumbar puncture and will be going to [facility] post procedure.  Jf with patient her phone # [phone]

## 2021-06-01 ENCOUNTER — PATIENT MESSAGE (OUTPATIENT)
Dept: FAMILY MEDICINE CLINIC | Facility: CLINIC | Age: 41
End: 2021-06-01

## 2021-06-02 ENCOUNTER — TELEPHONE (OUTPATIENT)
Dept: CARDIOLOGY | Age: 41
End: 2021-06-02

## 2021-06-02 NOTE — TELEPHONE ENCOUNTER
No significant abnormality presuming that the elevated blood sugar is because you were not fasting. If you were fasting, please let me know.   Thank you,  Dr. Bette Scott   Written by Nesha Lloyd DO on 6/1/2021  1:53 PM CDT

## 2021-06-02 NOTE — TELEPHONE ENCOUNTER
From: Marco Bocanegra  To: George Kennedy DO  Sent: 6/1/2021 8:09 PM CDT  Subject: Other    I was not fasting

## 2021-06-03 ENCOUNTER — OFFICE VISIT (OUTPATIENT)
Dept: SLEEP CENTER | Age: 41
End: 2021-06-03
Attending: Other
Payer: COMMERCIAL

## 2021-06-03 ENCOUNTER — TELEPHONE (OUTPATIENT)
Dept: NEUROLOGY | Facility: CLINIC | Age: 41
End: 2021-06-03

## 2021-06-03 DIAGNOSIS — E78.2 MIXED HYPERLIPIDEMIA: ICD-10-CM

## 2021-06-03 DIAGNOSIS — I10 ESSENTIAL HYPERTENSION, MALIGNANT: ICD-10-CM

## 2021-06-03 PROCEDURE — 95806 SLEEP STUDY UNATT&RESP EFFT: CPT

## 2021-06-03 NOTE — TELEPHONE ENCOUNTER
Please contact Lab to see why csf oligo band was collected but not yet resulted. I am waiting for csf oligo band result.

## 2021-06-03 NOTE — TELEPHONE ENCOUNTER
In this case I request lab supervisor/relevant personnel to call pt in person asap and explained above. 1120 I called pt, relayed available labs results to pt. Lab made mistake and did not send csf oligo band I ordered. Pt is very unhappy with it.  I w

## 2021-06-03 NOTE — TELEPHONE ENCOUNTER
Spoke with Bhupinder Flores at [address] [place] [address] who states the phlebotomist mistakenly released the Oligoclonal Band order when processing the CMP and Prothrombin orders on 5/21, so when the CFS tests were done on 5/28, the Oligoclonal Bands was not in with the 5/28 lab order

## 2021-06-08 NOTE — PROCEDURES
[address] [address],[facility] [address]       Accredited by the [facility] of Sleep Medicine (AASM)    PATIENT'S NAME:        Delfino Benavides  ATTENDING PHYSICIAN:   Amy Rizzo DO  REFERRING PHYSICIAN:     PATIENT ACCOUNT #: recorded. There was a total of 4 hypopneas and 48 apneas for an overall apnea-hypopnea index of 27.4. The lowest oxyhemoglobin desaturation was seen at a kaila of 82%. Time spent with oxyhemoglobin desaturations less than 88% was 1 minute.   Average puls

## 2021-07-13 RX ORDER — AMLODIPINE BESYLATE 5 MG/1
TABLET ORAL
Qty: 90 TABLET | Refills: 0 | Status: SHIPPED | OUTPATIENT
Start: 2021-07-13 | End: 2021-10-01 | Stop reason: DRUGHIGH

## 2021-07-13 RX ORDER — LOSARTAN POTASSIUM 50 MG/1
TABLET ORAL
Qty: 90 TABLET | Refills: 0 | Status: SHIPPED | OUTPATIENT
Start: 2021-07-13 | End: 2021-10-22

## 2021-07-13 NOTE — TELEPHONE ENCOUNTER
Last refill on losartan #90 on 4/15/2021  Last refill on amlodipine #90 on 4/15/2021  Last office visit pertaining to refill on 5/12/2021-cpx  Future Appointments   Date Time Provider Dheeraj Parra   7/30/2021  3:40 PM Greg Bhatia MD [facility]STR[facility]

## 2021-07-20 NOTE — TELEPHONE ENCOUNTER
Requested Prescriptions     Pending Prescriptions Disp Refills   • ASPIRIN 325 MG Oral Tab [Pharmacy Med Name: Aspirin 325 Mg Tab Karin] 90 tablet 0     Sig: TAKE ONE TABLET BY MOUTH ONE TIME DAILY     Last fill was 5/14/21   Last oV 5/12/21  No future OV

## 2021-07-21 RX ORDER — ASPIRIN 325 MG
TABLET ORAL
Qty: 90 TABLET | Refills: 0 | Status: SHIPPED | OUTPATIENT
Start: 2021-07-21

## 2021-07-25 DIAGNOSIS — I16.1 HYPERTENSIVE EMERGENCY: ICD-10-CM

## 2021-07-25 DIAGNOSIS — I67.83 PRES (POSTERIOR REVERSIBLE ENCEPHALOPATHY SYNDROME): ICD-10-CM

## 2021-07-26 RX ORDER — CARVEDILOL 25 MG/1
TABLET ORAL
Qty: 180 TABLET | Refills: 0 | Status: SHIPPED | OUTPATIENT
Start: 2021-07-26 | End: 2021-10-20

## 2021-07-26 NOTE — TELEPHONE ENCOUNTER
Pt requesting refill of CARVEDILOL    Passed protocol, refill approved, sent to pharmacy. Last Office Visit with Provider: 5/12/21    No future appointments.

## 2021-08-16 NOTE — TELEPHONE ENCOUNTER
Pt requesting refill on hydrochlorothiazide 50 MG Oral Tab be sent to [facility]

## 2021-08-17 RX ORDER — HYDROCHLOROTHIAZIDE 50 MG/1
50 TABLET ORAL DAILY
Qty: 90 TABLET | Refills: 0 | Status: SHIPPED | OUTPATIENT
Start: 2021-08-17 | End: 2021-11-18

## 2021-08-17 NOTE — TELEPHONE ENCOUNTER
Pt requesting refill of hydrochlorothiazide    Passed protocol, refill approved, sent to pharmacy. Last Office Visit with Provider: 5/12/21  No future appointments.

## 2021-08-30 RX ORDER — ATORVASTATIN CALCIUM 40 MG/1
TABLET, FILM COATED ORAL
Qty: 90 TABLET | Refills: 0 | OUTPATIENT
Start: 2021-08-30

## 2021-08-30 NOTE — TELEPHONE ENCOUNTER
: Atorvastatin Calcium 40 Mg Tab Nort          Will file in chart as: ATORVASTATIN 40 MG Oral Tab    The original prescription was discontinued on 7/30/2021 by Enrique Boland. Renewing this prescription may not be appropriate.      Sent message to pa

## 2021-09-01 ENCOUNTER — TELEPHONE (OUTPATIENT)
Dept: FAMILY MEDICINE CLINIC | Facility: CLINIC | Age: 41
End: 2021-09-01

## 2021-09-01 DIAGNOSIS — E78.2 MIXED HYPERLIPIDEMIA: Primary | ICD-10-CM

## 2021-09-01 RX ORDER — ATORVASTATIN CALCIUM 40 MG/1
40 TABLET, FILM COATED ORAL NIGHTLY
Qty: 90 TABLET | Refills: 2 | Status: SHIPPED | OUTPATIENT
Start: 2021-09-01 | End: 2021-10-01 | Stop reason: DRUGHIGH

## 2021-09-01 NOTE — TELEPHONE ENCOUNTER
: Atorvastatin Calcium 40 Mg Tab Nort               Will file in chart as: ATORVASTATIN 40 MG Oral Tab     The original prescription was discontinued on 7/30/2021 by Devendra Toney, [facility] Chelsey Du.  Renewing this prescription may not be appropriate.        Chace wit

## 2021-09-01 NOTE — TELEPHONE ENCOUNTER
Key Caputo is calling to find out why his Atorvastatin was discontinued and who the MA that discontinued it, Please call Mil at

## 2021-10-01 ENCOUNTER — OFFICE VISIT (OUTPATIENT)
Dept: FAMILY MEDICINE CLINIC | Facility: CLINIC | Age: 41
End: 2021-10-01
Payer: COMMERCIAL

## 2021-10-01 VITALS
OXYGEN SATURATION: 97 % | HEIGHT: 70 IN | DIASTOLIC BLOOD PRESSURE: 102 MMHG | WEIGHT: 263 LBS | BODY MASS INDEX: 37.65 KG/M2 | TEMPERATURE: 98 F | SYSTOLIC BLOOD PRESSURE: 160 MMHG | HEART RATE: 68 BPM

## 2021-10-01 DIAGNOSIS — E78.6 LOW HDL (UNDER 40): ICD-10-CM

## 2021-10-01 DIAGNOSIS — Z76.89 ENCOUNTER FOR NEW MEDICATION PRESCRIPTION: ICD-10-CM

## 2021-10-01 DIAGNOSIS — R63.5 WEIGHT GAIN: ICD-10-CM

## 2021-10-01 DIAGNOSIS — I10 ESSENTIAL HYPERTENSION, BENIGN: Primary | ICD-10-CM

## 2021-10-01 DIAGNOSIS — Z71.3 ENCOUNTER FOR WEIGHT LOSS COUNSELING: ICD-10-CM

## 2021-10-01 DIAGNOSIS — Z86.73 HISTORY OF CVA (CEREBROVASCULAR ACCIDENT): ICD-10-CM

## 2021-10-01 DIAGNOSIS — E66.01 CLASS 2 SEVERE OBESITY DUE TO EXCESS CALORIES WITH SERIOUS COMORBIDITY AND BODY MASS INDEX (BMI) OF 37.0 TO 37.9 IN ADULT (HCC): ICD-10-CM

## 2021-10-01 DIAGNOSIS — E78.2 MIXED HYPERLIPIDEMIA: ICD-10-CM

## 2021-10-01 PROCEDURE — 3077F SYST BP >= 140 MM HG: CPT | Performed by: FAMILY MEDICINE

## 2021-10-01 PROCEDURE — 3008F BODY MASS INDEX DOCD: CPT | Performed by: FAMILY MEDICINE

## 2021-10-01 PROCEDURE — 99214 OFFICE O/P EST MOD 30 MIN: CPT | Performed by: FAMILY MEDICINE

## 2021-10-01 PROCEDURE — 3080F DIAST BP >= 90 MM HG: CPT | Performed by: FAMILY MEDICINE

## 2021-10-01 RX ORDER — ATORVASTATIN CALCIUM 80 MG/1
80 TABLET, FILM COATED ORAL NIGHTLY
Qty: 90 TABLET | Refills: 1 | Status: SHIPPED | OUTPATIENT
Start: 2021-10-01 | End: 2022-01-05 | Stop reason: DRUGHIGH

## 2021-10-01 RX ORDER — AMLODIPINE BESYLATE 10 MG/1
10 TABLET ORAL NIGHTLY
Qty: 90 TABLET | Refills: 1 | Status: SHIPPED | OUTPATIENT
Start: 2021-10-01 | End: 2022-01-05

## 2021-10-01 RX ORDER — METFORMIN HYDROCHLORIDE 500 MG/1
500 TABLET, EXTENDED RELEASE ORAL 2 TIMES DAILY WITH MEALS
Qty: 60 TABLET | Refills: 1 | Status: SHIPPED | OUTPATIENT
Start: 2021-10-01

## 2021-10-01 NOTE — PATIENT INSTRUCTIONS
Please make an appointment to see the cardiologist and neurologist for follow-up.   Please make an appointment to see the gastroenterologist.

## 2021-10-02 PROBLEM — E66.01 CLASS 2 SEVERE OBESITY DUE TO EXCESS CALORIES WITH SERIOUS COMORBIDITY AND BODY MASS INDEX (BMI) OF 37.0 TO 37.9 IN ADULT (HCC): Status: ACTIVE | Noted: 2021-10-02

## 2021-10-02 PROBLEM — Z86.73 HISTORY OF CVA (CEREBROVASCULAR ACCIDENT): Status: ACTIVE | Noted: 2021-10-02

## 2021-10-02 PROBLEM — R63.5 WEIGHT GAIN: Status: ACTIVE | Noted: 2021-10-02

## 2021-10-02 PROBLEM — E66.01 CLASS 2 SEVERE OBESITY DUE TO EXCESS CALORIES WITH SERIOUS COMORBIDITY AND BODY MASS INDEX (BMI) OF 37.0 TO 37.9 IN ADULT: Status: ACTIVE | Noted: 2021-10-02

## 2021-10-02 PROBLEM — E66.01 CLASS 2 SEVERE OBESITY DUE TO EXCESS CALORIES WITH SERIOUS COMORBIDITY AND BODY MASS INDEX (BMI) OF 37.0 TO 37.9 IN ADULT  (HCC): Status: ACTIVE | Noted: 2021-10-02

## 2021-10-02 PROBLEM — Z71.3 ENCOUNTER FOR WEIGHT LOSS COUNSELING: Status: ACTIVE | Noted: 2021-10-02

## 2021-10-02 PROBLEM — E66.812 CLASS 2 SEVERE OBESITY DUE TO EXCESS CALORIES WITH SERIOUS COMORBIDITY AND BODY MASS INDEX (BMI) OF 37.0 TO 37.9 IN ADULT (HCC): Status: ACTIVE | Noted: 2021-10-02

## 2021-10-20 DIAGNOSIS — I16.1 HYPERTENSIVE EMERGENCY: ICD-10-CM

## 2021-10-20 DIAGNOSIS — I67.83 PRES (POSTERIOR REVERSIBLE ENCEPHALOPATHY SYNDROME): ICD-10-CM

## 2021-10-20 RX ORDER — CARVEDILOL 25 MG/1
TABLET ORAL
Qty: 180 TABLET | Refills: 0 | Status: SHIPPED | OUTPATIENT
Start: 2021-10-20 | End: 2022-01-05

## 2021-10-20 NOTE — TELEPHONE ENCOUNTER
Requested Prescriptions     Signed Prescriptions Disp Refills   • CARVEDILOL 25 MG Oral Tab 180 tablet 0     Sig: TAKE ONE TABLET BY MOUTH TWO TIMES DAILY WITH MEALS     Authorizing Provider: Calvin Parekh     Ordering User: Prema ledesma

## 2021-10-22 RX ORDER — LOSARTAN POTASSIUM 50 MG/1
TABLET ORAL
Qty: 90 TABLET | Refills: 0 | Status: SHIPPED | OUTPATIENT
Start: 2021-10-22 | End: 2022-01-05

## 2021-10-22 NOTE — TELEPHONE ENCOUNTER
Requested Prescriptions     Signed Prescriptions Disp Refills   • LOSARTAN 50 MG Oral Tab 90 tablet 0     Sig: TAKE ONE TABLET BY MOUTH EVERY EVENING     Authorizing Provider: Halle Gilmore     Ordering User: Rubens Rico     Met protocol.  Last OV 10/1

## 2021-11-05 ENCOUNTER — APPOINTMENT (OUTPATIENT)
Dept: CARDIOLOGY | Age: 41
End: 2021-11-05

## 2021-11-18 RX ORDER — HYDROCHLOROTHIAZIDE 50 MG/1
50 TABLET ORAL DAILY
Qty: 90 TABLET | Refills: 0 | Status: SHIPPED | OUTPATIENT
Start: 2021-11-18 | End: 2022-01-05

## 2021-11-18 NOTE — TELEPHONE ENCOUNTER
Requested Prescriptions     Signed Prescriptions Disp Refills   • HYDROCHLOROTHIAZIDE 50 MG Oral Tab 90 tablet 0     Sig: Take 1 tablet (50 mg total) by mouth daily.      Authorizing Provider: Harvey Godl     Ordering User: Jagruti ledesma

## 2021-11-22 ENCOUNTER — LAB ENCOUNTER (OUTPATIENT)
Dept: LAB | Age: 41
End: 2021-11-22
Attending: FAMILY MEDICINE
Payer: COMMERCIAL

## 2021-11-22 DIAGNOSIS — E78.2 MIXED HYPERLIPIDEMIA: ICD-10-CM

## 2021-11-22 DIAGNOSIS — K76.0 NAFLD (NONALCOHOLIC FATTY LIVER DISEASE): ICD-10-CM

## 2021-11-22 DIAGNOSIS — I10 ESSENTIAL HYPERTENSION, BENIGN: ICD-10-CM

## 2021-11-22 DIAGNOSIS — R74.8 ELEVATED LIVER ENZYMES: ICD-10-CM

## 2021-11-22 DIAGNOSIS — R63.5 WEIGHT GAIN: ICD-10-CM

## 2021-11-22 PROCEDURE — 80050 GENERAL HEALTH PANEL: CPT | Performed by: FAMILY MEDICINE

## 2021-11-22 PROCEDURE — 86003 ALLG SPEC IGE CRUDE XTRC EA: CPT | Performed by: FAMILY MEDICINE

## 2021-11-22 PROCEDURE — 82785 ASSAY OF IGE: CPT | Performed by: FAMILY MEDICINE

## 2021-11-22 PROCEDURE — 84443 ASSAY THYROID STIM HORMONE: CPT | Performed by: FAMILY MEDICINE

## 2021-11-22 PROCEDURE — 84439 ASSAY OF FREE THYROXINE: CPT | Performed by: FAMILY MEDICINE

## 2021-11-22 PROCEDURE — 80061 LIPID PANEL: CPT | Performed by: FAMILY MEDICINE

## 2022-01-05 ENCOUNTER — TELEMEDICINE (OUTPATIENT)
Dept: FAMILY MEDICINE CLINIC | Facility: CLINIC | Age: 42
End: 2022-01-05
Payer: COMMERCIAL

## 2022-01-05 ENCOUNTER — TELEPHONE (OUTPATIENT)
Dept: FAMILY MEDICINE CLINIC | Facility: CLINIC | Age: 42
End: 2022-01-05

## 2022-01-05 DIAGNOSIS — J02.9 SORE THROAT: ICD-10-CM

## 2022-01-05 DIAGNOSIS — R05.9 COUGH: ICD-10-CM

## 2022-01-05 DIAGNOSIS — E78.2 MIXED HYPERLIPIDEMIA: ICD-10-CM

## 2022-01-05 DIAGNOSIS — Z86.73 HISTORY OF CVA (CEREBROVASCULAR ACCIDENT): ICD-10-CM

## 2022-01-05 DIAGNOSIS — I10 ESSENTIAL HYPERTENSION, BENIGN: Primary | ICD-10-CM

## 2022-01-05 DIAGNOSIS — I10 ESSENTIAL HYPERTENSION, BENIGN: ICD-10-CM

## 2022-01-05 DIAGNOSIS — E78.6 LOW HDL (UNDER 40): ICD-10-CM

## 2022-01-05 DIAGNOSIS — Z28.21 COVID-19 VACCINATION REFUSED: ICD-10-CM

## 2022-01-05 DIAGNOSIS — R55 SYNCOPE, UNSPECIFIED SYNCOPE TYPE: Primary | ICD-10-CM

## 2022-01-05 PROCEDURE — 99214 OFFICE O/P EST MOD 30 MIN: CPT | Performed by: FAMILY MEDICINE

## 2022-01-05 RX ORDER — HYDROCHLOROTHIAZIDE 50 MG/1
50 TABLET ORAL DAILY
Qty: 90 TABLET | Refills: 3 | Status: SHIPPED | OUTPATIENT
Start: 2022-01-05

## 2022-01-05 RX ORDER — LOSARTAN POTASSIUM 50 MG/1
50 TABLET ORAL EVERY EVENING
Qty: 90 TABLET | Refills: 3 | Status: SHIPPED | OUTPATIENT
Start: 2022-01-05 | End: 2022-01-06 | Stop reason: RX

## 2022-01-05 RX ORDER — CARVEDILOL 25 MG/1
25 TABLET ORAL 2 TIMES DAILY WITH MEALS
Qty: 180 TABLET | Refills: 3 | Status: SHIPPED | OUTPATIENT
Start: 2022-01-05

## 2022-01-05 RX ORDER — ATORVASTATIN CALCIUM 40 MG/1
40 TABLET, FILM COATED ORAL NIGHTLY
COMMUNITY
Start: 2021-11-27 | End: 2022-01-05 | Stop reason: CLARIF

## 2022-01-05 RX ORDER — AMLODIPINE BESYLATE 10 MG/1
10 TABLET ORAL NIGHTLY
Qty: 90 TABLET | Refills: 3 | Status: SHIPPED | OUTPATIENT
Start: 2022-01-05

## 2022-01-05 RX ORDER — ATORVASTATIN CALCIUM 80 MG/1
80 TABLET, FILM COATED ORAL NIGHTLY
Qty: 90 TABLET | Refills: 3 | Status: SHIPPED | OUTPATIENT
Start: 2022-01-05

## 2022-01-05 NOTE — PROGRESS NOTES
MyChart video visit with live interactive synchronous audio and video    Dania Al verbally consents to a MyChart video visit with live interactive synchronous audio and video service on 01/05/22.   Patient has been referred to the [facility] for his hypertension, amlodipine 10 mg nightly, carvedilol 25 mg twice a day, hydrochlorothiazide 50 mg every morning, losartan 50 mg nightly. No side effects noticed from the medications. Mixed hyperlipidemia:  Associated with low HDL.   Patient marsha kidney injury) ([facility] [address]     PRES (posterior reversible encephalopathy syndrome)     Word finding difficulty     Elevated liver enzymes     Dyslipidemia     Allergic rhinitis     NAFLD (nonalcoholic fatty liver disease)     History of CVA (cerebrovascular acci No audible dyspnea. No audible wheezing. No increased work of breathing. NEURO: Alert and oriented x3  PSYCH: Affect normal.  Intonation of voice is normal.  No pressured speech.       DATA:      Component      Latest Ref Rng & Units 11/22/2021 5/12/2021 continue. HDL persistently below normal.  Continue medication as below. Patient advised to follow-up with cardiologist, referral placed, patient provided contact information.    - atorvastatin 80 MG Oral Tab; Take 1 tablet (80 mg total) by mouth nightly.

## 2022-01-05 NOTE — TELEPHONE ENCOUNTER
[facility] pharmacy calling the prescription that doctor sent over is on backorder would like to switch to something else.   Please call the pharmacy at [phone] Pj Clements

## 2022-01-06 RX ORDER — OLMESARTAN MEDOXOMIL 20 MG/1
20 TABLET ORAL NIGHTLY
Qty: 90 TABLET | Refills: 0 | Status: SHIPPED | OUTPATIENT
Start: 2022-01-06

## 2022-01-06 NOTE — TELEPHONE ENCOUNTER
New prescription sent for olmesartan in place of losartan, please inform patient. Also, please remind patient to schedule follow-up appointment with me.

## 2022-01-06 NOTE — TELEPHONE ENCOUNTER
Phoned pharmacy. They state the losartan 50mg is on back order and has been for several weeks. None of their other stores have it either. Please advise.

## 2022-02-17 RX ORDER — HYDROCHLOROTHIAZIDE 50 MG/1
50 TABLET ORAL DAILY
Qty: 90 TABLET | Refills: 0 | OUTPATIENT
Start: 2022-02-17

## 2022-04-14 RX ORDER — OLMESARTAN MEDOXOMIL 20 MG/1
TABLET ORAL
Qty: 90 TABLET | Refills: 0 | Status: SHIPPED | OUTPATIENT
Start: 2022-04-14

## 2022-04-14 NOTE — TELEPHONE ENCOUNTER
Pt requesting refill of Olmesartan Medoxomil 20 Mg Tab Jamal    Last Time Medication was Prescribed :  01/06/2022    Last Office Visit with Provider: Telemedicine 01/05/2022     Recommended to return by Provider: Return in about 2 weeks (around 1/19/2022) for uncontrolled hypertension. No future appointments.      Passed protocol, refill approved, sent to pharmacy  Hypertension Medications Protocol Failed 04/12/2022 05:08 PM   Protocol Details  Appointment in past 6 or next 3 months    CMP or BMP in past 12 months    Last serum creatinine< 2.0

## 2022-05-25 ENCOUNTER — OFFICE VISIT (OUTPATIENT)
Dept: FAMILY MEDICINE CLINIC | Facility: CLINIC | Age: 42
End: 2022-05-25
Payer: COMMERCIAL

## 2022-05-25 VITALS
HEART RATE: 88 BPM | RESPIRATION RATE: 18 BRPM | HEIGHT: 70 IN | DIASTOLIC BLOOD PRESSURE: 82 MMHG | BODY MASS INDEX: 39.4 KG/M2 | OXYGEN SATURATION: 97 % | TEMPERATURE: 97 F | WEIGHT: 275.19 LBS | SYSTOLIC BLOOD PRESSURE: 128 MMHG

## 2022-05-25 DIAGNOSIS — E66.01 CLASS 2 SEVERE OBESITY DUE TO EXCESS CALORIES WITH SERIOUS COMORBIDITY AND BODY MASS INDEX (BMI) OF 39.0 TO 39.9 IN ADULT (HCC): ICD-10-CM

## 2022-05-25 DIAGNOSIS — R73.03 PREDIABETES: ICD-10-CM

## 2022-05-25 DIAGNOSIS — G47.33 OSA (OBSTRUCTIVE SLEEP APNEA): Primary | ICD-10-CM

## 2022-05-25 DIAGNOSIS — R60.0 BILATERAL LOWER EXTREMITY EDEMA: ICD-10-CM

## 2022-05-25 DIAGNOSIS — E78.2 MIXED HYPERLIPIDEMIA: ICD-10-CM

## 2022-05-25 DIAGNOSIS — Z76.89 ENCOUNTER FOR NEW MEDICATION PRESCRIPTION: ICD-10-CM

## 2022-05-25 DIAGNOSIS — Z86.73 HISTORY OF CVA (CEREBROVASCULAR ACCIDENT): ICD-10-CM

## 2022-05-25 DIAGNOSIS — Z71.3 ENCOUNTER FOR WEIGHT LOSS COUNSELING: ICD-10-CM

## 2022-05-25 DIAGNOSIS — E78.6 LOW HDL (UNDER 40): ICD-10-CM

## 2022-05-25 DIAGNOSIS — L85.3 DRY SKIN: ICD-10-CM

## 2022-05-25 DIAGNOSIS — R73.9 HYPERGLYCEMIA: ICD-10-CM

## 2022-05-25 DIAGNOSIS — I10 ESSENTIAL HYPERTENSION, BENIGN: ICD-10-CM

## 2022-05-25 PROBLEM — E66.812 CLASS 2 SEVERE OBESITY DUE TO EXCESS CALORIES WITH SERIOUS COMORBIDITY AND BODY MASS INDEX (BMI) OF 39.0 TO 39.9 IN ADULT (HCC): Status: ACTIVE | Noted: 2022-05-25

## 2022-05-25 LAB
CARTRIDGE LOT#: 889 NUMERIC
HEMOGLOBIN A1C: 6 % (ref 4.3–5.6)

## 2022-05-25 PROCEDURE — 83036 HEMOGLOBIN GLYCOSYLATED A1C: CPT | Performed by: FAMILY MEDICINE

## 2022-05-25 PROCEDURE — 99215 OFFICE O/P EST HI 40 MIN: CPT | Performed by: FAMILY MEDICINE

## 2022-05-25 PROCEDURE — 3008F BODY MASS INDEX DOCD: CPT | Performed by: FAMILY MEDICINE

## 2022-05-25 PROCEDURE — 3079F DIAST BP 80-89 MM HG: CPT | Performed by: FAMILY MEDICINE

## 2022-05-25 PROCEDURE — 3074F SYST BP LT 130 MM HG: CPT | Performed by: FAMILY MEDICINE

## 2022-05-25 RX ORDER — SEMAGLUTIDE 0.25 MG/.5ML
0.25 INJECTION, SOLUTION SUBCUTANEOUS
Qty: 4 EACH | Refills: 0 | Status: SHIPPED | OUTPATIENT
Start: 2022-05-25

## 2022-05-25 RX ORDER — SEMAGLUTIDE 0.5 MG/.5ML
0.5 INJECTION, SOLUTION SUBCUTANEOUS
Qty: 4 EACH | Refills: 1 | Status: SHIPPED | OUTPATIENT
Start: 2022-05-25 | End: 2022-05-25

## 2022-05-25 RX ORDER — SEMAGLUTIDE 0.5 MG/.5ML
0.5 INJECTION, SOLUTION SUBCUTANEOUS
Qty: 4 EACH | Refills: 1 | Status: SHIPPED | OUTPATIENT
Start: 2022-06-22

## 2022-05-31 PROBLEM — G47.33 OSA (OBSTRUCTIVE SLEEP APNEA): Status: ACTIVE | Noted: 2022-05-31

## 2022-06-08 ENCOUNTER — TELEPHONE (OUTPATIENT)
Dept: FAMILY MEDICINE CLINIC | Facility: CLINIC | Age: 42
End: 2022-06-08

## 2022-06-08 DIAGNOSIS — Z76.89 ENCOUNTER FOR NEW MEDICATION PRESCRIPTION: ICD-10-CM

## 2022-06-08 DIAGNOSIS — Z71.3 ENCOUNTER FOR WEIGHT LOSS COUNSELING: ICD-10-CM

## 2022-06-08 DIAGNOSIS — R73.03 PREDIABETES: ICD-10-CM

## 2022-06-08 RX ORDER — SEMAGLUTIDE 0.5 MG/.5ML
0.5 INJECTION, SOLUTION SUBCUTANEOUS
Qty: 4 EACH | Refills: 1 | Status: SHIPPED | OUTPATIENT
Start: 2022-06-22

## 2022-06-08 RX ORDER — SEMAGLUTIDE 0.25 MG/.5ML
0.25 INJECTION, SOLUTION SUBCUTANEOUS
Qty: 4 EACH | Refills: 0 | Status: SHIPPED | OUTPATIENT
Start: 2022-06-08

## 2022-06-08 NOTE — TELEPHONE ENCOUNTER
Patient calling was prescribed at last ov  semaglutide-weight management (WEGOVY) 0.5 MG/0.5ML Subcutaneous Solution Lauro Blanton is on back order

## 2022-06-08 NOTE — TELEPHONE ENCOUNTER
Scripts sent to the alternate pharmacy. Patient is currently on the 0.25mg dose. He has not yet started the medication. He is requesting both scripts be sent so they are at the alternate pharmacy.

## 2022-06-27 ENCOUNTER — LAB ENCOUNTER (OUTPATIENT)
Dept: LAB | Age: 42
End: 2022-06-27
Attending: FAMILY MEDICINE
Payer: COMMERCIAL

## 2022-06-27 DIAGNOSIS — Z11.59 SCREENING FOR VIRAL DISEASE: ICD-10-CM

## 2022-06-27 DIAGNOSIS — Z01.818 PREOP EXAMINATION: ICD-10-CM

## 2022-06-28 LAB — SARS-COV-2 RNA RESP QL NAA+PROBE: NOT DETECTED

## 2022-06-29 ENCOUNTER — OFFICE VISIT (OUTPATIENT)
Dept: SLEEP CENTER | Age: 42
End: 2022-06-29
Attending: FAMILY MEDICINE
Payer: COMMERCIAL

## 2022-06-29 DIAGNOSIS — G47.33 OSA (OBSTRUCTIVE SLEEP APNEA): ICD-10-CM

## 2022-06-29 DIAGNOSIS — E66.01 CLASS 2 SEVERE OBESITY DUE TO EXCESS CALORIES WITH SERIOUS COMORBIDITY AND BODY MASS INDEX (BMI) OF 39.0 TO 39.9 IN ADULT (HCC): ICD-10-CM

## 2022-06-29 DIAGNOSIS — I10 ESSENTIAL HYPERTENSION, BENIGN: ICD-10-CM

## 2022-06-29 DIAGNOSIS — Z86.73 HISTORY OF CVA (CEREBROVASCULAR ACCIDENT): ICD-10-CM

## 2022-06-29 PROCEDURE — 95811 POLYSOM 6/>YRS CPAP 4/> PARM: CPT

## 2022-07-20 DIAGNOSIS — I10 ESSENTIAL HYPERTENSION, BENIGN: ICD-10-CM

## 2022-07-21 ENCOUNTER — TELEPHONE (OUTPATIENT)
Dept: FAMILY MEDICINE CLINIC | Facility: CLINIC | Age: 42
End: 2022-07-21

## 2022-07-21 RX ORDER — OLMESARTAN MEDOXOMIL 20 MG/1
TABLET ORAL
Qty: 90 TABLET | Refills: 0 | Status: SHIPPED | OUTPATIENT
Start: 2022-07-21

## 2022-07-21 NOTE — TELEPHONE ENCOUNTER
Per Janie with [facility], medication will be ready to  soon, patient will receive text when ready. Patient informed.

## 2022-07-21 NOTE — TELEPHONE ENCOUNTER
Patient called states he ran out of med on tues, - it was called in on Monday and still not ready yet.   Can we call his pharmacy and see why

## 2022-09-06 ENCOUNTER — TELEPHONE (OUTPATIENT)
Dept: INTERNAL MEDICINE CLINIC | Facility: CLINIC | Age: 42
End: 2022-09-06

## 2022-09-14 ENCOUNTER — TELEPHONE (OUTPATIENT)
Dept: FAMILY MEDICINE CLINIC | Facility: CLINIC | Age: 42
End: 2022-09-14

## 2022-09-14 NOTE — TELEPHONE ENCOUNTER
Please call patient and instruct him to make an appointment to see the sleep specialist, please refer to the communication below. Marissa Lane,     Your patient was scheduled for consultation on [phone] but never showed up for his appointment with Dr. Kayla Fan and was non-responsive to calls after he did not show up. He completed his titration and it is reccommended he be   placed on an APAP device but we cannot order it for him until he agrees to establish care with the specialist. I hope this message is of assistance as I see you have tried to reach him as well. Kind regards,   Hyun Luevano c/o Dr. Maribel Diamond   *please note we   [facility] as of 9- and our EPIC contact information will change.

## 2022-09-15 NOTE — TELEPHONE ENCOUNTER
Patient informed, verbalized understanding.     Given contact information for 's office:     [facility] ([facility])    [facility] [address]  [place], [address] [facility]    [phone]

## 2022-09-18 ENCOUNTER — HOSPITAL ENCOUNTER (OUTPATIENT)
Dept: CT IMAGING | Age: 42
Discharge: HOME | End: 2022-09-18
Attending: FAMILY MEDICINE

## 2022-09-18 DIAGNOSIS — Z00.00 PREVENTATIVE HEALTH CARE: ICD-10-CM

## 2022-10-10 ENCOUNTER — TELEPHONE (OUTPATIENT)
Dept: FAMILY MEDICINE CLINIC | Facility: CLINIC | Age: 42
End: 2022-10-10

## 2022-10-10 NOTE — TELEPHONE ENCOUNTER
----- Message from Oralia Vick DO sent at 10/9/2022  1:49 PM CDT -----  Please call patient:Please verify patient is aware that his test results of the heart scan are consistent with plaque buildup within the coronary arteries, recommend patient follow-up with Dr. Matt Engle, cardiologist.    Left msg for pt with results/instructions above. Asked pt after reviewing message to call office with any questions.

## 2022-10-17 DIAGNOSIS — I10 ESSENTIAL HYPERTENSION, BENIGN: ICD-10-CM

## 2022-10-18 RX ORDER — OLMESARTAN MEDOXOMIL 20 MG/1
TABLET ORAL
Qty: 90 TABLET | Refills: 0 | Status: SHIPPED | OUTPATIENT
Start: 2022-10-18

## 2022-10-27 ENCOUNTER — LAB ENCOUNTER (OUTPATIENT)
Dept: LAB | Age: 42
End: 2022-10-27
Attending: INTERNAL MEDICINE
Payer: COMMERCIAL

## 2022-10-27 DIAGNOSIS — E78.2 MIXED HYPERLIPIDEMIA: ICD-10-CM

## 2022-10-27 DIAGNOSIS — R93.1 ELEVATED CORONARY ARTERY CALCIUM SCORE: Primary | ICD-10-CM

## 2022-10-27 DIAGNOSIS — I10 ESSENTIAL HYPERTENSION, BENIGN: ICD-10-CM

## 2022-10-27 LAB
ALBUMIN SERPL-MCNC: 4.6 G/DL (ref 3.4–5)
ALBUMIN/GLOB SERPL: 1.6 {RATIO} (ref 1–2)
ALP LIVER SERPL-CCNC: 72 U/L
ALT SERPL-CCNC: 72 U/L
ANION GAP SERPL CALC-SCNC: 7 MMOL/L (ref 0–18)
AST SERPL-CCNC: 32 U/L (ref 15–37)
BILIRUB SERPL-MCNC: 1 MG/DL (ref 0.1–2)
BUN BLD-MCNC: 11 MG/DL (ref 7–18)
CALCIUM BLD-MCNC: 9.3 MG/DL (ref 8.5–10.1)
CHLORIDE SERPL-SCNC: 105 MMOL/L (ref 98–112)
CHOLEST SERPL-MCNC: 143 MG/DL (ref ?–200)
CO2 SERPL-SCNC: 30 MMOL/L (ref 21–32)
CREAT BLD-MCNC: 0.92 MG/DL
FASTING PATIENT LIPID ANSWER: YES
FASTING STATUS PATIENT QL REPORTED: YES
GFR SERPLBLD BASED ON 1.73 SQ M-ARVRAT: 108 ML/MIN/1.73M2 (ref 60–?)
GLOBULIN PLAS-MCNC: 2.8 G/DL (ref 2.8–4.4)
GLUCOSE BLD-MCNC: 113 MG/DL (ref 70–99)
HDLC SERPL-MCNC: 36 MG/DL (ref 40–59)
LDLC SERPL CALC-MCNC: 83 MG/DL (ref ?–100)
NONHDLC SERPL-MCNC: 107 MG/DL (ref ?–130)
OSMOLALITY SERPL CALC.SUM OF ELEC: 294 MOSM/KG (ref 275–295)
POTASSIUM SERPL-SCNC: 3.4 MMOL/L (ref 3.5–5.1)
PROT SERPL-MCNC: 7.4 G/DL (ref 6.4–8.2)
SODIUM SERPL-SCNC: 142 MMOL/L (ref 136–145)
TRIGL SERPL-MCNC: 132 MG/DL (ref 30–149)
VLDLC SERPL CALC-MCNC: 21 MG/DL (ref 0–30)

## 2022-10-27 PROCEDURE — 80053 COMPREHEN METABOLIC PANEL: CPT

## 2022-10-27 PROCEDURE — 36415 COLL VENOUS BLD VENIPUNCTURE: CPT

## 2022-10-27 PROCEDURE — 80061 LIPID PANEL: CPT

## 2022-11-09 ENCOUNTER — OFFICE VISIT (OUTPATIENT)
Dept: INTERNAL MEDICINE CLINIC | Facility: CLINIC | Age: 42
End: 2022-11-09
Payer: COMMERCIAL

## 2022-11-09 VITALS
SYSTOLIC BLOOD PRESSURE: 130 MMHG | HEIGHT: 70 IN | DIASTOLIC BLOOD PRESSURE: 84 MMHG | RESPIRATION RATE: 16 BRPM | BODY MASS INDEX: 39.22 KG/M2 | HEART RATE: 86 BPM | WEIGHT: 274 LBS

## 2022-11-09 DIAGNOSIS — E78.2 MIXED HYPERLIPIDEMIA: ICD-10-CM

## 2022-11-09 DIAGNOSIS — E66.01 CLASS 2 SEVERE OBESITY WITH SERIOUS COMORBIDITY AND BODY MASS INDEX (BMI) OF 39.0 TO 39.9 IN ADULT, UNSPECIFIED OBESITY TYPE (HCC): ICD-10-CM

## 2022-11-09 DIAGNOSIS — Z51.81 ENCOUNTER FOR THERAPEUTIC DRUG MONITORING: Primary | ICD-10-CM

## 2022-11-09 DIAGNOSIS — R73.03 PREDIABETES: ICD-10-CM

## 2022-11-09 DIAGNOSIS — Z86.73 HISTORY OF CVA (CEREBROVASCULAR ACCIDENT): ICD-10-CM

## 2022-11-09 PROCEDURE — 3079F DIAST BP 80-89 MM HG: CPT | Performed by: PHYSICIAN ASSISTANT

## 2022-11-09 PROCEDURE — 3008F BODY MASS INDEX DOCD: CPT | Performed by: PHYSICIAN ASSISTANT

## 2022-11-09 PROCEDURE — 3075F SYST BP GE 130 - 139MM HG: CPT | Performed by: PHYSICIAN ASSISTANT

## 2022-11-09 PROCEDURE — 99204 OFFICE O/P NEW MOD 45 MIN: CPT | Performed by: PHYSICIAN ASSISTANT

## 2022-11-09 RX ORDER — SEMAGLUTIDE 1.34 MG/ML
INJECTION, SOLUTION SUBCUTANEOUS WEEKLY
COMMUNITY

## 2023-01-02 DIAGNOSIS — E78.6 LOW HDL (UNDER 40): ICD-10-CM

## 2023-01-02 DIAGNOSIS — E78.2 MIXED HYPERLIPIDEMIA: ICD-10-CM

## 2023-01-02 DIAGNOSIS — Z86.73 HISTORY OF CVA (CEREBROVASCULAR ACCIDENT): ICD-10-CM

## 2023-01-03 RX ORDER — ATORVASTATIN CALCIUM 80 MG/1
TABLET, FILM COATED ORAL
Qty: 90 TABLET | Refills: 0 | Status: SHIPPED | OUTPATIENT
Start: 2023-01-03

## 2023-01-06 ENCOUNTER — TELEPHONE (OUTPATIENT)
Dept: INTERNAL MEDICINE CLINIC | Facility: CLINIC | Age: 43
End: 2023-01-06

## 2023-01-06 NOTE — TELEPHONE ENCOUNTER
Patient called and wanted to follow up on conversation he had with Lolis Lopez regarding his Ozempic prescription. He said she was going to follow up with his insurance provider to try to get the drug covered going forward. His next appt with her is 1/30/23. semaglutide (OZEMPIC, 0.25 OR 0.5 MG/DOSE,) 2 MG/1.5ML Subcutaneous Solution Pen-injector    [id number] - [address]    Please contact him at [phone].

## 2023-01-17 DIAGNOSIS — I10 ESSENTIAL HYPERTENSION, BENIGN: ICD-10-CM

## 2023-01-17 DIAGNOSIS — Z86.73 HISTORY OF CVA (CEREBROVASCULAR ACCIDENT): ICD-10-CM

## 2023-01-17 RX ORDER — OLMESARTAN MEDOXOMIL 20 MG/1
TABLET ORAL
Qty: 14 TABLET | Refills: 0 | Status: SHIPPED | OUTPATIENT
Start: 2023-01-17 | End: 2023-01-19

## 2023-01-18 RX ORDER — AMLODIPINE BESYLATE 10 MG/1
10 TABLET ORAL NIGHTLY
Qty: 30 TABLET | Refills: 0 | Status: SHIPPED | OUTPATIENT
Start: 2023-01-18 | End: 2023-01-19

## 2023-01-19 ENCOUNTER — OFFICE VISIT (OUTPATIENT)
Dept: FAMILY MEDICINE CLINIC | Facility: CLINIC | Age: 43
End: 2023-01-19
Payer: COMMERCIAL

## 2023-01-19 ENCOUNTER — TELEPHONE (OUTPATIENT)
Dept: FAMILY MEDICINE CLINIC | Facility: CLINIC | Age: 43
End: 2023-01-19

## 2023-01-19 VITALS
HEIGHT: 70 IN | BODY MASS INDEX: 38.71 KG/M2 | WEIGHT: 270.38 LBS | DIASTOLIC BLOOD PRESSURE: 82 MMHG | SYSTOLIC BLOOD PRESSURE: 130 MMHG | HEART RATE: 69 BPM | RESPIRATION RATE: 20 BRPM | TEMPERATURE: 98 F | OXYGEN SATURATION: 99 %

## 2023-01-19 DIAGNOSIS — E78.2 MIXED HYPERLIPIDEMIA: ICD-10-CM

## 2023-01-19 DIAGNOSIS — I67.83 PRES (POSTERIOR REVERSIBLE ENCEPHALOPATHY SYNDROME): ICD-10-CM

## 2023-01-19 DIAGNOSIS — Z00.00 ROUTINE GENERAL MEDICAL EXAMINATION AT A HEALTH CARE FACILITY: Primary | ICD-10-CM

## 2023-01-19 DIAGNOSIS — Z12.5 SCREENING FOR PROSTATE CANCER: ICD-10-CM

## 2023-01-19 DIAGNOSIS — Z86.73 HISTORY OF CVA (CEREBROVASCULAR ACCIDENT): ICD-10-CM

## 2023-01-19 DIAGNOSIS — E66.01 CLASS 2 SEVERE OBESITY DUE TO EXCESS CALORIES WITH SERIOUS COMORBIDITY AND BODY MASS INDEX (BMI) OF 38.0 TO 38.9 IN ADULT (HCC): ICD-10-CM

## 2023-01-19 DIAGNOSIS — I87.2 VENOUS INSUFFICIENCY OF BOTH LOWER EXTREMITIES: ICD-10-CM

## 2023-01-19 DIAGNOSIS — R73.03 PREDIABETES: ICD-10-CM

## 2023-01-19 DIAGNOSIS — I10 ESSENTIAL HYPERTENSION, BENIGN: ICD-10-CM

## 2023-01-19 DIAGNOSIS — G47.33 OSA (OBSTRUCTIVE SLEEP APNEA): ICD-10-CM

## 2023-01-19 DIAGNOSIS — E78.6 LOW HDL (UNDER 40): ICD-10-CM

## 2023-01-19 PROBLEM — E66.812 CLASS 2 SEVERE OBESITY DUE TO EXCESS CALORIES WITH SERIOUS COMORBIDITY AND BODY MASS INDEX (BMI) OF 38.0 TO 38.9 IN ADULT (HCC): Status: ACTIVE | Noted: 2023-01-19

## 2023-01-19 PROBLEM — I63.9 FOCAL INFARCTION OF BRAIN (HCC): Status: RESOLVED | Noted: 2021-03-12 | Resolved: 2023-01-19

## 2023-01-19 PROBLEM — E66.812 CLASS 2 SEVERE OBESITY DUE TO EXCESS CALORIES WITH SERIOUS COMORBIDITY AND BODY MASS INDEX (BMI) OF 39.0 TO 39.9 IN ADULT (HCC): Status: RESOLVED | Noted: 2022-05-25 | Resolved: 2023-01-19

## 2023-01-19 PROBLEM — E66.812 CLASS 2 SEVERE OBESITY DUE TO EXCESS CALORIES WITH SERIOUS COMORBIDITY AND BODY MASS INDEX (BMI) OF 37.0 TO 37.9 IN ADULT (HCC): Status: RESOLVED | Noted: 2021-10-02 | Resolved: 2023-01-19

## 2023-01-19 PROBLEM — R93.1 ELEVATED CORONARY ARTERY CALCIUM SCORE: Status: ACTIVE | Noted: 2022-10-14

## 2023-01-19 PROCEDURE — 3075F SYST BP GE 130 - 139MM HG: CPT | Performed by: FAMILY MEDICINE

## 2023-01-19 PROCEDURE — 99396 PREV VISIT EST AGE 40-64: CPT | Performed by: FAMILY MEDICINE

## 2023-01-19 PROCEDURE — 3008F BODY MASS INDEX DOCD: CPT | Performed by: FAMILY MEDICINE

## 2023-01-19 PROCEDURE — 3079F DIAST BP 80-89 MM HG: CPT | Performed by: FAMILY MEDICINE

## 2023-01-19 PROCEDURE — 99214 OFFICE O/P EST MOD 30 MIN: CPT | Performed by: FAMILY MEDICINE

## 2023-01-19 RX ORDER — OLMESARTAN MEDOXOMIL 20 MG/1
20 TABLET ORAL NIGHTLY
Qty: 90 TABLET | Refills: 3 | Status: SHIPPED | OUTPATIENT
Start: 2023-01-19

## 2023-01-19 RX ORDER — AMLODIPINE BESYLATE 10 MG/1
10 TABLET ORAL NIGHTLY
Qty: 90 TABLET | Refills: 3 | Status: SHIPPED | OUTPATIENT
Start: 2023-01-19

## 2023-01-19 RX ORDER — HYDROCHLOROTHIAZIDE 50 MG/1
50 TABLET ORAL DAILY
Qty: 90 TABLET | Refills: 3 | Status: SHIPPED | OUTPATIENT
Start: 2023-01-19

## 2023-01-19 RX ORDER — CARVEDILOL 25 MG/1
25 TABLET ORAL 2 TIMES DAILY WITH MEALS
Qty: 180 TABLET | Refills: 3 | Status: SHIPPED | OUTPATIENT
Start: 2023-01-19

## 2023-01-19 RX ORDER — ATORVASTATIN CALCIUM 80 MG/1
80 TABLET, FILM COATED ORAL NIGHTLY
Qty: 90 TABLET | Refills: 3 | Status: SHIPPED | OUTPATIENT
Start: 2023-01-19

## 2023-01-19 NOTE — TELEPHONE ENCOUNTER
Patient informed Dr. Thi Campbell he was having a difficult time scheduling with pul. I did reach out to pul. They state he was a no show for an appointment in [address] 7 of 2021 and did not call to schedule in June of 22 after sleep study. I did reach out to patient and provided phone number for Dr. Kamilah Rodriges 's office and contact of Tio Siddiqi to call and schedule. I did explain an appointment is necessary to establish care and for examination. Patient states he will call to schedule.

## 2023-04-06 ENCOUNTER — WALK IN (OUTPATIENT)
Dept: URGENT CARE | Age: 43
End: 2023-04-06

## 2023-04-06 VITALS
HEART RATE: 80 BPM | SYSTOLIC BLOOD PRESSURE: 150 MMHG | WEIGHT: 282.19 LBS | DIASTOLIC BLOOD PRESSURE: 96 MMHG | BODY MASS INDEX: 40.49 KG/M2 | TEMPERATURE: 98 F | OXYGEN SATURATION: 100 % | RESPIRATION RATE: 20 BRPM

## 2023-04-06 DIAGNOSIS — L53.9 ERYTHEMA OF SKIN OF EYELID: ICD-10-CM

## 2023-04-06 DIAGNOSIS — H00.012 HORDEOLUM EXTERNUM OF RIGHT LOWER EYELID: Primary | ICD-10-CM

## 2023-04-06 DIAGNOSIS — I10 ESSENTIAL HYPERTENSION: ICD-10-CM

## 2023-04-06 PROCEDURE — 3077F SYST BP >= 140 MM HG: CPT | Performed by: NURSE PRACTITIONER

## 2023-04-06 PROCEDURE — 3080F DIAST BP >= 90 MM HG: CPT | Performed by: NURSE PRACTITIONER

## 2023-04-06 PROCEDURE — 99213 OFFICE O/P EST LOW 20 MIN: CPT | Performed by: NURSE PRACTITIONER

## 2023-04-06 RX ORDER — AMLODIPINE BESYLATE 10 MG/1
TABLET ORAL
COMMUNITY
Start: 2023-02-14

## 2023-04-06 RX ORDER — ERYTHROMYCIN 5 MG/G
OINTMENT OPHTHALMIC EVERY 6 HOURS
Qty: 3.5 G | Refills: 0 | Status: SHIPPED | OUTPATIENT
Start: 2023-04-06 | End: 2023-04-11

## 2023-04-06 RX ORDER — ATORVASTATIN CALCIUM 80 MG/1
TABLET, FILM COATED ORAL
COMMUNITY
Start: 2023-04-03

## 2023-04-06 RX ORDER — OLMESARTAN MEDOXOMIL 20 MG/1
TABLET ORAL
COMMUNITY
Start: 2023-02-06

## 2023-04-06 ASSESSMENT — ENCOUNTER SYMPTOMS
NEUROLOGICAL NEGATIVE: 1
EYE DISCHARGE: 0
EYE REDNESS: 1
EYE ITCHING: 0
CONSTITUTIONAL NEGATIVE: 1
EYE PAIN: 1
RESPIRATORY NEGATIVE: 1
PSYCHIATRIC NEGATIVE: 1
PHOTOPHOBIA: 0

## 2023-05-15 ENCOUNTER — LAB ENCOUNTER (OUTPATIENT)
Dept: LAB | Age: 43
End: 2023-05-15
Attending: INTERNAL MEDICINE
Payer: COMMERCIAL

## 2023-05-15 DIAGNOSIS — Z12.5 SCREENING FOR PROSTATE CANCER: ICD-10-CM

## 2023-05-15 DIAGNOSIS — E78.6 LOW HDL (UNDER 40): ICD-10-CM

## 2023-05-15 DIAGNOSIS — E78.2 MIXED HYPERLIPIDEMIA: Primary | ICD-10-CM

## 2023-05-15 DIAGNOSIS — I16.1 HYPERTENSIVE EMERGENCY: ICD-10-CM

## 2023-05-15 DIAGNOSIS — I10 ESSENTIAL HYPERTENSION, BENIGN: ICD-10-CM

## 2023-05-15 DIAGNOSIS — R60.0 BILATERAL LOWER EXTREMITY EDEMA: ICD-10-CM

## 2023-05-15 DIAGNOSIS — I10 ESSENTIAL HYPERTENSION, MALIGNANT: ICD-10-CM

## 2023-05-15 LAB
ALBUMIN SERPL-MCNC: 4.5 G/DL (ref 3.4–5)
ALBUMIN/GLOB SERPL: 1.5 {RATIO} (ref 1–2)
ALP LIVER SERPL-CCNC: 81 U/L
ALT SERPL-CCNC: 77 U/L
ANION GAP SERPL CALC-SCNC: 5 MMOL/L (ref 0–18)
AST SERPL-CCNC: 31 U/L (ref 15–37)
BASOPHILS # BLD AUTO: 0.11 X10(3) UL (ref 0–0.2)
BASOPHILS NFR BLD AUTO: 1.6 %
BILIRUB SERPL-MCNC: 0.8 MG/DL (ref 0.1–2)
BUN BLD-MCNC: 12 MG/DL (ref 7–18)
CALCIUM BLD-MCNC: 9.3 MG/DL (ref 8.5–10.1)
CHLORIDE SERPL-SCNC: 105 MMOL/L (ref 98–112)
CHOLEST SERPL-MCNC: 131 MG/DL (ref ?–200)
CO2 SERPL-SCNC: 29 MMOL/L (ref 21–32)
COMPLEXED PSA SERPL-MCNC: 0.46 NG/ML (ref ?–4)
CREAT BLD-MCNC: 0.96 MG/DL
EOSINOPHIL # BLD AUTO: 0.16 X10(3) UL (ref 0–0.7)
EOSINOPHIL NFR BLD AUTO: 2.3 %
ERYTHROCYTE [DISTWIDTH] IN BLOOD BY AUTOMATED COUNT: 12.7 %
FASTING PATIENT LIPID ANSWER: YES
FASTING STATUS PATIENT QL REPORTED: YES
GFR SERPLBLD BASED ON 1.73 SQ M-ARVRAT: 102 ML/MIN/1.73M2 (ref 60–?)
GLOBULIN PLAS-MCNC: 3.1 G/DL (ref 2.8–4.4)
GLUCOSE BLD-MCNC: 122 MG/DL (ref 70–99)
HCT VFR BLD AUTO: 44.1 %
HDLC SERPL-MCNC: 38 MG/DL (ref 40–59)
HGB BLD-MCNC: 15.3 G/DL
IMM GRANULOCYTES # BLD AUTO: 0.02 X10(3) UL (ref 0–1)
IMM GRANULOCYTES NFR BLD: 0.3 %
LDLC SERPL CALC-MCNC: 72 MG/DL (ref ?–100)
LYMPHOCYTES # BLD AUTO: 1.97 X10(3) UL (ref 1–4)
LYMPHOCYTES NFR BLD AUTO: 28.2 %
MCH RBC QN AUTO: 28.8 PG (ref 26–34)
MCHC RBC AUTO-ENTMCNC: 34.7 G/DL (ref 31–37)
MCV RBC AUTO: 83.1 FL
MONOCYTES # BLD AUTO: 0.56 X10(3) UL (ref 0.1–1)
MONOCYTES NFR BLD AUTO: 8 %
NEUTROPHILS # BLD AUTO: 4.17 X10 (3) UL (ref 1.5–7.7)
NEUTROPHILS # BLD AUTO: 4.17 X10(3) UL (ref 1.5–7.7)
NEUTROPHILS NFR BLD AUTO: 59.6 %
NONHDLC SERPL-MCNC: 93 MG/DL (ref ?–130)
OSMOLALITY SERPL CALC.SUM OF ELEC: 289 MOSM/KG (ref 275–295)
PLATELET # BLD AUTO: 232 10(3)UL (ref 150–450)
POTASSIUM SERPL-SCNC: 3.1 MMOL/L (ref 3.5–5.1)
PROT SERPL-MCNC: 7.6 G/DL (ref 6.4–8.2)
RBC # BLD AUTO: 5.31 X10(6)UL
SODIUM SERPL-SCNC: 139 MMOL/L (ref 136–145)
T4 FREE SERPL-MCNC: 1 NG/DL (ref 0.8–1.7)
TRIGL SERPL-MCNC: 114 MG/DL (ref 30–149)
TSI SER-ACNC: 2.92 MIU/ML (ref 0.36–3.74)
VLDLC SERPL CALC-MCNC: 17 MG/DL (ref 0–30)
WBC # BLD AUTO: 7 X10(3) UL (ref 4–11)

## 2023-05-15 PROCEDURE — 80061 LIPID PANEL: CPT | Performed by: FAMILY MEDICINE

## 2023-05-15 PROCEDURE — 80050 GENERAL HEALTH PANEL: CPT | Performed by: FAMILY MEDICINE

## 2023-05-15 PROCEDURE — 84153 ASSAY OF PSA TOTAL: CPT | Performed by: FAMILY MEDICINE

## 2023-05-15 PROCEDURE — 84439 ASSAY OF FREE THYROXINE: CPT | Performed by: FAMILY MEDICINE

## 2023-07-20 ENCOUNTER — TELEPHONE (OUTPATIENT)
Dept: FAMILY MEDICINE CLINIC | Facility: CLINIC | Age: 43
End: 2023-07-20

## 2023-07-20 ENCOUNTER — OFFICE VISIT (OUTPATIENT)
Dept: FAMILY MEDICINE CLINIC | Facility: CLINIC | Age: 43
End: 2023-07-20
Payer: COMMERCIAL

## 2023-07-20 VITALS
HEIGHT: 70 IN | SYSTOLIC BLOOD PRESSURE: 128 MMHG | DIASTOLIC BLOOD PRESSURE: 70 MMHG | BODY MASS INDEX: 37.45 KG/M2 | HEART RATE: 63 BPM | WEIGHT: 261.63 LBS | OXYGEN SATURATION: 98 %

## 2023-07-20 DIAGNOSIS — R73.03 PREDIABETES: Primary | ICD-10-CM

## 2023-07-20 DIAGNOSIS — N52.9 ERECTILE DYSFUNCTION, UNSPECIFIED ERECTILE DYSFUNCTION TYPE: ICD-10-CM

## 2023-07-20 DIAGNOSIS — I10 ESSENTIAL HYPERTENSION, BENIGN: ICD-10-CM

## 2023-07-20 DIAGNOSIS — E78.6 LOW HDL (UNDER 40): ICD-10-CM

## 2023-07-20 DIAGNOSIS — E66.01 CLASS 2 SEVERE OBESITY DUE TO EXCESS CALORIES WITH SERIOUS COMORBIDITY AND BODY MASS INDEX (BMI) OF 37.0 TO 37.9 IN ADULT: ICD-10-CM

## 2023-07-20 DIAGNOSIS — G47.33 OSA (OBSTRUCTIVE SLEEP APNEA): ICD-10-CM

## 2023-07-20 DIAGNOSIS — R74.8 ELEVATED LIVER ENZYMES: ICD-10-CM

## 2023-07-20 DIAGNOSIS — Z86.73 HISTORY OF CVA (CEREBROVASCULAR ACCIDENT): ICD-10-CM

## 2023-07-20 DIAGNOSIS — R53.82 CHRONIC FATIGUE: ICD-10-CM

## 2023-07-20 DIAGNOSIS — E78.2 MIXED HYPERLIPIDEMIA: ICD-10-CM

## 2023-07-20 DIAGNOSIS — K76.0 FATTY LIVER: ICD-10-CM

## 2023-07-20 PROBLEM — E66.812 CLASS 2 SEVERE OBESITY DUE TO EXCESS CALORIES WITH SERIOUS COMORBIDITY AND BODY MASS INDEX (BMI) OF 37.0 TO 37.9 IN ADULT (HCC): Status: ACTIVE | Noted: 2023-07-20

## 2023-07-20 LAB
CARTRIDGE LOT#: 77 NUMERIC
HEMOGLOBIN A1C: 5.9 % (ref 4.3–5.6)

## 2023-07-20 PROCEDURE — 3078F DIAST BP <80 MM HG: CPT | Performed by: FAMILY MEDICINE

## 2023-07-20 PROCEDURE — 3008F BODY MASS INDEX DOCD: CPT | Performed by: FAMILY MEDICINE

## 2023-07-20 PROCEDURE — 83036 HEMOGLOBIN GLYCOSYLATED A1C: CPT | Performed by: FAMILY MEDICINE

## 2023-07-20 PROCEDURE — 99214 OFFICE O/P EST MOD 30 MIN: CPT | Performed by: FAMILY MEDICINE

## 2023-07-20 PROCEDURE — 3074F SYST BP LT 130 MM HG: CPT | Performed by: FAMILY MEDICINE

## 2023-07-20 NOTE — TELEPHONE ENCOUNTER
Patient called to inform that he spoke with  pharmacy and they are needing PA for Ozempic (0.25 or 0.5 MG/DOSE) 2 MG/1.5ML  Please advise

## 2023-07-24 ENCOUNTER — LAB ENCOUNTER (OUTPATIENT)
Dept: LAB | Age: 43
End: 2023-07-24
Attending: FAMILY MEDICINE
Payer: COMMERCIAL

## 2023-07-24 DIAGNOSIS — N52.9 ERECTILE DYSFUNCTION, UNSPECIFIED ERECTILE DYSFUNCTION TYPE: ICD-10-CM

## 2023-07-24 DIAGNOSIS — R53.82 CHRONIC FATIGUE: ICD-10-CM

## 2023-07-24 LAB — TESTOST SERPL-MCNC: 342.18 NG/DL

## 2023-07-24 PROCEDURE — 84403 ASSAY OF TOTAL TESTOSTERONE: CPT | Performed by: FAMILY MEDICINE

## 2023-08-02 ENCOUNTER — TELEPHONE (OUTPATIENT)
Dept: FAMILY MEDICINE CLINIC | Facility: CLINIC | Age: 43
End: 2023-08-02

## 2023-08-02 NOTE — TELEPHONE ENCOUNTER
Contact the payer to obtain prior authorization. Payer: [employer]   More information: [email or web address]? [email or web address]    View History     Medication Being Authorized     semaglutide 2 MG/3ML Subcutaneous Solution Pen-injector    Start 0.25 mg subcu q. 7 days x 4 weeks, then increase to 0.5 mg subcu q. 7 days    Dispense: 3 mL Refills: 12     Start: 7/20/2023      Class: Normal Diagnoses: Prediabetes, RAPHAEL (obstructive sleep apnea), History of CVA (cerebrovascular accident), Fatty liver     This order has been released to its destination.    To be filled at: [facility], [address] [facility], [phone]      Faxed form to [phone] back that have to call RX benefits for this patien'ts plan at [phone] then spoke to [facility] and she will be faxing over a form to be completed and sent back

## 2023-08-02 NOTE — TELEPHONE ENCOUNTER
Form filled out and faxed back to Rx Benefits. Pending outcome.   Paperwork on [facility] if needed

## 2023-10-26 NOTE — PROGRESS NOTES
Jaja Dong is a 46year old male. HPI:       Obesity:  States he has made lifestyle changes with his diet. occ exercises. Hypertension:  Uncontrolled. Severity is moderately severe, patient currently on 4 agents for hypertension.   P Medications adjusted. Refer to neurology  . Script for nebulizer, and Rolator ordered  . Keep rheumatology apt  . Flu vaccine adminstered. Lab , then rv in 3 months .    hypertension, benign 10/25/2014   • High blood pressure    • High cholesterol    • History of ear infection 2012   • Low HDL (under 40) 11/28/2014   • Mixed hyperlipidemia 11/28/2014   • Sinus infection    • Visual impairment     glasses      Past Surgical normal, normal thought content.         DATA:    Component      Latest Ref Rng & Units 5/12/2021 3/13/2021 3/6/2017   Cholesterol, Total      <200 mg/dL 164 264 (H) 277 (H)   Triglycerides      30 - 149 mg/dL 149 124 209 (H)   HDL Cholesterol      40 - 59 m Oral Tab; Take 1 tablet (10 mg total) by mouth nightly. Dispense: 90 tablet; Refill: 1  - atorvastatin 80 MG Oral Tab; Take 1 tablet (80 mg total) by mouth nightly. Dispense: 90 tablet; Refill: 1  - LIPID PANEL; Future    5.  Class 2 severe obesity due to Hr 60 tablet 1     Sig: Take 1 tablet (500 mg total) by mouth 2 (two) times daily with meals. Imaging & Consults:  OP REFERRAL TO [facility]    Return in about 4 weeks (around 10/29/2021) for Hypertension.

## 2023-11-11 DIAGNOSIS — I10 ESSENTIAL HYPERTENSION, BENIGN: ICD-10-CM

## 2023-11-14 RX ORDER — OLMESARTAN MEDOXOMIL 20 MG/1
20 TABLET ORAL NIGHTLY
Qty: 90 TABLET | Refills: 0 | OUTPATIENT
Start: 2023-11-14

## 2023-11-14 NOTE — TELEPHONE ENCOUNTER
Requested Prescriptions     Pending Prescriptions Disp Refills    OLMESARTAN 20 MG Oral Tab [Pharmacy Med Name: Olmesartan Medoxomil 20 Mg Tab Jamal] 90 tablet 0     Sig: Take 1 tablet by mouth nightly. Last fill was 1/19/23 #90 3 refills    Last OV 7/20/23    No show for 11/6/23 appointment. No FOV    Refill declined at this time. He should have enough as this was a year script.

## 2024-01-23 DIAGNOSIS — I10 ESSENTIAL HYPERTENSION, BENIGN: ICD-10-CM

## 2024-01-23 RX ORDER — HYDROCHLOROTHIAZIDE 50 MG/1
50 TABLET ORAL DAILY
Qty: 30 TABLET | Refills: 0 | Status: SHIPPED | OUTPATIENT
Start: 2024-01-23

## 2024-01-23 RX ORDER — CARVEDILOL 25 MG/1
25 TABLET ORAL 2 TIMES DAILY WITH MEALS
Qty: 60 TABLET | Refills: 0 | Status: SHIPPED | OUTPATIENT
Start: 2024-01-23

## 2024-01-23 NOTE — TELEPHONE ENCOUNTER
Front Office: Please schedule patient for further refills.    Requested Prescriptions     Signed Prescriptions Disp Refills    carvedilol 25 MG Oral Tab 60 tablet 0     Sig: Take 1 tablet by mouth two times daily with meals.     Authorizing Provider: THUY KIM     Ordering User: PATRICIA VERMA    hydroCHLOROthiazide 50 MG Oral Tab 30 tablet 0     Sig: TAKE ONE TABLET BY MOUTH ONE TIME DAILY     Authorizing Provider: THUY KIM     Ordering User: PATRICIA VERMA     Refilled per protocol/OV notes

## 2024-02-06 DIAGNOSIS — I10 ESSENTIAL HYPERTENSION, BENIGN: ICD-10-CM

## 2024-02-07 RX ORDER — OLMESARTAN MEDOXOMIL 20 MG/1
20 TABLET ORAL NIGHTLY
Qty: 90 TABLET | Refills: 0 | Status: SHIPPED | OUTPATIENT
Start: 2024-02-07

## 2024-02-07 NOTE — TELEPHONE ENCOUNTER
Future Appointments   Date Time Provider Department Center   2/21/2024  4:30 PM Eli Granados,  [facility]

## 2024-02-10 DIAGNOSIS — I10 ESSENTIAL HYPERTENSION, BENIGN: ICD-10-CM

## 2024-02-10 DIAGNOSIS — Z86.73 HISTORY OF CVA (CEREBROVASCULAR ACCIDENT): ICD-10-CM

## 2024-02-12 RX ORDER — AMLODIPINE BESYLATE 10 MG/1
10 TABLET ORAL NIGHTLY
Qty: 30 TABLET | Refills: 0 | Status: SHIPPED | OUTPATIENT
Start: 2024-02-12

## 2024-02-21 ENCOUNTER — TELEPHONE (OUTPATIENT)
Dept: FAMILY MEDICINE CLINIC | Facility: CLINIC | Age: 44
End: 2024-02-21

## 2024-02-21 ENCOUNTER — OFFICE VISIT (OUTPATIENT)
Dept: FAMILY MEDICINE CLINIC | Facility: CLINIC | Age: 44
End: 2024-02-21
Payer: COMMERCIAL

## 2024-02-21 VITALS
TEMPERATURE: 98 F | DIASTOLIC BLOOD PRESSURE: 82 MMHG | HEIGHT: 70 IN | RESPIRATION RATE: 18 BRPM | OXYGEN SATURATION: 99 % | SYSTOLIC BLOOD PRESSURE: 138 MMHG | BODY MASS INDEX: 37.22 KG/M2 | WEIGHT: 260 LBS | HEART RATE: 80 BPM

## 2024-02-21 DIAGNOSIS — Z91.199 NONCOMPLIANCE: ICD-10-CM

## 2024-02-21 DIAGNOSIS — R40.20 LOC (LOSS OF CONSCIOUSNESS) (HCC): ICD-10-CM

## 2024-02-21 DIAGNOSIS — K76.0 FATTY LIVER: ICD-10-CM

## 2024-02-21 DIAGNOSIS — Z86.73 HISTORY OF CVA (CEREBROVASCULAR ACCIDENT): ICD-10-CM

## 2024-02-21 DIAGNOSIS — I77.9 ARTERIAL DISEASE (HCC): ICD-10-CM

## 2024-02-21 DIAGNOSIS — I67.83 PRES (POSTERIOR REVERSIBLE ENCEPHALOPATHY SYNDROME): ICD-10-CM

## 2024-02-21 DIAGNOSIS — E78.2 MIXED HYPERLIPIDEMIA: ICD-10-CM

## 2024-02-21 DIAGNOSIS — E78.5 DYSLIPIDEMIA: ICD-10-CM

## 2024-02-21 DIAGNOSIS — Z12.5 SCREENING FOR PROSTATE CANCER: ICD-10-CM

## 2024-02-21 DIAGNOSIS — I10 ESSENTIAL HYPERTENSION, BENIGN: ICD-10-CM

## 2024-02-21 DIAGNOSIS — G47.33 OSA (OBSTRUCTIVE SLEEP APNEA): ICD-10-CM

## 2024-02-21 DIAGNOSIS — G35 MS (MULTIPLE SCLEROSIS) (HCC): ICD-10-CM

## 2024-02-21 DIAGNOSIS — I1A.0 RESISTANT HYPERTENSION: ICD-10-CM

## 2024-02-21 DIAGNOSIS — E78.6 LOW HDL (UNDER 40): ICD-10-CM

## 2024-02-21 DIAGNOSIS — Z00.00 ROUTINE GENERAL MEDICAL EXAMINATION AT A HEALTH CARE FACILITY: Primary | ICD-10-CM

## 2024-02-21 DIAGNOSIS — R74.8 ELEVATED LIVER ENZYMES: ICD-10-CM

## 2024-02-21 DIAGNOSIS — E66.01 CLASS 2 SEVERE OBESITY DUE TO EXCESS CALORIES WITH SERIOUS COMORBIDITY AND BODY MASS INDEX (BMI) OF 37.0 TO 37.9 IN ADULT (HCC): ICD-10-CM

## 2024-02-21 DIAGNOSIS — R73.03 PREDIABETES: ICD-10-CM

## 2024-02-21 DIAGNOSIS — I25.10 CORONARY ARTERY DISEASE INVOLVING NATIVE CORONARY ARTERY OF NATIVE HEART WITHOUT ANGINA PECTORIS: ICD-10-CM

## 2024-02-21 DIAGNOSIS — R93.1 ELEVATED CORONARY ARTERY CALCIUM SCORE: ICD-10-CM

## 2024-02-21 PROBLEM — R63.5 WEIGHT GAIN: Status: RESOLVED | Noted: 2021-10-02 | Resolved: 2024-02-21

## 2024-02-21 PROBLEM — E66.812 CLASS 2 SEVERE OBESITY DUE TO EXCESS CALORIES WITH SERIOUS COMORBIDITY AND BODY MASS INDEX (BMI) OF 38.0 TO 38.9 IN ADULT (HCC): Status: RESOLVED | Noted: 2023-01-19 | Resolved: 2024-02-21

## 2024-02-21 PROBLEM — I16.1 HYPERTENSIVE EMERGENCY: Status: RESOLVED | Noted: 2021-03-12 | Resolved: 2024-02-21

## 2024-02-21 PROCEDURE — 99396 PREV VISIT EST AGE 40-64: CPT | Performed by: FAMILY MEDICINE

## 2024-02-21 PROCEDURE — 99215 OFFICE O/P EST HI 40 MIN: CPT | Performed by: FAMILY MEDICINE

## 2024-02-21 RX ORDER — AMLODIPINE BESYLATE 10 MG/1
10 TABLET ORAL NIGHTLY
Qty: 90 TABLET | Refills: 0 | Status: SHIPPED
Start: 2024-02-21

## 2024-02-21 RX ORDER — OLMESARTAN MEDOXOMIL 40 MG/1
40 TABLET ORAL NIGHTLY
Qty: 30 TABLET | Refills: 1 | Status: SHIPPED | OUTPATIENT
Start: 2024-02-21

## 2024-02-21 RX ORDER — HYDROCHLOROTHIAZIDE 50 MG/1
50 TABLET ORAL DAILY
Qty: 90 TABLET | Refills: 0 | Status: SHIPPED | OUTPATIENT
Start: 2024-02-21

## 2024-02-21 RX ORDER — ATORVASTATIN CALCIUM 80 MG/1
80 TABLET, FILM COATED ORAL NIGHTLY
Qty: 90 TABLET | Refills: 1 | Status: SHIPPED
Start: 2024-02-21

## 2024-02-21 RX ORDER — CARVEDILOL 25 MG/1
25 TABLET ORAL 2 TIMES DAILY WITH MEALS
Qty: 180 TABLET | Refills: 0 | Status: SHIPPED | OUTPATIENT
Start: 2024-02-21

## 2024-02-21 NOTE — PROGRESS NOTES
Tonio Wyatt is a 43 year old male       Chief Complaint   Patient presents with    Well Adult    Hypertension    Hyperlipidemia    Other     History of stroke    Loss Of Consciousness           HPI:       LOC:  Lasting per pt 1-2 seconds.  Most recent episode was 1-3 months ago, chasing his daughter around in the house and started laughing \"really hard\", fell onto bed and then \"popped back up again\".  H/o CVA.  Since stroke still will sometimes have word finding issues.  He has also been diagnosed with MS, patient states he was not aware of this diagnosis.  There is a family h/o MS.      Hypertension:  Stable.   Severity is severe.  Associated with history of CVA.  Patient is on max doses of carvedilol, hydrochlorothiazide, and amlodipine.  He also takes olmesartan 20 mg nightly.  Pt states he has been taking medications as instructed, no medication side effects.      RAPHAEL:  Patient reports he has contacted the sleep specialist office multiple times and has been unable to make an appointment.  I have discussed this with him in the past and have repeatedly given patient contact information to see the sleep specialist and instructed him to call and make an appointment.        Mixed hyperlipidemia with low HDL:  Associated with history of CVA.  Patient taking atorvastatin at max dose, tolerating well, no side effects noted such as muscle aches or pains.       Obesity:  Persistent obesity.        Wt Readings from Last 6 Encounters:   02/21/24 260 lb (117.9 kg)   07/20/23 261 lb 9.6 oz (118.7 kg)   01/19/23 270 lb 6.4 oz (122.7 kg)   11/09/22 274 lb (124.3 kg)   05/25/22 275 lb 3.2 oz (124.8 kg)   10/01/21 263 lb (119.3 kg)     Body mass index is 37.31 kg/m².           Patient Active Problem List   Diagnosis    Essential hypertension, benign    Low testosterone    Mixed hyperlipidemia    Low HDL (under 40)    PRES (posterior reversible encephalopathy syndrome)    Word finding difficulty    Elevated liver  enzymes    Dyslipidemia    Allergic rhinitis    Fatty liver    History of CVA (cerebrovascular accident)    Encounter for weight loss counseling    Prediabetes    Bilateral lower extremity edema    Dry skin    RAPHAEL (obstructive sleep apnea)    Elevated coronary artery calcium score    Venous insufficiency of both lower extremities    Erectile dysfunction    Chronic fatigue    Class 2 severe obesity due to excess calories with serious comorbidity and body mass index (BMI) of 37.0 to 37.9 in adult ([facility])    Resistant hypertension    LOC (loss of consciousness) ([facility])     Current Outpatient Medications   Medication Sig Dispense Refill    Olmesartan Medoxomil 40 MG Oral Tab Take 1 tablet (40 mg total) by mouth at bedtime. 30 tablet 1    amLODIPine 10 MG Oral Tab Take 1 tablet (10 mg total) by mouth nightly. 90 tablet 0    atorvastatin 80 MG Oral Tab Take 1 tablet (80 mg total) by mouth nightly. 90 tablet 1    carvedilol 25 MG Oral Tab Take 1 tablet (25 mg total) by mouth 2 (two) times daily with meals. 180 tablet 0    hydroCHLOROthiazide 50 MG Oral Tab Take 1 tablet (50 mg total) by mouth daily. 90 tablet 0    ASPIRIN 325 MG Oral Tab TAKE ONE TABLET BY MOUTH ONE TIME DAILY  90 tablet 0    Cetirizine HCl (ZYRTEC OR) Take by mouth daily.        Past Medical History:   Diagnosis Date    MALU (acute kidney injury) (Formerly [facility])     Class 2 severe obesity due to excess calories with serious comorbidity and body mass index (BMI) of 37.0 to 37.9 in adult ([facility]) 07/20/2023    Class 2 severe obesity due to excess calories with serious comorbidity and body mass index (BMI) of 38.0 to 38.9 in adult ([facility])     Associate with hypertension, prediabetes, mixed hyperlipidemia, and RAPHAEL    Class 2 severe obesity due to excess calories with serious comorbidity and body mass index (BMI) of 39.0 to 39.9 in adult ([facility]) 05/25/2022    Disorder of liver     fatty liver    Dyslipidemia 03/24/2021    Goal LDL <70     Essential hypertension, benign 10/25/2014     Goal BP <=130/85     Focal infarction of brain (HCC) 2021    History of ear infection 2012    Hypertensive emergency     Low HDL (under 40) 2014    Mixed hyperlipidemia 2014    Goal LDL <70     Resistant hypertension 2024    Goal BP <=130/85     Visual impairment     glasses    Weight gain     17 pound weight gain from 3/24/2021 to 10/1/2021      Past Surgical History:   Procedure Laterality Date    VASECTOMY  14    Dr. Horta      Family History   Problem Relation Age of Onset    Heart Attack Maternal Grandfather     Heart Attack Maternal Grandmother     Hypertension Father     Diabetes Father     Hypertension Mother     Asthma Son       Social History:  Social History     Socioeconomic History    Marital status:    Tobacco Use    Smoking status: Former     Packs/day: 1.5     Types: Cigarettes     Quit date: 2013     Years since quittin.0    Smokeless tobacco: Never    Tobacco comments:     pt smoked for 15 years   Vaping Use    Vaping Use: Former   Substance and Sexual Activity    Alcohol use: Yes     Alcohol/week: 3.0 standard drinks of alcohol     Types: 3 Standard drinks or equivalent per week     Comment: social on weekends    Drug use: No    Sexual activity: Not Currently   Other Topics Concern    Caffeine Concern Yes     Comment: 1 cup coffee    Exercise Yes     Comment: very active at work.     Seat Belt Yes        Occ: sales.  Marital Status: . Children: 3.   Exercise: none.    Diet: watches fats closely and watches sugar closely     REVIEW OF SYSTEMS:   GENERAL: Feels well overall. Denies fever or chills.  SKIN: Denies any new or changing skin lesions.  EYES: Denies changes in vision.  HENT: Denies upper respiratory symptoms.  LUNGS: Denies LINDSAY, wheezing, cough.  Dyspnea on exertion.  CARDIOVASCULAR: Denies CP or palpitations. Denies chest pain on exertion.  GI: Denies abdominal pain, denies heartburn, denies n/v/c/d/change in stools/blood in  stool/black stool/change in appetite  : Denies nocturia or changes in urinary stream. Denies scrotal mass/abnormal discharge from urethra.  MUSCULOSKELETAL: No complaints of joint or muscle aches or pains.  NEURO: Denies headaches/dizziness.  PSYCH: Denies depression or anxiety  HEMATOLOGIC: No complaints of easy bruising/bleeding/anemia/blood clot disorders  ENDOCRINE: No complaints of enlarged neck glands/polyuria/polydypsia.      EXAM:   /82 (BP Location: Left arm, Patient Position: Sitting, Cuff Size: large)   Pulse 80   Temp 98 °F (36.7 °C)   Resp 18   Ht 5' 10\" (1.778 m)   Wt 260 lb (117.9 kg)   SpO2 99%   BMI 37.31 kg/m²   Body mass index is 37.31 kg/m².   GENERAL: NAD. Pleasant, well developed, nonill appearing overweight male  SKIN: No visible rashes.  No visible suspicious lesions.  HENT: NCAT, EACs clear b/l, TMs normal b/l.  Nose: No nasal discharge.  OP: MMM.  Posteriorly no exudate or erythema.  EYES: PERRL.  EOMI.  Conjunctiva non-injected.  Non-icteric.  NECK: Supple. No cervical or supraclavicular adenopathy, no thyromegaly/thyroid nodules appreciated, no masses  LUNGS: CTA A/P, no wheezes/ronchi/rales/crackles, normal air excursion  CARDIOVASCULAR: RRR, no murmur.  No lower extremity edema.  Abdomen: Obese.  Non-tender to palpation, no HSM/masses/pulsations  MUSCULOSKELETAL: Back with normal AROM, no joint swelling  EXTREMITIES: No cyanosis, clubbing, or edema  NEURO: A&O x3.  No gross motor or gross sensory abnormality.  PSYCH: Normal affect. No apparent thought disorder. Average judgement and insight.    Immunization History   Administered Date(s) Administered    >=3 YRS TRI  MULTIDOSE VIAL (50127) FLU CLINIC 10/25/2014    TDAP 10/25/2014   Pended Date(s) Pended    Influenza Vaccine Refused 02/21/2024           DATA:      Interpretation   PROCEDURE:  MRI BRAIN (W+WO) (CPT=70553)     COMPARISON:  [facility] , CT, CTA BRAIN + CTA CAROTIDS (CPT=70496/17581), 3/13/2021, 9:27 AM.   RE, , MRI BRAIN (W+WO) (CPT=70553), 3/12/2021, 2:23 PM.     INDICATIONS:  I67.83 PRES (posterior reversible encephalopathy syndrome)     TECHNIQUE:  MRI of the brain was performed with multi-planar T1, T2-weighted images with FLAIR sequences and diffusion weighted images without and with infusion.     PATIENT STATED HISTORY:(As transcribed by Technologist)  The patient is being treated for posterior reversible encephalopathy syndrome. There are no new or worsening symptoms currently while on medication.      CONTRAST USED:  20 mL of Dotarem     FINDINGS:    On series 2, image 18, there is a punctate focus of restriction abnormality and T2 hyperintense signal within the posterior aspect of the left corona radiata which is new since prior imaging.     There is a new T2 hyperintense lesion within the left vermis.     There are T2 hyperintense lesions with T2 shine through again noted within the left body of the corpus callosum, right parietal lobe, right splenium, and right frontal lobe.  This is not significantly changed since 3/12/2021.  There is a small T2   hyperintense lesion within the left cerebellum, unchanged.  There is a T2 hyperintense lesion within the right basal ganglia, unchanged.  There are additional minimal scattered faint T2 hyperintense lesions within the periventricular and subcortical   white matter.  The lesion within the right aspect of the splenium not demonstrates central linear T2 hypointense signal.  There are T2 hyperintense foci within the jose, unchanged.     On 3/12/2021, the lesion within the left parietal lobe demonstrated avid enhancement.  This enhancement has improved since this previous examination.     The ventricles and sulci are within normal limits.  There is no midline shift or mass effect.  The basal cisterns are patent.       There are a few foci of gradient susceptibility within the jose which may represent remote blood products from microhemorrhage.     There is  no acute intracranial hemorrhage or extra-axial fluid collection identified.       There is a moderate amount of fluid within the left mastoid air cells.  There are prominent retention cysts within the maxillary sinuses, left greater than right.  The expected major intracranial flow voids are present.    =====  CONCLUSION:  When compared to the brain MRI from 3/12/2021, there is a new punctate focus of restriction abnormality and T2 hyperintense signal within the posterior left corona radiata.  There is an additional new small T2 hyperintense lesion within the   left vermis.  These new lesions do not demonstrate enhancement.  The prominent cortical and subcortical lesion within the right parietal lobe demonstrates persistent but improved enhancement.  The T2 hyperintense lesion within the right splenium   demonstrates new intrinsic T2 hypointense signal.  The other T2 hyperintense lesions within the supratentorial and infratentorial compartments are otherwise not significantly changed.  Many of these lesions demonstrate T2 shine through on   diffusion-weighted imaging.  There are punctate foci of T2 hyperintense signal and gradient susceptibility within the jose.  Differential considerations for these lesions would include a demyelinating process such as MS or scattered subacute infarcts of   varying age.  The progression of disease despite treatment would make posterior reversible encephalopathy less likely.  Some of these lesions are perpendicular to the corpus callosum and clinical correlation is recommended to assess for a demyelinating   process.  Continued clinical and imaging follow-up is recommended.     The radiology support staff is in the process of contacting the referring physician regarding this report.          Dictated by (CST): Stromberg, LeRoy, MD on 4/12/2021 at 9:01 AM       Finalized by (CST): Stromberg, LeRoy, MD on 4/12/2021 at 9:37 AM          Diabetes:    Lab Results   Component Value Date     A1C 5.9 (A) 07/20/2023    A1C 6.0 (A) 05/25/2022    A1C 5.6 03/12/2021     03/12/2021     12/19/2014     Lab Results   Component Value Date    CHOLEST 131 05/15/2023    CHOLEST 143 10/27/2022    CHOLEST 125 11/22/2021     Lab Results   Component Value Date    HDL 38 (L) 05/15/2023    HDL 36 (L) 10/27/2022    HDL 34 (L) 11/22/2021     Lab Results   Component Value Date    LDL 72 05/15/2023    LDL 83 10/27/2022    LDL 67 11/22/2021     Lab Results   Component Value Date    TRIG 114 05/15/2023    TRIG 132 10/27/2022    TRIG 133 11/22/2021     Lab Results   Component Value Date    AST 31 05/15/2023    AST 32 10/27/2022    AST 33 11/22/2021     Lab Results   Component Value Date    ALT 77 (H) 05/15/2023    ALT 72 (H) 10/27/2022    ALT 84 (H) 11/22/2021             ASSESSMENT AND PLAN:   Tonio Wyatt is a 43 year old male who presents for a complete physical exam.       Encounter Diagnoses   Name Primary?    Routine general medical examination at a health care facility Yes    Screening for prostate cancer     PRES (posterior reversible encephalopathy syndrome)     History of CVA (cerebrovascular accident)     LOC (loss of consciousness) (HCC)     MS (multiple sclerosis) (HCC)     Resistant hypertension Goal BP <=130/85     Essential hypertension, benign Goal BP <=130/85     Arterial disease (HCC)     Coronary artery disease involving native coronary artery of native heart without angina pectoris     Elevated coronary artery calcium score     Mixed hyperlipidemia Goal LDL <70     Low HDL (under 40)     Dyslipidemia Goal LDL <70     RAPHAEL (obstructive sleep apnea)     Prediabetes     Class 2 severe obesity due to excess calories with serious comorbidity and body mass index (BMI) of 37.0 to 37.9 in adult (HCC)     Elevated liver enzymes     Fatty liver     Noncompliance        Total time 60 minutes precharting, history and physical, plan of care of which 40 minutes was spent addressing the patient's  medical conditions in addition to the wellness visit.        1. Routine general medical examination at a health care facility  Patient provided handout on men's health and prevention.    Routine health profile labs pending.      2. Screening for prostate cancer  PSA due on or shortly after 5/15/2024.  - PSA, Total (Screening) [E]; Future    (Neuro/neurovascular)  3. PRES (posterior reversible encephalopathy syndrome)  4. History of CVA (cerebrovascular accident)  5. LOC (loss of consciousness) ([facility])  6. MS (multiple sclerosis) ([facility])  Patient's last office visit with neurologist Dr. Lloyd was May 2021.  Patient reports last episode of LOC was 1 to 3 months ago and was brief lasting just a few seconds, witnessed by his wife, the preceding activity was running around chasing after his daughter in the house and laughing very hard.  MRI of brain ordered and pending.  Patient advised again to follow-up with neurologist, patient referred to Dr. Green and colleagues for further evaluation and care.    - Neuro Referral - In Network  - MRI BRAIN (W+WO) (CPT=70553); Future    - atorvastatin 80 MG Oral Tab; Take 1 tablet (80 mg total) by mouth nightly.  Dispense: 90 tablet; Refill: 1      (Cardiovascular)  7. Resistant hypertension Goal BP <=130/85  8. Essential hypertension, benign Goal BP <=130/85  9. Arterial disease (HCC)  10. Coronary artery disease involving native coronary artery of native heart without angina pectoris  11. Elevated coronary artery calcium score  12. Mixed hyperlipidemia Goal LDL <70  13. Low HDL (under 40)  14. Dyslipidemia Goal LDL <70    Resistant hypertension, blood pressure not to goal of less than or equal to 130/85.  Patient is on max doses of amlodipine, carvedilol, and hydrochlorothiazide.  Recommend increase olmesartan to 40 mg nightly from 20 mg nightly.  Follow-up with me on hypertension in 6 weeks.    LDL close to goal of less than 70 May 2023.  Recommend continue atorvastatin 80 mg nightly,  reevaluate labs.  Recommend follow-up with Dr. Trevino, patient was due for follow-up office visit with Dr. Trevino November 2023.    - Olmesartan Medoxomil 40 MG Oral Tab; Take 1 tablet (40 mg total) by mouth at bedtime.  Dispense: 30 tablet; Refill: 1  - amLODIPine 10 MG Oral Tab; Take 1 tablet (10 mg total) by mouth nightly.  Dispense: 90 tablet; Refill: 0  - carvedilol 25 MG Oral Tab; Take 1 tablet (25 mg total) by mouth 2 (two) times daily with meals.  Dispense: 180 tablet; Refill: 0  - hydroCHLOROthiazide 50 MG Oral Tab; Take 1 tablet (50 mg total) by mouth daily.  Dispense: 90 tablet; Refill: 0    - atorvastatin 80 MG Oral Tab; Take 1 tablet (80 mg total) by mouth nightly.  Dispense: 90 tablet; Refill: 1    - CBC With Differential With Platelet; Future  - Comp Metabolic Panel (14); Future  - Assay, Thyroid Stim Hormone; Future  - Free T4, (Free Thyroxine); Future  - Lipid Panel; Future    - Cardio Referral - Internal    15. RAPHAEL (obstructive sleep apnea)  This is the fourth time the patient has been referred to the pulmonologist/sleep specialist.  Patient continues to insist that he call multiple times and that there was no answer, patient has been given the contact information again on more than 1 occasion and has not called to make an appointment.    - Pulmonary Referral - In Network    16. Prediabetes  Recommend reevaluate A1c.  - Hemoglobin A1C [E]; Future    17. Class 2 severe obesity due to excess calories with serious comorbidity and body mass index (BMI) of 37.0 to 37.9 in adult (HCC)  Associate with hypertension, mixed hyperlipidemia, and prediabetes.  Recommend healthy diet including green leafy vegetables, fresh fruits and protein.  Aerobic exercise 30 minutes five days a week for cardiovascular fitness and 45-60 minutes 6-7 days a week for weight loss.     18. Elevated liver enzymes  19. Fatty liver  Recommend reevaluate CMP.  Patient did complete elastography 5/12/2021.  Today is the fourth time that  we have referred him to the gastroenterologist.    - Gastro Referral - In Network  - Comp Metabolic Panel (14); Future    20. Noncompliance  Secondary to memory issues?  Patient has not made an appointment to see the sleep specialist as previously discussed on more than 1 occasion, patient continues to insist that he call multiple times and that there was no answer, patient has been given the contact information again on more than 1 occasion and has not called to make an appointment.  Patient has not made a follow-up appoint with the cardiologist Dr. Ewing which was recommended to be a 6-month interval which was due November 2023.  Patient has not made an appointment to see the gastroenterologist as recommended due to fatty liver and elevated liver enzymes.  Patient has not made a follow-up appointment with the neurologist as recommended.            Orders Placed This Encounter   Procedures    PSA, Total (Screening) [E]    CBC With Differential With Platelet    Comp Metabolic Panel (14)    Lipid Panel    Assay, Thyroid Stim Hormone    Free T4, (Free Thyroxine)    Hemoglobin A1C [E]    INFLUENZA REFUSED [facility]       Meds & Refills for this Visit:  Requested Prescriptions     Signed Prescriptions Disp Refills    Olmesartan Medoxomil 40 MG Oral Tab 30 tablet 1     Sig: Take 1 tablet (40 mg total) by mouth at bedtime.    amLODIPine 10 MG Oral Tab 90 tablet 0     Sig: Take 1 tablet (10 mg total) by mouth nightly.    atorvastatin 80 MG Oral Tab 90 tablet 1     Sig: Take 1 tablet (80 mg total) by mouth nightly.    carvedilol 25 MG Oral Tab 180 tablet 0     Sig: Take 1 tablet (25 mg total) by mouth 2 (two) times daily with meals.    hydroCHLOROthiazide 50 MG Oral Tab 90 tablet 0     Sig: Take 1 tablet (50 mg total) by mouth daily.       Imaging & Consults:  INFLUENZA REFUSED [facility]  NEURO - INTERNAL  PULMONARY - INTERNAL  GASTRO - INTERNAL  CARDIO - INTERNAL  MRI BRAIN (W+WO) (CPT=70553)    Return in about 6 weeks (around 4/3/2024)  for Hypertension .

## 2024-02-22 NOTE — TELEPHONE ENCOUNTER
Please call patient and inform him that his chart was further reviewed after his office visit on 2/21/2024 and it is noted that patient was to have followed up with Dr. Ewing, cardiologist, November 2023.  Referral has been placed for tracking purposes.    Please provide patient with Dr. Ewing's contact information below.    Provider Address Phone   José Miguel Ewing MD [address] [place] [id number] [phone]

## 2024-03-02 DIAGNOSIS — I10 ESSENTIAL HYPERTENSION, BENIGN: ICD-10-CM

## 2024-03-04 RX ORDER — CARVEDILOL 25 MG/1
25 TABLET ORAL 2 TIMES DAILY WITH MEALS
Qty: 60 TABLET | Refills: 0 | Status: SHIPPED | OUTPATIENT
Start: 2024-03-04

## 2024-03-04 RX ORDER — HYDROCHLOROTHIAZIDE 50 MG/1
50 TABLET ORAL DAILY
Qty: 30 TABLET | Refills: 0 | Status: SHIPPED
Start: 2024-03-04

## 2024-03-13 DIAGNOSIS — Z86.73 HISTORY OF CVA (CEREBROVASCULAR ACCIDENT): ICD-10-CM

## 2024-03-13 DIAGNOSIS — I10 ESSENTIAL HYPERTENSION, BENIGN: ICD-10-CM

## 2024-03-13 RX ORDER — AMLODIPINE BESYLATE 10 MG/1
10 TABLET ORAL NIGHTLY
Qty: 30 TABLET | Refills: 0 | Status: SHIPPED | OUTPATIENT
Start: 2024-03-13

## 2024-03-27 ENCOUNTER — OFFICE VISIT (OUTPATIENT)
Dept: NEUROLOGY | Facility: CLINIC | Age: 44
End: 2024-03-27
Payer: COMMERCIAL

## 2024-03-27 VITALS
HEART RATE: 74 BPM | DIASTOLIC BLOOD PRESSURE: 94 MMHG | WEIGHT: 261 LBS | OXYGEN SATURATION: 98 % | BODY MASS INDEX: 37.37 KG/M2 | SYSTOLIC BLOOD PRESSURE: 140 MMHG | HEIGHT: 70 IN | RESPIRATION RATE: 16 BRPM

## 2024-03-27 DIAGNOSIS — I63.9 CEREBROVASCULAR ACCIDENT (CVA), UNSPECIFIED MECHANISM (HCC): Primary | ICD-10-CM

## 2024-03-27 DIAGNOSIS — R90.89 ABNORMAL BRAIN MRI: ICD-10-CM

## 2024-03-27 PROCEDURE — 99215 OFFICE O/P EST HI 40 MIN: CPT | Performed by: OTHER

## 2024-03-27 NOTE — PROGRESS NOTES
Neurology Return History & Physical    CHIEF COMPLAINT / REASON FOR VISIT:    Chief Complaint   Patient presents with    New Patient     Establish care re Hx of CVA (3/12/21).  Pt still has short-term memory lapses and at times, searching for words. Referred by PCP, Dr. Granados.       HISTORY OBTAINED FROM:  Patient and others as above  Data review (see below)    HISTORY OF PRESENT ILLNESS:  Tonio Wyatt is a 44 year old male with HTN, HL, RAPHAEL, obesity, pre-DM (A1c 5.9 in July 2023), who was admitted here in March 2021 for vomiting, dizziness, imbalance, uncontrolled HTN.  MRI concerning for PRES vs. Multiple subacute infarcts (vs. MS once he saw neuro in follow up).  Also had left hand incoordination (and the most \"PRES-like\" lesion was left cerebellar).  Symptoms improved with BP control.  He also had further eval as outpatient with neuro (LP negative as below).      He had an event of LOC twice in past years, last one about 2-4 months ago.  Both times, he laughed so hard and he had tunnel vision, passed out, slouched done, and then he recovered immediately without confusion.    No new stroke like symptoms since then.  Has residual word finding difficulty and STM difficulty.  Right hand incoordination previously recovered.  He is still working but needs to keep notes for everything.    DATA REVIEWED:  As documented in the HPI    Feb 2024  PCP note     May-June 2021  CSF negative but OCBs not done   Neuro note ([facility]) was evaluated for dizziness and difficulty with left hand, MRI brain was concerning for MS, CTA h/n unremarkable, recommended LP and ASA + Statin    April 2021  CTA h/n unremarkable  TTE w bubble unremarkable   MRI brain w/wo \"When compared to the brain MRI from 3/12/2021, there is a new punctate focus of restriction abnormality and T2 hyperintense signal within the posterior left corona radiata.  There is an additional new small T2 hyperintense lesion within the left vermis.  These new lesions do  not demonstrate enhancement.  The prominent cortical and subcortical lesion within the right parietal lobe demonstrates persistent but improved enhancement.  The T2 hyperintense lesion within the right splenium   demonstrates new intrinsic T2 hypointense signal.  The other T2 hyperintense lesions within the supratentorial and infratentorial compartments are otherwise not significantly changed.  Many of these lesions demonstrate T2 shine through on diffusion-weighted imaging.  There are punctate foci of T2 hyperintense signal and gradient susceptibility within the jose.  Differential considerations for these lesions would include a demyelinating process such as MS or scattered subacute infarcts of   varying age.  The progression of disease despite treatment would make posterior reversible encephalopathy less likely.  Some of these lesions are perpendicular to the corpus callosum and clinical correlation is recommended to assess for a demyelinating process.  Continued clinical and imaging follow-up is recommended. \"   (I independently analyzed and interpreted this, and I agree with the reading physician's report.)    NEUROLOGICAL FAMILY HISTORY:  Cousin MS  No strokes    PAST MEDICAL HISTORY:  Past Medical History:   Diagnosis Date    MALU (acute kidney injury) (HCC)     Class 2 severe obesity due to excess calories with serious comorbidity and body mass index (BMI) of 37.0 to 37.9 in adult ([facility]) 07/20/2023    Class 2 severe obesity due to excess calories with serious comorbidity and body mass index (BMI) of 38.0 to 38.9 in adult ([facility])     Associate with hypertension, prediabetes, mixed hyperlipidemia, and RAPHAEL    Class 2 severe obesity due to excess calories with serious comorbidity and body mass index (BMI) of 39.0 to 39.9 in adult ([facility]) 05/25/2022    Disorder of liver     fatty liver    Dyslipidemia 03/24/2021    Goal LDL <70     Essential hypertension, benign 10/25/2014    Goal BP <=130/85     Focal infarction of  brain (HCC) 2021    History of ear infection 2012    Hypertensive emergency     Low HDL (under 40) 2014    Mixed hyperlipidemia 2014    Goal LDL <70     Resistant hypertension 2024    Goal BP <=130/85     Visual impairment     glasses    Weight gain     17 pound weight gain from 3/24/2021 to 10/1/2021       PAST SURGICAL HISTORY:  Past Surgical History:   Procedure Laterality Date    VASECTOMY  14    Dr. Horta       SOCIAL HISTORY:  Social History     Socioeconomic History    Marital status:    Tobacco Use    Smoking status: Former     Packs/day: 1.5     Types: Cigarettes     Quit date: 2013     Years since quittin.1    Smokeless tobacco: Never    Tobacco comments:     pt smoked for 15 years   Vaping Use    Vaping Use: Former   Substance and Sexual Activity    Alcohol use: Yes     Alcohol/week: 3.0 standard drinks of alcohol     Types: 3 Standard drinks or equivalent per week     Comment: social on weekends    Drug use: No    Sexual activity: Not Currently   Other Topics Concern    Caffeine Concern Yes     Comment: 1 cup coffee    Exercise Yes     Comment: very active at work.     Seat Belt Yes       ALLERGIES:  No Known Allergies    CURRENT MEDICATIONS:   AMLODIPINE 10 MG Oral Tab Take 1 tablet by mouth nightly. 30 tablet 0    CARVEDILOL 25 MG Oral Tab TAKE ONE TABLET BY MOUTH TWICE A DAY WITH MEALS 60 tablet 0    HYDROCHLOROTHIAZIDE 50 MG Oral Tab TAKE ONE TABLET BY MOUTH ONE TIME DAILY 30 tablet 0    Olmesartan Medoxomil 40 MG Oral Tab Take 1 tablet (40 mg total) by mouth at bedtime. 30 tablet 1    atorvastatin 80 MG Oral Tab Take 1 tablet (80 mg total) by mouth nightly. 90 tablet 1    ASPIRIN 325 MG Oral Tab TAKE ONE TABLET BY MOUTH ONE TIME DAILY  90 tablet 0    Cetirizine HCl (ZYRTEC OR) Take by mouth daily.         REVIEW OF SYSTEMS:  Patient did not have additional neurological, psychiatric, respiratory, cardiovascular, gastrointestinal, or genitourinary  symptoms.    PHYSICAL EXAMINATION:  BP (!) 140/94   Pulse 74   Resp 16   Ht 70\"   Wt 261 lb (118.4 kg)   SpO2 98%   BMI 37.45 kg/m²     Gen: WDWN, in NAD  MSE: AAOx3, nl language, nl attn/conc, nl fund of knowledge  CN: II - PERRL, VFF; Ill, IV, VI - EOMI, no nystagmus; V - nl facial sensation bilaterally; VII - nl facial mvmt bilaterally; VIII - nl hearing bilaterally; IX - nl palate elevation bilaterally; X - nl voice; XI - nl shoulder shrug b/I; XII - nl tongue movement  Motor: 5/5 x4; no drift; normal tone; no abnormal movements  Sensory: nl vibration x4  Coord: nl FTN b/I  Reflex: 2+ bilaterally in upper and lower extremities  Gait: normal gait         ASSESSMENT & PLAN:      ICD-10-CM    1. Cerebrovascular accident (CVA), unspecified mechanism (HCC)  I63.9 [facility] Mobile Cardiac Telemetry     Anticardiolipin AB, IgG/M, QN     Factor II (Prothrombin) DNA analysis     Factor V Leiden Mutation     Lupus Anticoagulant/Antiphospholipid Panel     Protein C Deficiency Profile     Protein S Activity (Functional)      2. Abnormal brain MRI  R90.89         He had multifocal lesions on brain MRI in 2021, that persisted from March to April MRI's with new non-enhancing and persistent enhancing lesions on the April scan.   He most likely had multifocal embolic strokes (possibly PRES, less likely demyelinating disease in 2021.  To me, some lesions seem to involve the grey matter, but many are juxta cortical, ovoid, or jose/intracallosal.  He does have very longstanding HTN, former smoker, as well as uncontrolled RAPHAEL, so this may end up being traditional risk factors as cause of stroke.    Stroke evaluation was negative, but I do not see a heart monitor or hypercoag workup, will do those now.  MRI brain is pending, will await that to see if there are any changes suggestive of further strokes or demyelinating disease (depending on results, could consider spine imaging, repeat LP, or repeat vascular imaging).  Continue ASA,  statin, BP meds (and aggressive vascular risk factor mgmt per PCP).  He is planning to see sleep specialist soon (AHI 27 in 2021).  He is working on diet and exercise too.    We discussed symptoms that would warrant urgent/emergent evaluation. Patient verbalized understanding and agreement.    Return in about 2 months (around 5/27/2024).       To summarize medical decision making:  Total time I spent caring for the patient was 40 minutes on the day of the encounter related to this visit, including chart review and documentation before and after the visit, including time spent during the visit, but not including separately reported activities or procedures.    Leobardo Jurado MD, FAES, FAAN  Board-Certified in Neurology, Epilepsy, and Clinical Neurophysiology  [facility]

## 2024-03-27 NOTE — PATIENT INSTRUCTIONS
Refill policies:    Allow 2-3 business days for refills; controlled substances may take longer.  Contact your pharmacy at least 5 days prior to running out of medication and have them send an electronic request or submit request through the “request refill” option in your [employer] account.  Refills are not addressed on weekends; covering physicians do not authorize routine medications on weekends.  No narcotics or controlled substances are refilled after noon on Fridays or by on call physicians.  By law, narcotics must be electronically prescribed.  A 30 day supply with no refills is the maximum allowed.  If your prescription is due for a refill, you may be due for a follow up appointment.  To best provide you care, patients receiving routine medications need to be seen at least once a year.  Patients receiving narcotic/controlled substance medications need to be seen at least once every 3 months.  In the event that your preferred pharmacy does not have the requested medication in stock (e.g. Backordered), it is your responsibility to find another pharmacy that has the requested medication available.  We will gladly send a new prescription to that pharmacy at your request.    Scheduling Tests:    If your physician has ordered radiology tests such as MRI or CT scans, please contact Central Scheduling at [phone] right away to schedule the test.  Once scheduled, the [facility] Centralized Referral Team will work with your insurance carrier to obtain pre-certification or prior authorization.  Depending on your insurance carrier, approval may take 3-10 days.  It is highly recommended patients assure they have received an authorization before having a test performed.  If test is done without insurance authorization, patient may be responsible for the entire amount billed.      Precertification and Prior Authorizations:  If your physician has recommended that you have a procedure or additional testing performed the [facility]  Centralized Referral Team will contact your insurance carrier to obtain pre-certification or prior authorization.    You are strongly encouraged to contact your insurance carrier to verify that your procedure/test has been approved and is a COVERED benefit.  Although the [facility] Centralized Referral Team does its due diligence, the insurance carrier gives the disclaimer that \"Although the procedure is authorized, this does not guarantee payment.\"    Ultimately the patient is responsible for payment.   Thank you for your understanding in this matter.  Paperwork Completion:  If you require FMLA or disability paperwork for your recovery, please make sure to either drop it off or have it faxed to our office at [phone]. Be sure the form has your name and date of birth on it.  The form will be faxed to our Forms Department and they will complete it for you.  There is a 25$ fee for all forms that need to be filled out.  Please be aware there is a 10-14 day turnaround time.  You will need to sign a release of information (DERICK) form if your paperwork does not come with one.  You may call the Forms Department with any questions at [phone].  Their fax number is [phone].

## 2024-04-03 DIAGNOSIS — E78.2 MIXED HYPERLIPIDEMIA: ICD-10-CM

## 2024-04-03 DIAGNOSIS — E78.6 LOW HDL (UNDER 40): ICD-10-CM

## 2024-04-03 DIAGNOSIS — Z86.73 HISTORY OF CVA (CEREBROVASCULAR ACCIDENT): ICD-10-CM

## 2024-04-04 RX ORDER — ATORVASTATIN CALCIUM 80 MG/1
80 TABLET, FILM COATED ORAL NIGHTLY
Qty: 90 TABLET | Refills: 0 | Status: SHIPPED | OUTPATIENT
Start: 2024-04-04

## 2024-04-04 NOTE — TELEPHONE ENCOUNTER
Requested Prescriptions     Signed Prescriptions Disp Refills    ATORVASTATIN 80 MG Oral Tab 90 tablet 0     Sig: Take 1 tablet by mouth nightly.     Authorizing Provider: THUY KIM     Ordering User: PATRICIA VERMA     Refilled per protocol/OV notes

## 2024-04-10 ENCOUNTER — TELEPHONE (OUTPATIENT)
Dept: FAMILY MEDICINE CLINIC | Facility: CLINIC | Age: 44
End: 2024-04-10

## 2024-04-11 DIAGNOSIS — I10 ESSENTIAL HYPERTENSION, BENIGN: ICD-10-CM

## 2024-04-11 DIAGNOSIS — Z86.73 HISTORY OF CVA (CEREBROVASCULAR ACCIDENT): ICD-10-CM

## 2024-04-11 RX ORDER — AMLODIPINE BESYLATE 10 MG/1
10 TABLET ORAL NIGHTLY
Qty: 30 TABLET | Refills: 0 | Status: SHIPPED | OUTPATIENT
Start: 2024-04-11

## 2024-04-11 NOTE — TELEPHONE ENCOUNTER
Tonio Wyatt was scheduled for an appt on 4/10/2024 with a visit reason of Return in about 6 weeks (around 4/3/2024) for Hypertension . .    Letter/MyChart message sent to pt notifying of missed appointment with $40 no show fee.

## 2024-04-23 ENCOUNTER — TELEPHONE (OUTPATIENT)
Dept: FAMILY MEDICINE CLINIC | Facility: CLINIC | Age: 44
End: 2024-04-23

## 2024-04-23 DIAGNOSIS — I10 ESSENTIAL HYPERTENSION, BENIGN: ICD-10-CM

## 2024-04-23 RX ORDER — HYDROCHLOROTHIAZIDE 50 MG/1
50 TABLET ORAL DAILY
Qty: 30 TABLET | Refills: 0 | Status: SHIPPED | OUTPATIENT
Start: 2024-04-23 | End: 2024-04-23

## 2024-04-23 RX ORDER — HYDROCHLOROTHIAZIDE 50 MG/1
50 TABLET ORAL DAILY
Qty: 30 TABLET | Refills: 1 | Status: SHIPPED | OUTPATIENT
Start: 2024-04-23

## 2024-04-23 NOTE — TELEPHONE ENCOUNTER
Future Appointments   Date Time Provider Department Center   6/3/2024  5:30 PM Eli Granados, DO [facility] [place]     Patient needed an appointment after 5pm as he is working until then and having difficulty with staffing. He is asking if he could get enough refills until his appointment   Please advise.

## 2024-04-23 NOTE — TELEPHONE ENCOUNTER
Front Office: Patient overdue for follow-up. Please schedule patient for OV before further refills will be given.    Requested Prescriptions     Signed Prescriptions Disp Refills    HYDROCHLOROTHIAZIDE 50 MG Oral Tab 30 tablet 0     Sig: TAKE ONE TABLET BY MOUTH ONE TIME DAILY     Authorizing Provider: THUY KIM     Ordering User: PATRICIA VERMA     Refilled per protocol/OV notes

## 2024-04-24 ENCOUNTER — MED REC SCAN ONLY (OUTPATIENT)
Dept: FAMILY MEDICINE CLINIC | Facility: CLINIC | Age: 44
End: 2024-04-24

## 2024-04-24 ENCOUNTER — TELEPHONE (OUTPATIENT)
Dept: FAMILY MEDICINE CLINIC | Facility: CLINIC | Age: 44
End: 2024-04-24

## 2024-04-24 NOTE — TELEPHONE ENCOUNTER
Received fax on 4/19/2024 requesting medical records regarding [id number] MRI Brain Stem W/O W/Contrast. Office visit 2/21/24 faxed to Crystal Baltazar @ [email or web address] [place] at [phone].      Advised by RN to fax office visit 2/21/24.    Records faxed. Closing encounter.

## 2024-05-09 DIAGNOSIS — I10 ESSENTIAL HYPERTENSION, BENIGN: ICD-10-CM

## 2024-05-09 RX ORDER — OLMESARTAN MEDOXOMIL 40 MG/1
40 TABLET ORAL NIGHTLY
Qty: 30 TABLET | Refills: 1 | Status: SHIPPED | OUTPATIENT
Start: 2024-05-09

## 2024-05-09 NOTE — TELEPHONE ENCOUNTER
Requested Prescriptions     Signed Prescriptions Disp Refills    Olmesartan Medoxomil 40 MG Oral Tab 30 tablet 1     Sig: Take 1 tablet (40 mg total) by mouth at bedtime.     Authorizing Provider: THUY KIM     Ordering User: PATRICIA VERMA     Refilled per protocol/OV notes  Next OV 6/3/24

## 2024-05-09 NOTE — TELEPHONE ENCOUNTER
Tonio Wyatt requesting medication refill for Olmesartan Medoxomil 40 MG Oral .    LOV: 4/10/2024   Last Refill Date: 2/21/2024  30 or 90 Day Supply: 30  Pharmacy Location: [id number] [address] [phone], [phone] [[id number]]   Prescribed By:   Future Appointments   Date Time Provider Department Center          6/3/2024  5:30 PM Eli Granados,  [facility] [place]     Patient is completely out of medication. Called requesting status, aware we did not receive refill request from pharmacy. .   Informed patient to allow up to 24 to 48 Business hours for a call back from a nurse.

## 2024-05-14 ENCOUNTER — TELEPHONE (OUTPATIENT)
Dept: FAMILY MEDICINE CLINIC | Facility: CLINIC | Age: 44
End: 2024-05-14

## 2024-05-14 ENCOUNTER — MED REC SCAN ONLY (OUTPATIENT)
Dept: FAMILY MEDICINE CLINIC | Facility: CLINIC | Age: 44
End: 2024-05-14

## 2024-05-14 NOTE — TELEPHONE ENCOUNTER
Echocardiogram DOS 4/12/2024    Final Impressions:    The study quality is good.  2.   The left ventricle is normal in size, wall thickness, and global left ventricular systolic function.  Left ventricular diastolic function is normal. The left ventricular ejection fraction is 55-60%. No regional wall motion abnormality is noted.  3.  The right ventricle is normal in size.  Right ventricular systolic function is normal.  4.  No significant regurgitation or stenosis is noted.

## 2024-05-17 DIAGNOSIS — Z86.73 HISTORY OF CVA (CEREBROVASCULAR ACCIDENT): ICD-10-CM

## 2024-05-17 DIAGNOSIS — I10 ESSENTIAL HYPERTENSION, BENIGN: ICD-10-CM

## 2024-05-20 RX ORDER — AMLODIPINE BESYLATE 10 MG/1
10 TABLET ORAL NIGHTLY
Qty: 30 TABLET | Refills: 0 | Status: SHIPPED | OUTPATIENT
Start: 2024-05-20

## 2024-05-31 DIAGNOSIS — Z86.73 HISTORY OF CVA (CEREBROVASCULAR ACCIDENT): ICD-10-CM

## 2024-05-31 DIAGNOSIS — E78.2 MIXED HYPERLIPIDEMIA: ICD-10-CM

## 2024-05-31 DIAGNOSIS — E78.6 LOW HDL (UNDER 40): ICD-10-CM

## 2024-05-31 DIAGNOSIS — I10 ESSENTIAL HYPERTENSION, BENIGN: ICD-10-CM

## 2024-05-31 RX ORDER — ATORVASTATIN CALCIUM 80 MG/1
80 TABLET, FILM COATED ORAL NIGHTLY
Qty: 90 TABLET | Refills: 0 | OUTPATIENT
Start: 2024-05-31

## 2024-05-31 RX ORDER — OLMESARTAN MEDOXOMIL 40 MG/1
40 TABLET ORAL NIGHTLY
Qty: 30 TABLET | Refills: 1 | OUTPATIENT
Start: 2024-05-31

## 2024-05-31 RX ORDER — HYDROCHLOROTHIAZIDE 50 MG/1
50 TABLET ORAL DAILY
Qty: 30 TABLET | Refills: 1 | OUTPATIENT
Start: 2024-05-31

## 2024-05-31 RX ORDER — CARVEDILOL 25 MG/1
25 TABLET ORAL 2 TIMES DAILY WITH MEALS
Qty: 60 TABLET | Refills: 0 | Status: SHIPPED | OUTPATIENT
Start: 2024-05-31

## 2024-05-31 NOTE — TELEPHONE ENCOUNTER
Tonio Wyatt requesting medication refill for hydroCHLOROthiazide 50 MG (totally out of), Olmesartan Medoxomil 40 MG only 5 days left  AMLODIPINE 10 MG Oral Tab   ATORVASTATIN 80 MG Oral Tab   CARVEDILOL 25 MG Oral Tab     LOV: 4/10/2024   Last Refill Date:   30 or 90 Day Supply:  Pharmacy Location: [facility] on file  Prescribed By: Dr. Eli Granados     Future Appointments   Date Time Provider Department Center   6/26/2024  9:00 AM Eli Granados, DO [facility] [place]       Informed patient to allow up to 24 to 48 Business hours for a call back from a nurse.

## 2024-06-10 ENCOUNTER — TELEPHONE (OUTPATIENT)
Dept: FAMILY MEDICINE CLINIC | Facility: CLINIC | Age: 44
End: 2024-06-10

## 2024-06-10 NOTE — TELEPHONE ENCOUNTER
I called to tell Mil that he needs to fill out a Authorization to Use and Disclose Health Information form. I asked Mil if he is working with [employer] and he said yes. He said he will come into office to fill out form cause he prefers that over online. Form placed in  forms accordian folder.

## 2024-06-11 DIAGNOSIS — I10 ESSENTIAL HYPERTENSION, BENIGN: ICD-10-CM

## 2024-06-11 RX ORDER — CARVEDILOL 25 MG/1
25 TABLET ORAL 2 TIMES DAILY WITH MEALS
Qty: 180 TABLET | Refills: 0 | OUTPATIENT
Start: 2024-06-11

## 2024-06-11 NOTE — TELEPHONE ENCOUNTER
Signed last week. Duplicate request.    Requested Prescriptions     Refused Prescriptions Disp Refills    CARVEDILOL 25 MG Oral Tab [Pharmacy Med Name: Carvedilol 25 Mg Tab Zydu] 180 tablet 0     Sig: TAKE ONE TABLET BY MOUTH TWICE DAILY WITH MEALS     Refused By: PATRICIA VERMA     Reason for Refusal: Duplicate refill request

## 2024-06-20 ENCOUNTER — TELEPHONE (OUTPATIENT)
Dept: FAMILY MEDICINE CLINIC | Facility: CLINIC | Age: 44
End: 2024-06-20

## 2024-06-20 DIAGNOSIS — Z86.73 HISTORY OF CVA (CEREBROVASCULAR ACCIDENT): ICD-10-CM

## 2024-06-20 DIAGNOSIS — I10 ESSENTIAL HYPERTENSION, BENIGN: ICD-10-CM

## 2024-06-20 RX ORDER — AMLODIPINE BESYLATE 10 MG/1
10 TABLET ORAL NIGHTLY
Qty: 30 TABLET | Refills: 0 | Status: SHIPPED | OUTPATIENT
Start: 2024-06-20

## 2024-06-20 NOTE — TELEPHONE ENCOUNTER
Tonio Wyatt requesting medication refill for AMLODIPINE 10 MG Oral Tab .    LOV: 4/10/2024   Last Refill Date: 5/17/24  Pharmacy Location: [id number] [address] [phone], [phone] [[id number]]   Prescribed By: Dr. Eli Granados     Patient doesn't have enough medication to make it to his upcoming appointment.    Please review and advise.

## 2024-06-21 ENCOUNTER — LAB ENCOUNTER (OUTPATIENT)
Dept: LAB | Age: 44
End: 2024-06-21
Attending: FAMILY MEDICINE

## 2024-06-21 DIAGNOSIS — R74.8 ELEVATED LIVER ENZYMES: ICD-10-CM

## 2024-06-21 DIAGNOSIS — K75.81 NASH (NONALCOHOLIC STEATOHEPATITIS): ICD-10-CM

## 2024-06-21 DIAGNOSIS — E78.2 MIXED HYPERLIPIDEMIA: ICD-10-CM

## 2024-06-21 DIAGNOSIS — I10 ESSENTIAL HYPERTENSION, BENIGN: ICD-10-CM

## 2024-06-21 DIAGNOSIS — E78.5 DYSLIPIDEMIA: ICD-10-CM

## 2024-06-21 DIAGNOSIS — I63.9 CEREBROVASCULAR ACCIDENT (CVA), UNSPECIFIED MECHANISM (HCC): ICD-10-CM

## 2024-06-21 DIAGNOSIS — R73.03 PREDIABETES: ICD-10-CM

## 2024-06-21 DIAGNOSIS — R79.89 ELEVATED LFTS: ICD-10-CM

## 2024-06-21 DIAGNOSIS — Z12.5 SCREENING FOR PROSTATE CANCER: ICD-10-CM

## 2024-06-21 DIAGNOSIS — K76.0 FATTY LIVER: ICD-10-CM

## 2024-06-21 DIAGNOSIS — E78.6 LOW HDL (UNDER 40): ICD-10-CM

## 2024-06-21 LAB
ALBUMIN SERPL-MCNC: 4.8 G/DL (ref 3.2–4.8)
ALBUMIN/GLOB SERPL: 1.9 {RATIO} (ref 1–2)
ALP LIVER SERPL-CCNC: 70 U/L
ALT SERPL-CCNC: 53 U/L
ANION GAP SERPL CALC-SCNC: 8 MMOL/L (ref 0–18)
AST SERPL-CCNC: 30 U/L (ref ?–34)
BASOPHILS # BLD AUTO: 0.08 X10(3) UL (ref 0–0.2)
BASOPHILS NFR BLD AUTO: 1.2 %
BILIRUB SERPL-MCNC: 0.9 MG/DL (ref 0.3–1.2)
BUN BLD-MCNC: 13 MG/DL (ref 9–23)
BUN/CREAT SERPL: 13.5 (ref 10–20)
CALCIUM BLD-MCNC: 9.3 MG/DL (ref 8.7–10.4)
CHLORIDE SERPL-SCNC: 108 MMOL/L (ref 98–112)
CHOLEST SERPL-MCNC: 137 MG/DL (ref ?–200)
CO2 SERPL-SCNC: 27 MMOL/L (ref 21–32)
COMPLEXED PSA SERPL-MCNC: 0.41 NG/ML (ref ?–4)
CREAT BLD-MCNC: 0.96 MG/DL
DEPRECATED HBV CORE AB SER IA-ACNC: 137.4 NG/ML
DEPRECATED RDW RBC AUTO: 37.6 FL (ref 35.1–46.3)
EGFRCR SERPLBLD CKD-EPI 2021: 101 ML/MIN/1.73M2 (ref 60–?)
EOSINOPHIL # BLD AUTO: 0.12 X10(3) UL (ref 0–0.7)
EOSINOPHIL NFR BLD AUTO: 1.8 %
ERYTHROCYTE [DISTWIDTH] IN BLOOD BY AUTOMATED COUNT: 12.6 % (ref 11–15)
EST. AVERAGE GLUCOSE BLD GHB EST-MCNC: 114 MG/DL (ref 68–126)
F2 C.20210G>A GENO BLD/T: NORMAL
F5 P.R506Q BLD/T QL: NORMAL
FASTING PATIENT LIPID ANSWER: YES
FASTING STATUS PATIENT QL REPORTED: YES
GLOBULIN PLAS-MCNC: 2.5 G/DL (ref 2–3.5)
GLUCOSE BLD-MCNC: 114 MG/DL (ref 70–99)
HAV AB SER QL IA: REACTIVE
HAV IGM SER QL: NONREACTIVE
HBA1C MFR BLD: 5.6 % (ref ?–5.7)
HBV CORE AB SERPL QL IA: NONREACTIVE
HBV SURFACE AB SER QL: REACTIVE
HBV SURFACE AB SERPL IA-ACNC: 172.81 MIU/ML
HBV SURFACE AG SER-ACNC: 0.19 [IU]/L
HBV SURFACE AG SERPL QL IA: NONREACTIVE
HCT VFR BLD AUTO: 43.5 %
HCV AB SERPL QL IA: NONREACTIVE
HDLC SERPL-MCNC: 34 MG/DL (ref 40–59)
HGB BLD-MCNC: 15.1 G/DL
IMM GRANULOCYTES # BLD AUTO: 0.02 X10(3) UL (ref 0–1)
IMM GRANULOCYTES NFR BLD: 0.3 %
IRON SATN MFR SERPL: 28 %
IRON SERPL-MCNC: 95 UG/DL
LDLC SERPL CALC-MCNC: 86 MG/DL (ref ?–100)
LYMPHOCYTES # BLD AUTO: 1.65 X10(3) UL (ref 1–4)
LYMPHOCYTES NFR BLD AUTO: 24.8 %
MCH RBC QN AUTO: 28.7 PG (ref 26–34)
MCHC RBC AUTO-ENTMCNC: 34.7 G/DL (ref 31–37)
MCV RBC AUTO: 82.7 FL
MONOCYTES # BLD AUTO: 0.57 X10(3) UL (ref 0.1–1)
MONOCYTES NFR BLD AUTO: 8.6 %
NEUTROPHILS # BLD AUTO: 4.21 X10 (3) UL (ref 1.5–7.7)
NEUTROPHILS # BLD AUTO: 4.21 X10(3) UL (ref 1.5–7.7)
NEUTROPHILS NFR BLD AUTO: 63.3 %
NONHDLC SERPL-MCNC: 103 MG/DL (ref ?–130)
OSMOLALITY SERPL CALC.SUM OF ELEC: 297 MOSM/KG (ref 275–295)
PLATELET # BLD AUTO: 203 10(3)UL (ref 150–450)
POTASSIUM SERPL-SCNC: 3.6 MMOL/L (ref 3.5–5.1)
PROT SERPL-MCNC: 7.3 G/DL (ref 5.7–8.2)
RBC # BLD AUTO: 5.26 X10(6)UL
SODIUM SERPL-SCNC: 143 MMOL/L (ref 136–145)
T4 FREE SERPL-MCNC: 1.3 NG/DL (ref 0.8–1.7)
TIBC SERPL-MCNC: 344 UG/DL (ref 250–425)
TRANSFERRIN SERPL-MCNC: 231 MG/DL (ref 215–365)
TRIGL SERPL-MCNC: 86 MG/DL (ref 30–149)
TSI SER-ACNC: 1.19 MIU/ML (ref 0.55–4.78)
VLDLC SERPL CALC-MCNC: 14 MG/DL (ref 0–30)
WBC # BLD AUTO: 6.7 X10(3) UL (ref 4–11)

## 2024-06-21 PROCEDURE — 85613 RUSSELL VIPER VENOM DILUTED: CPT

## 2024-06-21 PROCEDURE — 84439 ASSAY OF FREE THYROXINE: CPT

## 2024-06-21 PROCEDURE — 85303 CLOT INHIBIT PROT C ACTIVITY: CPT

## 2024-06-21 PROCEDURE — 36415 COLL VENOUS BLD VENIPUNCTURE: CPT

## 2024-06-21 PROCEDURE — 81241 F5 GENE: CPT

## 2024-06-21 PROCEDURE — 85025 COMPLETE CBC W/AUTO DIFF WBC: CPT

## 2024-06-21 PROCEDURE — 86146 BETA-2 GLYCOPROTEIN ANTIBODY: CPT

## 2024-06-21 PROCEDURE — 86706 HEP B SURFACE ANTIBODY: CPT

## 2024-06-21 PROCEDURE — 86708 HEPATITIS A ANTIBODY: CPT

## 2024-06-21 PROCEDURE — 85610 PROTHROMBIN TIME: CPT

## 2024-06-21 PROCEDURE — 85598 HEXAGNAL PHOSPH PLTLT NEUTRL: CPT

## 2024-06-21 PROCEDURE — 86709 HEPATITIS A IGM ANTIBODY: CPT

## 2024-06-21 PROCEDURE — 83540 ASSAY OF IRON: CPT

## 2024-06-21 PROCEDURE — 86704 HEP B CORE ANTIBODY TOTAL: CPT

## 2024-06-21 PROCEDURE — 80061 LIPID PANEL: CPT

## 2024-06-21 PROCEDURE — 84466 ASSAY OF TRANSFERRIN: CPT

## 2024-06-21 PROCEDURE — 86147 CARDIOLIPIN ANTIBODY EA IG: CPT

## 2024-06-21 PROCEDURE — 86803 HEPATITIS C AB TEST: CPT

## 2024-06-21 PROCEDURE — 85730 THROMBOPLASTIN TIME PARTIAL: CPT

## 2024-06-21 PROCEDURE — 83036 HEMOGLOBIN GLYCOSYLATED A1C: CPT

## 2024-06-21 PROCEDURE — 84443 ASSAY THYROID STIM HORMONE: CPT

## 2024-06-21 PROCEDURE — 85390 FIBRINOLYSINS SCREEN I&R: CPT

## 2024-06-21 PROCEDURE — 82728 ASSAY OF FERRITIN: CPT

## 2024-06-21 PROCEDURE — 87340 HEPATITIS B SURFACE AG IA: CPT

## 2024-06-21 PROCEDURE — 85306 CLOT INHIBIT PROT S FREE: CPT

## 2024-06-21 PROCEDURE — 80053 COMPREHEN METABOLIC PANEL: CPT

## 2024-06-21 PROCEDURE — 81240 F2 GENE: CPT

## 2024-06-22 LAB — PROTEIN C FUNCTIONAL: 101 %

## 2024-06-23 NOTE — PROGRESS NOTES
Rich,    Testing for alternative causes of chronic liver disease all returned normal.  Keep appt in 8/2024 for follow-up, as planned.  Please call with any questions,    Mike Sesay MD

## 2024-06-24 ENCOUNTER — LAB ENCOUNTER (OUTPATIENT)
Dept: LAB | Age: 44
End: 2024-06-24
Attending: FAMILY MEDICINE

## 2024-06-24 DIAGNOSIS — K76.0 FATTY LIVER: ICD-10-CM

## 2024-06-24 DIAGNOSIS — I10 ESSENTIAL HYPERTENSION, BENIGN: ICD-10-CM

## 2024-06-24 DIAGNOSIS — K75.81 NASH (NONALCOHOLIC STEATOHEPATITIS): ICD-10-CM

## 2024-06-24 DIAGNOSIS — E78.2 MIXED HYPERLIPIDEMIA: ICD-10-CM

## 2024-06-24 DIAGNOSIS — R74.8 ELEVATED LIVER ENZYMES: ICD-10-CM

## 2024-06-24 DIAGNOSIS — Z12.5 SCREENING FOR PROSTATE CANCER: ICD-10-CM

## 2024-06-24 DIAGNOSIS — E78.6 LOW HDL (UNDER 40): ICD-10-CM

## 2024-06-24 DIAGNOSIS — E78.5 DYSLIPIDEMIA: ICD-10-CM

## 2024-06-24 DIAGNOSIS — R73.03 PREDIABETES: ICD-10-CM

## 2024-06-24 DIAGNOSIS — I63.9 CEREBROVASCULAR ACCIDENT (CVA), UNSPECIFIED MECHANISM (HCC): ICD-10-CM

## 2024-06-24 DIAGNOSIS — R79.89 ELEVATED LFTS: ICD-10-CM

## 2024-06-24 LAB
B2 GLYCOPROT1 IGG SERPL IA-ACNC: 0.9 U/ML (ref ?–7)
B2 GLYCOPROT1 IGM SERPL IA-ACNC: 3.9 U/ML (ref ?–7)
CARDIOLIPIN IGG SERPL-ACNC: 1.1 GPL (ref ?–10)
CARDIOLIPIN IGM SERPL-ACNC: 1.2 MPL (ref ?–10)

## 2024-06-24 PROCEDURE — 85598 HEXAGNAL PHOSPH PLTLT NEUTRL: CPT | Performed by: FAMILY MEDICINE

## 2024-06-24 PROCEDURE — 85390 FIBRINOLYSINS SCREEN I&R: CPT | Performed by: FAMILY MEDICINE

## 2024-06-24 PROCEDURE — 85613 RUSSELL VIPER VENOM DILUTED: CPT | Performed by: FAMILY MEDICINE

## 2024-06-24 PROCEDURE — 85730 THROMBOPLASTIN TIME PARTIAL: CPT | Performed by: FAMILY MEDICINE

## 2024-06-24 PROCEDURE — 85610 PROTHROMBIN TIME: CPT | Performed by: FAMILY MEDICINE

## 2024-06-24 PROCEDURE — 86147 CARDIOLIPIN ANTIBODY EA IG: CPT | Performed by: FAMILY MEDICINE

## 2024-06-24 PROCEDURE — 86146 BETA-2 GLYCOPROTEIN ANTIBODY: CPT | Performed by: FAMILY MEDICINE

## 2024-06-25 LAB
APTT PPP: 27.9 SECONDS (ref 23–36)
CONFIRM APTT STACLOT: NEGATIVE
CONFIRM DRVVT: 1 S (ref 0–1.1)
PROTHROMBIN TIME: 13.6 SECONDS (ref 11.6–14.8)

## 2024-06-26 ENCOUNTER — OFFICE VISIT (OUTPATIENT)
Dept: FAMILY MEDICINE CLINIC | Facility: CLINIC | Age: 44
End: 2024-06-26

## 2024-06-26 VITALS
HEIGHT: 70 IN | OXYGEN SATURATION: 98 % | SYSTOLIC BLOOD PRESSURE: 132 MMHG | BODY MASS INDEX: 37.08 KG/M2 | HEART RATE: 64 BPM | RESPIRATION RATE: 15 BRPM | WEIGHT: 259 LBS | DIASTOLIC BLOOD PRESSURE: 82 MMHG | TEMPERATURE: 98 F

## 2024-06-26 DIAGNOSIS — E66.01 CLASS 2 SEVERE OBESITY DUE TO EXCESS CALORIES WITH SERIOUS COMORBIDITY AND BODY MASS INDEX (BMI) OF 37.0 TO 37.9 IN ADULT (HCC): ICD-10-CM

## 2024-06-26 DIAGNOSIS — Z72.0 SMOKELESS TOBACCO USE: ICD-10-CM

## 2024-06-26 DIAGNOSIS — E78.2 MIXED HYPERLIPIDEMIA: ICD-10-CM

## 2024-06-26 DIAGNOSIS — I10 ESSENTIAL HYPERTENSION, BENIGN: Primary | ICD-10-CM

## 2024-06-26 DIAGNOSIS — Z86.73 HISTORY OF CVA (CEREBROVASCULAR ACCIDENT): ICD-10-CM

## 2024-06-26 DIAGNOSIS — G47.33 OSA (OBSTRUCTIVE SLEEP APNEA): ICD-10-CM

## 2024-06-26 DIAGNOSIS — E78.6 LOW HDL (UNDER 40): ICD-10-CM

## 2024-06-26 DIAGNOSIS — Z71.6 ENCOUNTER FOR TOBACCO USE CESSATION COUNSELING: ICD-10-CM

## 2024-06-26 DIAGNOSIS — Z79.899 ENCOUNTER FOR LONG-TERM CURRENT USE OF MEDICATION: ICD-10-CM

## 2024-06-26 LAB
B2 GLYCOPROT1 IGG SERPL IA-ACNC: 0.8 U/ML (ref ?–7)
B2 GLYCOPROT1 IGM SERPL IA-ACNC: 3.6 U/ML (ref ?–7)
CARDIOLIPIN IGG SERPL-ACNC: 1.1 GPL (ref ?–10)
CARDIOLIPIN IGM SERPL-ACNC: 1.2 MPL (ref ?–10)
PROTEIN S FUNCTION: 119 %

## 2024-06-26 PROCEDURE — 99215 OFFICE O/P EST HI 40 MIN: CPT | Performed by: FAMILY MEDICINE

## 2024-06-26 RX ORDER — ATORVASTATIN CALCIUM 80 MG/1
80 TABLET, FILM COATED ORAL NIGHTLY
Qty: 90 TABLET | Refills: 3 | Status: SHIPPED | OUTPATIENT
Start: 2024-06-26

## 2024-06-26 RX ORDER — AMLODIPINE BESYLATE 10 MG/1
10 TABLET ORAL NIGHTLY
Qty: 90 TABLET | Refills: 3 | Status: SHIPPED | OUTPATIENT
Start: 2024-06-26

## 2024-06-26 RX ORDER — HYDROCHLOROTHIAZIDE 50 MG/1
50 TABLET ORAL DAILY
Qty: 90 TABLET | Refills: 3 | Status: SHIPPED | OUTPATIENT
Start: 2024-06-26

## 2024-06-26 RX ORDER — CARVEDILOL 25 MG/1
25 TABLET ORAL 2 TIMES DAILY WITH MEALS
Qty: 180 TABLET | Refills: 3 | Status: SHIPPED | OUTPATIENT
Start: 2024-06-26

## 2024-06-26 RX ORDER — OLMESARTAN MEDOXOMIL 40 MG/1
40 TABLET ORAL NIGHTLY
Qty: 90 TABLET | Refills: 3 | Status: SHIPPED | OUTPATIENT
Start: 2024-06-26

## 2024-06-26 NOTE — PATIENT INSTRUCTIONS
-Recommend call your insurance to find out if they cover Zepbound or Wegovy which is a once a week injectable medication that helps patients to lose weight.    -Please schedule appointment to see Dr. Elena or Dr. Woodruff for the obstructive sleep apnea soon as possible.    -Please call Dr. Ewing's office and have them update your medication list, let them know that you are on 40 mg of olmesartan not 20 mg of olmesartan nightly.

## 2024-06-26 NOTE — PROGRESS NOTES
Tonio Wyatt is a 44 year old male.     Chief Complaint   Patient presents with    Medication Request    Lab Results    Hypertension    Smoking     smokeless    Hyperlipidemia    Obstructive Sleep Apnea (RAPHAEL)           Obesity         HPI:               Hypertension:  Improved with increase of olmesartan to 40 mg nightly from 20 mg nightly, however blood pressure still not to goal..   Severity is severe and resistant, patient is on max doses of amlodipine, carvedilol, hydrochlorothiazide, and olmesartan.  Pt has been taking medications as instructed, no medication side effects.   Home BP monitoring :  Not checking recently.  Diet: Somewhat watches diet               Exercise: Occasionally      Tobacco use:  Patient uses chewless tobacco pouches.          Wt Readings from Last 6 Encounters:   06/26/24 259 lb (117.5 kg)   04/12/24 258 lb 3.2 oz (117.1 kg)   03/27/24 261 lb (118.4 kg)   02/21/24 260 lb (117.9 kg)   07/20/23 261 lb 9.6 oz (118.7 kg)   01/19/23 270 lb 6.4 oz (122.7 kg)      Body mass index is 37.16 kg/m².        Current Outpatient Medications   Medication Sig Dispense Refill    amLODIPine 10 MG Oral Tab Take 1 tablet (10 mg total) by mouth nightly. 90 tablet 3    atorvastatin 80 MG Oral Tab Take 1 tablet (80 mg total) by mouth nightly. 90 tablet 3    carvedilol 25 MG Oral Tab Take 1 tablet (25 mg total) by mouth 2 (two) times daily with meals. 180 tablet 3    hydroCHLOROthiazide 50 MG Oral Tab Take 1 tablet (50 mg total) by mouth daily. 90 tablet 3    Olmesartan Medoxomil 40 MG Oral Tab Take 1 tablet (40 mg total) by mouth at bedtime. 90 tablet 3    ASPIRIN 325 MG Oral Tab TAKE ONE TABLET BY MOUTH ONE TIME DAILY  90 tablet 0    Cetirizine HCl (ZYRTEC OR) Take by mouth daily.        Past Medical History:    Acute, but ill-defined, cerebrovascular disease    MALU (acute kidney injury) (HCC)    Chest pain    Class 2 severe obesity due to excess calories with serious comorbidity and body mass index  (BMI) of 37.0 to 37.9 in adult (HCC)    Class 2 severe obesity due to excess calories with serious comorbidity and body mass index (BMI) of 37.0 to 37.9 in adult (HCC)    Associated with resistant hypertension, mixed hyperlipidemia, RAPHAEL, and history of CVA       Class 2 severe obesity due to excess calories with serious comorbidity and body mass index (BMI) of 38.0 to 38.9 in adult (HCC)    Associate with hypertension, prediabetes, mixed hyperlipidemia, and RAPHAEL    Class 2 severe obesity due to excess calories with serious comorbidity and body mass index (BMI) of 39.0 to 39.9 in adult (HCC)    Decorative tattoo    Disorder of liver    fatty liver    Dizziness    Dyslipidemia    Goal LDL <70     Essential hypertension, benign    Goal BP <=130/85     Fatigue    Focal infarction of brain (HCC)    Frequent urination    Heartburn    History of ear infection    Hypertensive emergency    Leg swelling    Low HDL (under 40)    Mixed hyperlipidemia    Goal LDL <70     Resistant hypertension    Goal BP <=130/85     Shortness of breath    Sleep apnea    Stress    Syncope    Visual impairment    glasses    Weight gain    17 pound weight gain from 3/24/2021 to 10/1/2021      Past Surgical History:   Procedure Laterality Date    Vasectomy  16    Dr. oHrta      Social History:    Social History     Socioeconomic History    Marital status:    Tobacco Use    Smoking status: Former     Current packs/day: 0.00     Types: Cigarettes     Quit date: 2013     Years since quittin.4    Smokeless tobacco: Current    Tobacco comments:     pt smoked for 15 years   Vaping Use    Vaping status: Former   Substance and Sexual Activity    Alcohol use: Yes     Alcohol/week: 3.0 standard drinks of alcohol     Types: 3 Standard drinks or equivalent per week     Comment: social on weekends    Drug use: No    Sexual activity: Not Currently   Other Topics Concern    Caffeine Concern Yes     Comment: 1 cup coffee    Exercise Yes      Comment: very active at work.     Seat Belt Yes         Family History   Problem Relation Age of Onset    Hypertension Father     Diabetes Father     Hypertension Mother     Asthma Son     Heart Attack Maternal Grandmother     Heart Attack Maternal Grandfather     Heart Attack Paternal Grandmother     Heart Attack Paternal Grandfather      REVIEW OF SYSTEMS:   GENERAL HEALTH: Overall feels well.    SKIN: denies any unusual skin lesions or rashes   RESPIRATORY: Denies: LINDSAY/JOSEPH/Cough/Wheeze/Orthopnea/PND  CARDIOVASCULAR: Denies CP/palpitations  VASCULAR: Denies LE edema, denies claudication type symptoms  GI: Denies abdominal pain/nausea/vomiting/blood in stool/black stool/bloating/constipation/diarrhea  : denies urinary symptoms  NEURO: denies headaches/dizziness/fainting/weakness/change in vision  PSYCH: denies depression and anxiety    Immunization History   Administered Date(s) Administered    >=3 YRS TRI  MULTIDOSE VIAL (22295) FLU CLINIC 10/25/2014    TDAP 10/25/2014   Pended Date(s) Pended    Influenza Vaccine Refused 02/21/2024       EXAM:   /82   Pulse 64   Temp 97.7 °F (36.5 °C) (Temporal)   Resp 15   Ht 5' 10\" (1.778 m)   Wt 259 lb (117.5 kg)   SpO2 98%   BMI 37.16 kg/m²   GENERAL: NAD, pleasant obese  male  HEAD: NCAT  LUNGS: CTA A/P no wheezes/ronchi/rales/crackles  CARDIO: RRR, +S1/S2, no mm  VASCULAR: No lower extremity pitting edema  EXTREMITIES: no cyanosis or clubbing  NEURO: Alert and Oriented x3.  No gross motor or gross sensory abnormalities.  PSYCH: Affect normal.  Normal thought content.        DATA:      Thyroid:    Lab Results   Component Value Date    TSH 1.186 06/21/2024    TSH 2.920 05/15/2023    TSH 2.000 11/22/2021    T4F 1.3 06/21/2024    T4F 1.0 05/15/2023    T4F 1.2 11/22/2021       PSA Screen:    Lab Results   Component Value Date    PSAS 0.41 06/21/2024    PSAS 0.46 05/15/2023     BMP:   No results found for: \"GLUCOSE\"  Lab Results   Component Value Date    K  3.6 06/21/2024    K 3.1 (L) 05/15/2023    K 3.4 (L) 10/27/2022     Lab Results   Component Value Date    BUN 13 06/21/2024    BUN 12 05/15/2023    BUN 11 10/27/2022     Lab Results   Component Value Date    CREATSERUM 0.96 06/21/2024    CREATSERUM 0.96 05/15/2023    CREATSERUM 0.92 10/27/2022           Diabetes:    Lab Results   Component Value Date    A1C 5.6 06/21/2024    A1C 5.9 (A) 07/20/2023    A1C 6.0 (A) 05/25/2022     06/21/2024     03/12/2021     12/19/2014     Lab Results   Component Value Date    CHOLEST 137 06/21/2024    CHOLEST 131 05/15/2023    CHOLEST 143 10/27/2022     Lab Results   Component Value Date    HDL 34 (L) 06/21/2024    HDL 38 (L) 05/15/2023    HDL 36 (L) 10/27/2022     Lab Results   Component Value Date    LDL 86 06/21/2024    LDL 72 05/15/2023    LDL 83 10/27/2022     Lab Results   Component Value Date    TRIG 86 06/21/2024    TRIG 114 05/15/2023    TRIG 132 10/27/2022     Lab Results   Component Value Date    AST 30 06/21/2024    AST 31 05/15/2023    AST 32 10/27/2022     Lab Results   Component Value Date    ALT 53 (H) 06/21/2024    ALT 77 (H) 05/15/2023    ALT 72 (H) 10/27/2022     Most recent progress note from cardiologist in care everywhere read and reviewed.      ASSESSMENT AND PLAN:       Encounter Diagnoses   Name Primary?    Essential hypertension, benign Goal BP <=130/85 Yes    RAPHAEL (obstructive sleep apnea)     History of CVA (cerebrovascular accident)     Mixed hyperlipidemia Goal LDL <70     Low HDL (under 40)     Class 2 severe obesity due to excess calories with serious comorbidity and body mass index (BMI) of 37.0 to 37.9 in adult (HCC)     Encounter for long-term current use of medication     Smokeless tobacco use     Encounter for tobacco use cessation counseling      Time spent was 40 minutes, more than 50% of time was spent on counseling regarding medical conditions and treatment.  Rest of time was spent reviewing chart, reviewing blood work and  radiology tests.         1. Essential hypertension, benign Goal BP <=130/85  Hypertension has improved since increasing olmesartan to 40 mg nightly from 20 mg nightly, however blood pressure not yet to goal.  Patient advised to continue amlodipine, carvedilol, hydrochlorothiazide, and olmesartan as below.  Patient was advised to call his cardiologist to have them update his medication list, when I reviewed their list they had olmesartan 20 mg listed instead of 40 mg nightly listed.  Follow-up on hypertension in 6 months.    -Please call Dr. Ewing's office and have them update your medication list, let them know that you are on 40 mg of olmesartan not 20 mg of olmesartan nightly.    - amLODIPine 10 MG Oral Tab; Take 1 tablet (10 mg total) by mouth nightly.  Dispense: 90 tablet; Refill: 3  - carvedilol 25 MG Oral Tab; Take 1 tablet (25 mg total) by mouth 2 (two) times daily with meals.  Dispense: 180 tablet; Refill: 3  - hydroCHLOROthiazide 50 MG Oral Tab; Take 1 tablet (50 mg total) by mouth daily.  Dispense: 90 tablet; Refill: 3  - Olmesartan Medoxomil 40 MG Oral Tab; Take 1 tablet (40 mg total) by mouth at bedtime.  Dispense: 90 tablet; Refill: 3    2. RAPHAEL (obstructive sleep apnea)  Currently untreated.  Patient reports he had to cancel appointment that he had scheduled with Dr. Woodruff due to issues at work.  Patient again strongly advised to make an appointment to see sleep specialist to have his RAPHAEL treated.    -Please schedule appointment to see Dr. Elena or Dr. Woodrfuf for the obstructive sleep apnea soon as possible.    - Pulmonary Referral - In Network    3. History of CVA (cerebrovascular accident)  Patient is at increased risk for another CVA due to untreated RAPHAEL.  Recommend continue antihypertensive medications.  Recommend continue atorvastatin 80 mg nightly.  Recommend continue aspirin 325 mg daily.  Follow-up with neurologist as recommended.    - amLODIPine 10 MG Oral Tab; Take 1 tablet (10 mg total) by  mouth nightly.  Dispense: 90 tablet; Refill: 3  - atorvastatin 80 MG Oral Tab; Take 1 tablet (80 mg total) by mouth nightly.  Dispense: 90 tablet; Refill: 3    4. Mixed hyperlipidemia Goal LDL <70  5. Low HDL (under 40)  LDL and triglycerides in normal range.  HDL below normal.  Recommend continue atorvastatin 80 mg nightly, consider add Zetia in the future.  Follow-up on hypercholesterolemia in 6 months, sooner if needed.    - atorvastatin 80 MG Oral Tab; Take 1 tablet (80 mg total) by mouth nightly.  Dispense: 90 tablet; Refill: 3    6. Class 2 severe obesity due to excess calories with serious comorbidity and body mass index (BMI) of 37.0 to 37.9 in adult (HCC)  Recommend patient contact his insurance to find out if they cover Zepbound or Wegovy, would recommend starting 1 of these medications to help lose weight, patient is high risk for another CVA.    7. Encounter for long-term current use of medication  Medication use, risks, benefits, side effects and precautions discussed, patient verbalizes understanding. Questions encouraged and answered to patient's satisfaction.    - amLODIPine 10 MG Oral Tab; Take 1 tablet (10 mg total) by mouth nightly.  Dispense: 90 tablet; Refill: 3  - atorvastatin 80 MG Oral Tab; Take 1 tablet (80 mg total) by mouth nightly.  Dispense: 90 tablet; Refill: 3  - carvedilol 25 MG Oral Tab; Take 1 tablet (25 mg total) by mouth 2 (two) times daily with meals.  Dispense: 180 tablet; Refill: 3  - hydroCHLOROthiazide 50 MG Oral Tab; Take 1 tablet (50 mg total) by mouth daily.  Dispense: 90 tablet; Refill: 3  - Olmesartan Medoxomil 40 MG Oral Tab; Take 1 tablet (40 mg total) by mouth at bedtime.  Dispense: 90 tablet; Refill: 3    8. Smokeless tobacco use  9. Encounter for tobacco use cessation counseling  Patient advised to stop using chewless tobacco, patient currently not interested in tobacco use cessation.              Meds & Refills for this Visit:  Requested Prescriptions     Signed  Prescriptions Disp Refills    amLODIPine 10 MG Oral Tab 90 tablet 3     Sig: Take 1 tablet (10 mg total) by mouth nightly.    atorvastatin 80 MG Oral Tab 90 tablet 3     Sig: Take 1 tablet (80 mg total) by mouth nightly.    carvedilol 25 MG Oral Tab 180 tablet 3     Sig: Take 1 tablet (25 mg total) by mouth 2 (two) times daily with meals.    hydroCHLOROthiazide 50 MG Oral Tab 90 tablet 3     Sig: Take 1 tablet (50 mg total) by mouth daily.    Olmesartan Medoxomil 40 MG Oral Tab 90 tablet 3     Sig: Take 1 tablet (40 mg total) by mouth at bedtime.       Imaging & Consults:  PULMONARY - INTERNAL    Return in about 6 months (around 12/26/2024) for Hypertension, hyperlipidemia, and history of stroke.

## 2024-08-09 ENCOUNTER — HOSPITAL ENCOUNTER (OUTPATIENT)
Dept: MRI IMAGING | Facility: HOSPITAL | Age: 44
Discharge: HOME | End: 2024-08-09
Attending: FAMILY MEDICINE
Payer: COMMERCIAL

## 2024-08-09 DIAGNOSIS — I67.83 PRES (POSTERIOR REVERSIBLE ENCEPHALOPATHY SYNDROME): ICD-10-CM

## 2024-08-09 DIAGNOSIS — Z86.73 HISTORY OF CVA (CEREBROVASCULAR ACCIDENT): ICD-10-CM

## 2024-08-09 DIAGNOSIS — R40.20 LOC (LOSS OF CONSCIOUSNESS) (HCC): ICD-10-CM

## 2024-08-09 DIAGNOSIS — G35 MS (MULTIPLE SCLEROSIS) (HCC): ICD-10-CM

## 2024-08-09 PROCEDURE — A9575 INJ GADOTERATE MEGLUMI 0.1ML: HCPCS | Performed by: FAMILY MEDICINE

## 2024-08-09 PROCEDURE — 70553 MRI BRAIN STEM W/O & W/DYE: CPT | Performed by: FAMILY MEDICINE

## 2024-08-09 RX ORDER — GADOTERATE MEGLUMINE 376.9 MG/ML
20 INJECTION INTRAVENOUS
Status: COMPLETED | OUTPATIENT
Start: 2024-08-09 | End: 2024-08-09

## 2024-08-09 RX ADMIN — GADOTERATE MEGLUMINE 20 ML: 376.9 INJECTION INTRAVENOUS at 15:35:00

## 2024-08-12 ENCOUNTER — TELEPHONE (OUTPATIENT)
Dept: FAMILY MEDICINE CLINIC | Facility: CLINIC | Age: 44
End: 2024-08-12

## 2024-08-12 NOTE — TELEPHONE ENCOUNTER
----- Message from Eli Granados sent at 8/9/2024  3:57 PM CDT -----  Please call patient and be sure to speak directly with patient:  MRI report findings consistent with MS and other abnormalities, please advise patient to schedule appointment to see the neurologist at his earliest convenience, he can see Dr. Green or one of her colleagues, whoever is first available, please see contact information below.    Elizabeth Green, DO   [address] [address] [phone]

## 2024-08-20 ENCOUNTER — OFFICE VISIT (OUTPATIENT)
Dept: NEUROLOGY | Facility: CLINIC | Age: 44
End: 2024-08-20
Payer: COMMERCIAL

## 2024-08-20 VITALS
BODY MASS INDEX: 37.37 KG/M2 | HEIGHT: 70 IN | DIASTOLIC BLOOD PRESSURE: 88 MMHG | HEART RATE: 71 BPM | RESPIRATION RATE: 16 BRPM | OXYGEN SATURATION: 98 % | SYSTOLIC BLOOD PRESSURE: 140 MMHG | WEIGHT: 261 LBS

## 2024-08-20 DIAGNOSIS — R90.89 ABNORMAL BRAIN MRI: ICD-10-CM

## 2024-08-20 DIAGNOSIS — R41.3 MEMORY LOSS: ICD-10-CM

## 2024-08-20 DIAGNOSIS — I63.9 CEREBROVASCULAR ACCIDENT (CVA), UNSPECIFIED MECHANISM (HCC): Primary | ICD-10-CM

## 2024-08-20 PROCEDURE — 99215 OFFICE O/P EST HI 40 MIN: CPT | Performed by: OTHER

## 2024-08-20 NOTE — PATIENT INSTRUCTIONS
Refill policies:    Allow 2-3 business days for refills; controlled substances may take longer.  Contact your pharmacy at least 5 days prior to running out of medication and have them send an electronic request or submit request through the “request refill” option in your [employer] account.  Refills are not addressed on weekends; covering physicians do not authorize routine medications on weekends.  No narcotics or controlled substances are refilled after noon on Fridays or by on call physicians.  By law, narcotics must be electronically prescribed.  A 30 day supply with no refills is the maximum allowed.  If your prescription is due for a refill, you may be due for a follow up appointment.  To best provide you care, patients receiving routine medications need to be seen at least once a year.  Patients receiving narcotic/controlled substance medications need to be seen at least once every 3 months.  In the event that your preferred pharmacy does not have the requested medication in stock (e.g. Backordered), it is your responsibility to find another pharmacy that has the requested medication available.  We will gladly send a new prescription to that pharmacy at your request.    Scheduling Tests:    If your physician has ordered radiology tests such as MRI or CT scans, please contact Central Scheduling at [phone] right away to schedule the test.  Once scheduled, the [facility] Centralized Referral Team will work with your insurance carrier to obtain pre-certification or prior authorization.  Depending on your insurance carrier, approval may take 3-10 days.  It is highly recommended patients assure they have received an authorization before having a test performed.  If test is done without insurance authorization, patient may be responsible for the entire amount billed.      Precertification and Prior Authorizations:  If your physician has recommended that you have a procedure or additional testing performed the [facility]  Centralized Referral Team will contact your insurance carrier to obtain pre-certification or prior authorization.    You are strongly encouraged to contact your insurance carrier to verify that your procedure/test has been approved and is a COVERED benefit.  Although the [facility] Centralized Referral Team does its due diligence, the insurance carrier gives the disclaimer that \"Although the procedure is authorized, this does not guarantee payment.\"    Ultimately the patient is responsible for payment.   Thank you for your understanding in this matter.  Paperwork Completion:  If you require FMLA or disability paperwork for your recovery, please make sure to either drop it off or have it faxed to our office at [phone]. Be sure the form has your name and date of birth on it.  The form will be faxed to our Forms Department and they will complete it for you.  There is a 25$ fee for all forms that need to be filled out.  Please be aware there is a 10-14 day turnaround time.  You will need to sign a release of information (DERICK) form if your paperwork does not come with one.  You may call the Forms Department with any questions at [phone].  Their fax number is [phone].

## 2024-09-16 ENCOUNTER — OFFICE VISIT (OUTPATIENT)
Facility: CLINIC | Age: 44
End: 2024-09-16
Payer: COMMERCIAL

## 2024-09-16 VITALS
WEIGHT: 262 LBS | RESPIRATION RATE: 16 BRPM | HEIGHT: 70 IN | DIASTOLIC BLOOD PRESSURE: 82 MMHG | SYSTOLIC BLOOD PRESSURE: 130 MMHG | HEART RATE: 73 BPM | BODY MASS INDEX: 37.51 KG/M2 | OXYGEN SATURATION: 98 %

## 2024-09-16 DIAGNOSIS — Z86.73 HISTORY OF CVA (CEREBROVASCULAR ACCIDENT): ICD-10-CM

## 2024-09-16 DIAGNOSIS — I67.83 PRES (POSTERIOR REVERSIBLE ENCEPHALOPATHY SYNDROME): Primary | ICD-10-CM

## 2024-09-16 DIAGNOSIS — G47.33 OBSTRUCTIVE SLEEP APNEA: ICD-10-CM

## 2024-09-16 PROCEDURE — 99204 OFFICE O/P NEW MOD 45 MIN: CPT | Performed by: OTHER

## 2024-09-16 NOTE — PROGRESS NOTES
[facility] PULMONARY  SLEEP PROGRESS NOTE        HPI:   This is a 44 year old male coming in for   Chief Complaint   Patient presents with    New Patient     Ref by Dr. Granados   Pt diagnosed with RAPHAEL and does not currently use machine. S/S performed 7-   Sleep questionnaire: 1/24       HPI:   Cardiology and Neuro advised him to have a sleep study  In 6/2021  4 strokes due to hypertension    Sleeping on sides  Spouse is no longer reporting snoring, daytime sleepiness  Interestingly patient no longer having snoring witnessed apnea or similar fatigue  He has gained about 12 pounds since last tested.  Working out but not losing weight  Does toss and turn  He may wake once through the night  Unable to sleep on his back  Goes to bed 9/10-5am,   mouthbreathing      Patient: Sleep review of systems today: see form.      Pt  PCP:  Eli Granados,   No referring provider defined for this encounter.           No data to display                    Past Medical History:    Acute, but ill-defined, cerebrovascular disease    MALU (acute kidney injury) ([facility])    Chest pain    Class 2 severe obesity due to excess calories with serious comorbidity and body mass index (BMI) of 37.0 to 37.9 in adult (HCC)    Class 2 severe obesity due to excess calories with serious comorbidity and body mass index (BMI) of 37.0 to 37.9 in adult (HCC)    Associated with resistant hypertension, mixed hyperlipidemia, RAPHAEL, and history of CVA       Class 2 severe obesity due to excess calories with serious comorbidity and body mass index (BMI) of 38.0 to 38.9 in adult (HCC)    Associate with hypertension, prediabetes, mixed hyperlipidemia, and RAPHAEL    Class 2 severe obesity due to excess calories with serious comorbidity and body mass index (BMI) of 39.0 to 39.9 in adult (HCC)    Decorative tattoo    Disorder of liver    fatty liver    Dizziness    Dyslipidemia    Goal LDL <70     Easy bruising    Essential hypertension, benign    Goal BP <=130/85      Fatigue    Flatulence/gas pain/belching    Focal infarction of brain (HCC)    Food intolerance    Frequent urination    Heartburn    History of ear infection    Hypertensive emergency    Indigestion    Leg swelling    Low HDL (under 40)    Mixed hyperlipidemia    Goal LDL <70     Resistant hypertension    Goal BP <=130/85     Shortness of breath    Sleep apnea    Sleep disturbance    Stress    Syncope    Visual impairment    glasses    Wears glasses    Weight gain    17 pound weight gain from 3/24/2021 to 10/1/2021     Past Surgical History:   Procedure Laterality Date    Vasectomy  12/4/14    Dr. Horta     Social History:  Social History     Social History Narrative    Not on file     Social History     Socioeconomic History    Marital status:    Tobacco Use    Smoking status: Former     Current packs/day: 0.00     Average packs/day: 1.5 packs/day for 14.0 years (21.0 ttl pk-yrs)     Types: Cigarettes     Quit date: 7/1/2014     Years since quitting: 10.2    Smokeless tobacco: Current    Tobacco comments:     pt smoked for 15 years   Vaping Use    Vaping status: Former   Substance and Sexual Activity    Alcohol use: Yes     Alcohol/week: 3.0 standard drinks of alcohol     Types: 3 Standard drinks or equivalent per week     Comment: social on weekends    Drug use: Yes     Types: Cannabis     Comment: Gummies    Sexual activity: Not Currently   Other Topics Concern    Caffeine Concern Yes     Comment: 1 cup coffee    Exercise Yes     Comment: very active at work.     Seat Belt Yes     Family History:  Family History   Problem Relation Age of Onset    Hypertension Father     Diabetes Father     Hypertension Mother     Asthma Son     Heart Attack Maternal Grandmother     Heart Attack Maternal Grandfather     Heart Attack Paternal Grandmother     Heart Attack Paternal Grandfather      Allergies:  No Known Allergies  Current Meds:  Current Outpatient Medications   Medication Sig Dispense Refill    amLODIPine 10  MG Oral Tab Take 1 tablet (10 mg total) by mouth nightly. 90 tablet 3    atorvastatin 80 MG Oral Tab Take 1 tablet (80 mg total) by mouth nightly. 90 tablet 3    carvedilol 25 MG Oral Tab Take 1 tablet (25 mg total) by mouth 2 (two) times daily with meals. 180 tablet 3    hydroCHLOROthiazide 50 MG Oral Tab Take 1 tablet (50 mg total) by mouth daily. 90 tablet 3    Olmesartan Medoxomil 40 MG Oral Tab Take 1 tablet (40 mg total) by mouth at bedtime. 90 tablet 3    ASPIRIN 325 MG Oral Tab TAKE ONE TABLET BY MOUTH ONE TIME DAILY  90 tablet 0    Cetirizine HCl (ZYRTEC OR) Take by mouth daily.        Ready to quit: Not Answered  Counseling given: Not Answered  Tobacco comments: pt smoked for 15 years         Problem List:  Patient Active Problem List   Diagnosis    Essential hypertension, benign    Low testosterone    Mixed hyperlipidemia    Low HDL (under 40)    PRES (posterior reversible encephalopathy syndrome)    Word finding difficulty    Elevated liver enzymes    Dyslipidemia    Allergic rhinitis    Fatty liver    History of CVA (cerebrovascular accident)    Encounter for weight loss counseling    Prediabetes    Bilateral lower extremity edema    Dry skin    Obstructive sleep apnea    Elevated coronary artery calcium score    Venous insufficiency of both lower extremities    Erectile dysfunction    Chronic fatigue    Class 2 severe obesity due to excess calories with serious comorbidity and body mass index (BMI) of 37.0 to 37.9 in adult (HCC)    Resistant hypertension    LOC (loss of consciousness) ([facility])    Encounter for long-term current use of medication    Smokeless tobacco use    Encounter for tobacco use cessation counseling       REVIEW OF SYSTEMS:   Review of Systems    EXAM:   /82   Pulse 73   Resp 16   Ht 5' 10\" (1.778 m)   Wt 262 lb (118.8 kg)   SpO2 98%   BMI 37.59 kg/m²  Estimated body mass index is 37.59 kg/m² as calculated from the following:    Height as of this encounter: 5' 10\" (1.778  m).    Weight as of this encounter: 262 lb (118.8 kg).   Neck in inches:      Wt Readings from Last 6 Encounters:   09/16/24 262 lb (118.8 kg)   08/20/24 261 lb (118.4 kg)   08/16/24 261 lb (118.4 kg)   06/26/24 259 lb (117.5 kg)   04/12/24 258 lb 3.2 oz (117.1 kg)   03/27/24 261 lb (118.4 kg)     BP Readings from Last 3 Encounters:   09/16/24 130/82   08/20/24 140/88   08/16/24 (!) 147/92     Pulse Readings from Last 3 Encounters:   09/16/24 73   08/20/24 71   08/16/24 76     SpO2 Readings from Last 3 Encounters:   09/16/24 98%   08/20/24 98%   06/26/24 98%      Ideal body weight: 73 kg (160 lb 15 oz)  Adjusted ideal body weight: 91.3 kg (201 lb 5.8 oz)    Vital signs reviewed.  Physical Exam  [address] 3  ASSESSMENT AND PLAN:   1. PRES (posterior reversible encephalopathy syndrome)//strokes    2. History of CVA (cerebrovascular accident)    3. Obstructive sleep apnea  Perform repeat diagnostic INLAB study to evaluate since change of report of symptoms, no regular snoring, or witnessed apnea. Last test in 2021  After repeat diagnostic, will review options. Given stroke history, does need in-lab to evaluate for central sleep apnea.   There are no Patient Instructions on file for this visit.          Meds & Refills for this Visit:  Requested Prescriptions      No prescriptions requested or ordered in this encounter       Outcome: Parent verbalizes understanding. Parent is notified to call with any questions, complications, allergies, or worsening or changing symptoms.  Parent is to call with any side effects or complications from the treatments as a result of today.     \" This note was created utilizing Dragon speech recognition software.  Please excuse any grammatical errors. Call my office if you have any questions regarding this note. \"

## 2024-10-21 NOTE — TELEPHONE ENCOUNTER
Spoke with patient. Discussed results. Dr Granados recommended that he follow up with Dr Jurado. Patient verbalized understanding.    Nsaids Counseling: NSAID Counseling: I discussed with the patient that NSAIDs should be taken with food. Prolonged use of NSAIDs can result in the development of stomach ulcers.  Patient advised to stop taking NSAIDs if abdominal pain occurs.  The patient verbalized understanding of the proper use and possible adverse effects of NSAIDs.  All of the patient's questions and concerns were addressed. Elidel Pregnancy And Lactation Text: This medication is Pregnancy Category C. It is unknown if this medication is excreted in breast milk. Winlevi Pregnancy And Lactation Text: This medication is considered safe during pregnancy and breastfeeding. Ketoconazole Counseling:   Patient counseled regarding improving absorption with orange juice.  Adverse effects include but are not limited to breast enlargement, headache, diarrhea, nausea, upset stomach, liver function test abnormalities, taste disturbance, and stomach pain.  There is a rare possibility of liver failure that can occur when taking ketoconazole. The patient understands that monitoring of LFTs may be required, especially at baseline. The patient verbalized understanding of the proper use and possible adverse effects of ketoconazole.  All of the patient's questions and concerns were addressed. Metronidazole Counseling:  I discussed with the patient the risks of metronidazole including but not limited to seizures, nausea/vomiting, a metallic taste in the mouth, nausea/vomiting and severe allergy. Azathioprine Pregnancy And Lactation Text: This medication is Pregnancy Category D and isn't considered safe during pregnancy. It is unknown if this medication is excreted in breast milk. Hydroxyzine Pregnancy And Lactation Text: This medication is not safe during pregnancy and should not be taken. It is also excreted in breast milk and breast feeding isn't recommended. Topical Metronidazole Counseling: Metronidazole is a topical antibiotic medication. You may experience burning, stinging, redness, or allergic reactions.  Please call our office if you develop any problems from using this medication. Xeljanz Counseling: I discussed with the patient the risks of Xeljanz therapy including increased risk of infection, liver issues, headache, diarrhea, or cold symptoms. Live vaccines should be avoided. They were instructed to call if they have any problems. Gabapentin Pregnancy And Lactation Text: This medication is Pregnancy Category C and isn't considered safe during pregnancy. It is excreted in breast milk. Bactrim Pregnancy And Lactation Text: This medication is Pregnancy Category D and is known to cause fetal risk.  It is also excreted in breast milk. Spevigo Pregnancy And Lactation Text: The risk during pregnancy and breastfeeding is uncertain with this medication. This medication does cross the placenta. It is unknown if this medication is found in breast milk. Carac Pregnancy And Lactation Text: This medication is Pregnancy Category X and contraindicated in pregnancy and in women who may become pregnant. It is unknown if this medication is excreted in breast milk. Propranolol Counseling:  I discussed with the patient the risks of propranolol including but not limited to low heart rate, low blood pressure, low blood sugar, restlessness and increased cold sensitivity. They should call the office if they experience any of these side effects. Rituxan Counseling:  I discussed with the patient the risks of Rituxan infusions. Side effects can include infusion reactions, severe drug rashes including mucocutaneous reactions, reactivation of latent hepatitis and other infections and rarely progressive multifocal leukoencephalopathy.  All of the patient's questions and concerns were addressed. Arava Counseling:  Patient counseled regarding adverse effects of Arava including but not limited to nausea, vomiting, abnormalities in liver function tests. Patients may develop mouth sores, rash, diarrhea, and abnormalities in blood counts. The patient understands that monitoring is required including LFTs and blood counts.  There is a rare possibility of scarring of the liver and lung problems that can occur when taking methotrexate. Persistent nausea, loss of appetite, pale stools, dark urine, cough, and shortness of breath should be reported immediately. Patient advised to discontinue Arava treatment and consult with a physician prior to attempting conception. The patient will have to undergo a treatment to eliminate Arava from the body prior to conception. Finasteride Male Counseling: Finasteride Counseling:  I discussed with the patient the risks of use of finasteride including but not limited to decreased libido, decreased ejaculate volume, gynecomastia, and depression. Women should not handle medication.  All of the patient's questions and concerns were addressed. High Dose Vitamin A Pregnancy And Lactation Text: High dose vitamin A therapy is contraindicated during pregnancy and breast feeding. Erivedge Counseling- I discussed with the patient the risks of Erivedge including but not limited to nausea, vomiting, diarrhea, constipation, weight loss, changes in the sense of taste, decreased appetite, muscle spasms, and hair loss.  The patient verbalized understanding of the proper use and possible adverse effects of Erivedge.  All of the patient's questions and concerns were addressed. Qbrexza Counseling:  I discussed with the patient the risks of Qbrexza including but not limited to headache, mydriasis, blurred vision, dry eyes, nasal dryness, dry mouth, dry throat, dry skin, urinary hesitation, and constipation.  Local skin reactions including erythema, burning, stinging, and itching can also occur. Tetracycline Counseling: Patient counseled regarding possible photosensitivity and increased risk for sunburn.  Patient instructed to avoid sunlight, if possible.  When exposed to sunlight, patients should wear protective clothing, sunglasses, and sunscreen.  The patient was instructed to call the office immediately if the following severe adverse effects occur:  hearing changes, easy bruising/bleeding, severe headache, or vision changes.  The patient verbalized understanding of the proper use and possible adverse effects of tetracycline.  All of the patient's questions and concerns were addressed. Patient understands to avoid pregnancy while on therapy due to potential birth defects. Prednisone Pregnancy And Lactation Text: This medication is Pregnancy Category C and it isn't know if it is safe during pregnancy. This medication is excreted in breast milk. Valtrex Pregnancy And Lactation Text: this medication is Pregnancy Category B and is considered safe during pregnancy. This medication is not directly found in breast milk but it's metabolite acyclovir is present. Metronidazole Pregnancy And Lactation Text: This medication is Pregnancy Category B and considered safe during pregnancy.  It is also excreted in breast milk. Cellcept Counseling:  I discussed with the patient the risks of mycophenolate mofetil including but not limited to infection/immunosuppression, GI upset, hypokalemia, hypercholesterolemia, bone marrow suppression, lymphoproliferative disorders, malignancy, GI ulceration/bleed/perforation, colitis, interstitial lung disease, kidney failure, progressive multifocal leukoencephalopathy, and birth defects.  The patient understands that monitoring is required including a baseline creatinine and regular CBC testing. In addition, patient must alert us immediately if symptoms of infection or other concerning signs are noted. VTAMA Counseling: I discussed with the patient that VTAMA is not for use in the eyes, mouth or mouth. They should call the office if they develop any signs of allergic reactions to VTAMA. The patient verbalized understanding of the proper use and possible adverse effects of VTAMA.  All of the patient's questions and concerns were addressed. Cimzia Counseling:  I discussed with the patient the risks of Cimzia including but not limited to immunosuppression, allergic reactions and infections.  The patient understands that monitoring is required including a PPD at baseline and must alert us or the primary physician if symptoms of infection or other concerning signs are noted. Ketoconazole Pregnancy And Lactation Text: This medication is Pregnancy Category C and it isn't know if it is safe during pregnancy. It is also excreted in breast milk and breast feeding isn't recommended. Minoxidil Pregnancy And Lactation Text: This medication has not been assigned a Pregnancy Risk Category but animal studies failed to show danger with the topical medication. It is unknown if the medication is excreted in breast milk. Enbrel Pregnancy And Lactation Text: This medication is Pregnancy Category B and is considered safe during pregnancy. It is unknown if this medication is excreted in breast milk. Glycopyrrolate Counseling:  I discussed with the patient the risks of glycopyrrolate including but not limited to skin rash, drowsiness, dry mouth, difficulty urinating, and blurred vision. Stelara Counseling:  I discussed with the patient the risks of ustekinumab including but not limited to immunosuppression, malignancy, posterior leukoencephalopathy syndrome, and serious infections.  The patient understands that monitoring is required including a PPD at baseline and must alert us or the primary physician if symptoms of infection or other concerning signs are noted. Tazorac Counseling:  Patient advised that medication is irritating and drying.  Patient may need to apply sparingly and wash off after an hour before eventually leaving it on overnight.  The patient verbalized understanding of the proper use and possible adverse effects of tazorac.  All of the patient's questions and concerns were addressed. Eucrisa Counseling: Patient may experience a mild burning sensation during topical application. Eucrisa is not approved in children less than 3 months of age. Nsaids Pregnancy And Lactation Text: These medications are considered safe up to 30 weeks gestation. It is excreted in breast milk. Cephalexin Counseling: I counseled the patient regarding use of cephalexin as an antibiotic for prophylactic and/or therapeutic purposes. Cephalexin (commonly prescribed under brand name Keflex) is a cephalosporin antibiotic which is active against numerous classes of bacteria, including most skin bacteria. Side effects may include nausea, diarrhea, gastrointestinal upset, rash, hives, yeast infections, and in rare cases, hepatitis, kidney disease, seizures, fever, confusion, neurologic symptoms, and others. Patients with severe allergies to penicillin medications are cautioned that there is about a 10% incidence of cross-reactivity with cephalosporins. When possible, patients with penicillin allergies should use alternatives to cephalosporins for antibiotic therapy. Finasteride Female Counseling: Finasteride Counseling:  I discussed with the patient the risks of use of finasteride including but not limited to decreased libido and sexual dysfunction. Explained the teratogenic nature of the medication and stressed the importance of not getting pregnant during treatment. All of the patient's questions and concerns were addressed. Calcipotriene Counseling:  I discussed with the patient the risks of calcipotriene including but not limited to erythema, scaling, itching, and irritation. Rituxan Pregnancy And Lactation Text: This medication is Pregnancy Category C and it isn't know if it is safe during pregnancy. It is unknown if this medication is excreted in breast milk but similar antibodies are known to be excreted. Erivedge Pregnancy And Lactation Text: This medication is Pregnancy Category X and is absolutely contraindicated during pregnancy. It is unknown if it is excreted in breast milk. Use Enhanced Medication Counseling?: No Xelmartaz Pregnancy And Lactation Text: This medication is Pregnancy Category D and is not considered safe during pregnancy.  The risk during breast feeding is also uncertain. Topical Metronidazole Pregnancy And Lactation Text: This medication is Pregnancy Category B and considered safe during pregnancy.  It is also considered safe to use while breastfeeding. Qbrexza Pregnancy And Lactation Text: There is no available data on Qbrexza use in pregnant women.  There is no available data on Qbrexza use in lactation. Terbinafine Counseling: Patient counseling regarding adverse effects of terbinafine including but not limited to headache, diarrhea, rash, upset stomach, liver function test abnormalities, itching, taste/smell disturbance, nausea, abdominal pain, and flatulence.  There is a rare possibility of liver failure that can occur when taking terbinafine.  The patient understands that a baseline LFT and kidney function test may be required. The patient verbalized understanding of the proper use and possible adverse effects of terbinafine.  All of the patient's questions and concerns were addressed. Humira Counseling:  I discussed with the patient the risks of adalimumab including but not limited to myelosuppression, immunosuppression, autoimmune hepatitis, demyelinating diseases, lymphoma, and serious infections.  The patient understands that monitoring is required including a PPD at baseline and must alert us or the primary physician if symptoms of infection or other concerning signs are noted. Detail Level: Zone Vtama Pregnancy And Lactation Text: It is unknown if this medication can cause problems during pregnancy and breastfeeding. Mirvaso Counseling: Mirvaso is a topical medication which can decrease superficial blood flow where applied. Side effects are uncommon and include stinging, redness and allergic reactions. Albendazole Counseling:  I discussed with the patient the risks of albendazole including but not limited to cytopenia, kidney damage, nausea/vomiting and severe allergy.  The patient understands that this medication is being used in an off-label manner. Cimzia Pregnancy And Lactation Text: This medication crosses the placenta but can be considered safe in certain situations. Cimzia may be excreted in breast milk. Tetracycline Pregnancy And Lactation Text: This medication is Pregnancy Category D and not consider safe during pregnancy. It is also excreted in breast milk. Propranolol Pregnancy And Lactation Text: This medication is Pregnancy Category C and it isn't known if it is safe during pregnancy. It is excreted in breast milk. Cibinqo Counseling: I discussed with the patient the risks of Cibinqo therapy including but not limited to common cold, nausea, headache, cold sores, increased blood CPK levels, dizziness, UTIs, fatigue, acne, and vomitting. Live vaccines should be avoided.  This medication has been linked to serious infections; higher rate of mortality; malignancy and lymphoproliferative disorders; major adverse cardiovascular events; thrombosis; thrombocytopenia and lymphopenia; lipid elevations; and retinal detachment. Olanzapine Counseling- I discussed with the patient the common side effects of olanzapine including but are not limited to: lack of energy, dry mouth, increased appetite, sleepiness, tremor, constipation, dizziness, changes in behavior, or restlessness.  Explained that teenagers are more likely to experience headaches, abdominal pain, pain in the arms or legs, tiredness, and sleepiness.  Serious side effects include but are not limited: increased risk of death in elderly patients who are confused, have memory loss, or dementia-related psychosis; hyperglycemia; increased cholesterol and triglycerides; and weight gain. Minocycline Counseling: Patient advised regarding possible photosensitivity and discoloration of the teeth, skin, lips, tongue and gums.  Patient instructed to avoid sunlight, if possible.  When exposed to sunlight, patients should wear protective clothing, sunglasses, and sunscreen.  The patient was instructed to call the office immediately if the following severe adverse effects occur:  hearing changes, easy bruising/bleeding, severe headache, or vision changes.  The patient verbalized understanding of the proper use and possible adverse effects of minocycline.  All of the patient's questions and concerns were addressed. Rhofade Counseling: Rhofade is a topical medication which can decrease superficial blood flow where applied. Side effects are uncommon and include stinging, redness and allergic reactions. Libtayo Counseling- I discussed with the patient the risks of Libtayo including but not limited to nausea, vomiting, diarrhea, and bone or muscle pain.  The patient verbalized understanding of the proper use and possible adverse effects of Libtayo.  All of the patient's questions and concerns were addressed. Calcipotriene Pregnancy And Lactation Text: The use of this medication during pregnancy or lactation is not recommended as there is insufficient data. Tazorac Pregnancy And Lactation Text: This medication is not safe during pregnancy. It is unknown if this medication is excreted in breast milk. Glycopyrrolate Pregnancy And Lactation Text: This medication is Pregnancy Category B and is considered safe during pregnancy. It is unknown if it is excreted breast milk. Topical Steroids Counseling: I discussed with the patient that prolonged use of topical steroids can result in the increased appearance of superficial blood vessels (telangiectasias), lightening (hypopigmentation) and thinning of the skin (atrophy).  Patient understands to avoid using high potency steroids in skin folds, the groin or the face.  The patient verbalized understanding of the proper use and possible adverse effects of topical steroids.  All of the patient's questions and concerns were addressed. Cephalexin Pregnancy And Lactation Text: This medication is Pregnancy Category B and considered safe during pregnancy.  It is also excreted in breast milk but can be used safely for shorter doses. Cosentyx Counseling:  I discussed with the patient the risks of Cosentyx including but not limited to worsening of Crohn's disease, immunosuppression, allergic reactions and infections.  The patient understands that monitoring is required including a PPD at baseline and must alert us or the primary physician if symptoms of infection or other concerning signs are noted. Clofazimine Counseling:  I discussed with the patient the risks of clofazimine including but not limited to skin and eye pigmentation, liver damage, nausea/vomiting, gastrointestinal bleeding and allergy. SSKI Counseling:  I discussed with the patient the risks of SSKI including but not limited to thyroid abnormalities, metallic taste, GI upset, fever, headache, acne, arthralgias, paraesthesias, lymphadenopathy, easy bleeding, arrhythmias, and allergic reaction. Albendazole Pregnancy And Lactation Text: This medication is Pregnancy Category C and it isn't known if it is safe during pregnancy. It is also excreted in breast milk. Aklief counseling:  Patient advised to apply a pea-sized amount only at bedtime and wait 30 minutes after washing their face before applying.  If too drying, patient may add a non-comedogenic moisturizer.  The most commonly reported side effects including irritation, redness, scaling, dryness, stinging, burning, itching, and increased risk of sunburn.  The patient verbalized understanding of the proper use and possible adverse effects of retinoids.  All of the patient's questions and concerns were addressed. Finasteride Pregnancy And Lactation Text: This medication is absolutely contraindicated during pregnancy. It is unknown if it is excreted in breast milk. Siliq Counseling:  I discussed with the patient the risks of Siliq including but not limited to new or worsening depression, suicidal thoughts and behavior, immunosuppression, malignancy, posterior leukoencephalopathy syndrome, and serious infections.  The patient understands that monitoring is required including a PPD at baseline and must alert us or the primary physician if symptoms of infection or other concerning signs are noted. There is also a special program designed to monitor depression which is required with Siliq. Olanzapine Pregnancy And Lactation Text: This medication is pregnancy category C.   There are no adequate and well controlled trials with olanzapine in pregnant females.  Olanzapine should be used during pregnancy only if the potential benefit justifies the potential risk to the fetus.   In a study in lactating healthy women, olanzapine was excreted in breast milk.  It is recommended that women taking olanzapine should not breast feed. Acitretin Counseling:  I discussed with the patient the risks of acitretin including but not limited to hair loss, dry lips/skin/eyes, liver damage, hyperlipidemia, depression/suicidal ideation, photosensitivity.  Serious rare side effects can include but are not limited to pancreatitis, pseudotumor cerebri, bony changes, clot formation/stroke/heart attack.  Patient understands that alcohol is contraindicated since it can result in liver toxicity and significantly prolong the elimination of the drug by many years. Terbinafine Pregnancy And Lactation Text: This medication is Pregnancy Category B and is considered safe during pregnancy. It is also excreted in breast milk and breast feeding isn't recommended. Mirvaso Pregnancy And Lactation Text: This medication has not been assigned a Pregnancy Risk Category. It is unknown if the medication is excreted in breast milk. Cyclophosphamide Counseling:  I discussed with the patient the risks of cyclophosphamide including but not limited to hair loss, hormonal abnormalities, decreased fertility, abdominal pain, diarrhea, nausea and vomiting, bone marrow suppression and infection. The patient understands that monitoring is required while taking this medication. Zoryve Counseling:  I discussed with the patient that Zoryve is not for use in the eyes, mouth or vagina. The most commonly reported side effects include diarrhea, headache, insomnia, application site pain, upper respiratory tract infections, and urinary tract infections.  All of the patient's questions and concerns were addressed. Hydroquinone Counseling:  Patient advised that medication may result in skin irritation, lightening (hypopigmentation), dryness, and burning.  In the event of skin irritation, the patient was advised to reduce the amount of the drug applied or use it less frequently.  Rarely, spots that are treated with hydroquinone can become darker (pseudoochronosis).  Should this occur, patient instructed to stop medication and call the office. The patient verbalized understanding of the proper use and possible adverse effects of hydroquinone.  All of the patient's questions and concerns were addressed. Topical Steroids Applications Pregnancy And Lactation Text: Most topical steroids are considered safe to use during pregnancy and lactation.  Any topical steroid applied to the breast or nipple should be washed off before breastfeeding. Taltz Counseling: I discussed with the patient the risks of ixekizumab including but not limited to immunosuppression, serious infections, worsening of inflammatory bowel disease and drug reactions.  The patient understands that monitoring is required including a PPD at baseline and must alert us or the primary physician if symptoms of infection or other concerning signs are noted. Clindamycin Counseling: I counseled the patient regarding use of clindamycin as an antibiotic for prophylactic and/or therapeutic purposes. Clindamycin is active against numerous classes of bacteria, including skin bacteria. Side effects may include nausea, diarrhea, gastrointestinal upset, rash, hives, yeast infections, and in rare cases, colitis. Cibinqo Pregnancy And Lactation Text: It is unknown if this medication will adversely affect pregnancy or breast feeding.  You should not take this medication if you are currently pregnant or planning a pregnancy or while breastfeeding. Topical Clindamycin Counseling: Patient counseled that this medication may cause skin irritation or allergic reactions.  In the event of skin irritation, the patient was advised to reduce the amount of the drug applied or use it less frequently.   The patient verbalized understanding of the proper use and possible adverse effects of clindamycin.  All of the patient's questions and concerns were addressed. Fluconazole Counseling:  Patient counseled regarding adverse effects of fluconazole including but not limited to headache, diarrhea, nausea, upset stomach, liver function test abnormalities, taste disturbance, and stomach pain.  There is a rare possibility of liver failure that can occur when taking fluconazole.  The patient understands that monitoring of LFTs and kidney function test may be required, especially at baseline. The patient verbalized understanding of the proper use and possible adverse effects of fluconazole.  All of the patient's questions and concerns were addressed. Ivermectin Counseling:  Patient instructed to take medication on an empty stomach with a full glass of water.  Patient informed of potential adverse effects including but not limited to nausea, diarrhea, dizziness, itching, and swelling of the extremities or lymph nodes.  The patient verbalized understanding of the proper use and possible adverse effects of ivermectin.  All of the patient's questions and concerns were addressed. Siliq Pregnancy And Lactation Text: The risk during pregnancy and breastfeeding is uncertain with this medication. Hydroxychloroquine Counseling:  I discussed with the patient that a baseline ophthalmologic exam is needed at the start of therapy and every year thereafter while on therapy. A CBC may also be warranted for monitoring.  The side effects of this medication were discussed with the patient, including but not limited to agranulocytosis, aplastic anemia, seizures, rashes, retinopathy, and liver toxicity. Patient instructed to call the office should any adverse effect occur.  The patient verbalized understanding of the proper use and possible adverse effects of Plaquenil.  All the patient's questions and concerns were addressed. Cantharidin Counseling:  I discussed with the patient the risks of Cantharidin including but not limited to pain, redness, burning, itching, and blistering. Libtayo Pregnancy And Lactation Text: This medication is contraindicated in pregnancy and when breast feeding. Aklief Pregnancy And Lactation Text: It is unknown if this medication is safe to use during pregnancy.  It is unknown if this medication is excreted in breast milk.  Breastfeeding women should use the topical cream on the smallest area of the skin for the shortest time needed while breastfeeding.  Do not apply to nipple and areola. Hyrimoz Counseling:  I discussed with the patient the risks of adalimumab including but not limited to myelosuppression, immunosuppression, autoimmune hepatitis, demyelinating diseases, lymphoma, and serious infections.  The patient understands that monitoring is required including a PPD at baseline and must alert us or the primary physician if symptoms of infection or other concerning signs are noted. Sski Pregnancy And Lactation Text: This medication is Pregnancy Category D and isn't considered safe during pregnancy. It is excreted in breast milk. Cantharidin Pregnancy And Lactation Text: This medication has not been proven safe during pregnancy. It is unknown if this medication is excreted in breast milk. Cyclophosphamide Pregnancy And Lactation Text: This medication is Pregnancy Category D and it isn't considered safe during pregnancy. This medication is excreted in breast milk. Opzelura Counseling:  I discussed with the patient the risks of Opzelura including but not limited to nasopharngitis, bronchitis, ear infection, eosinophila, hives, diarrhea, folliculitis, tonsillitis, and rhinorrhea.  Taken orally, this medication has been linked to serious infections; higher rate of mortality; malignancy and lymphoproliferative disorders; major adverse cardiovascular events; thrombosis; thrombocytopenia, anemia, and neutropenia; and lipid elevations. Birth Control Pills Counseling: Birth Control Pill Counseling: I discussed with the patient the potential side effects of OCPs including but not limited to increased risk of stroke, heart attack, thrombophlebitis, deep venous thrombosis, hepatic adenomas, breast changes, GI upset, headaches, and depression.  The patient verbalized understanding of the proper use and possible adverse effects of OCPs. All of the patient's questions and concerns were addressed. Fluconazole Pregnancy And Lactation Text: This medication is Pregnancy Category C and it isn't know if it is safe during pregnancy. It is also excreted in breast milk. Topical Sulfur Applications Counseling: Topical Sulfur Counseling: Patient counseled that this medication may cause skin irritation or allergic reactions.  In the event of skin irritation, the patient was advised to reduce the amount of the drug applied or use it less frequently.   The patient verbalized understanding of the proper use and possible adverse effects of topical sulfur application.  All of the patient's questions and concerns were addressed. Litfulo Counseling: I discussed with the patient the risks of Litfulo therapy including but not limited to upper respiratory tract infections, shingles, cold sores, and nausea. Live vaccines should be avoided.  This medication has been linked to serious infections; higher rate of mortality; malignancy and lymphoproliferative disorders; major adverse cardiovascular events; thrombosis; gastrointestinal perforations; neutropenia; lymphopenia; anemia; liver enzyme elevations; and lipid elevations. Acitretin Pregnancy And Lactation Text: This medication is Pregnancy Category X and should not be given to women who are pregnant or may become pregnant in the future. This medication is excreted in breast milk. Quinolones Counseling:  I discussed with the patient the risks of fluoroquinolones including but not limited to GI upset, allergic reaction, drug rash, diarrhea, dizziness, photosensitivity, yeast infections, liver function test abnormalities, tendonitis/tendon rupture. Oral Minoxidil Counseling- I discussed with the patient the risks of oral minoxidil including but not limited to shortness of breath, swelling of the feet or ankles, dizziness, lightheadedness, unwanted hair growth and allergic reaction.  The patient verbalized understanding of the proper use and possible adverse effects of oral minoxidil.  All of the patient's questions and concerns were addressed. Simponi Counseling:  I discussed with the patient the risks of golimumab including but not limited to myelosuppression, immunosuppression, autoimmune hepatitis, demyelinating diseases, lymphoma, and serious infections.  The patient understands that monitoring is required including a PPD at baseline and must alert us or the primary physician if symptoms of infection or other concerning signs are noted. Odomzo Counseling- I discussed with the patient the risks of Odomzo including but not limited to nausea, vomiting, diarrhea, constipation, weight loss, changes in the sense of taste, decreased appetite, muscle spasms, and hair loss.  The patient verbalized understanding of the proper use and possible adverse effects of Odomzo.  All of the patient's questions and concerns were addressed. Hydroxychloroquine Pregnancy And Lactation Text: This medication has been shown to cause fetal harm but it isn't assigned a Pregnancy Risk Category. There are small amounts excreted in breast milk. Cimetidine Counseling:  I discussed with the patient the risks of Cimetidine including but not limited to gynecomastia, headache, diarrhea, nausea, drowsiness, arrhythmias, pancreatitis, skin rashes, psychosis, bone marrow suppression and kidney toxicity. Clindamycin Pregnancy And Lactation Text: This medication can be used in pregnancy if certain situations. Clindamycin is also present in breast milk. 5-Fu Counseling: 5-Fluorouracil Counseling:  I discussed with the patient the risks of 5-fluorouracil including but not limited to erythema, scaling, itching, weeping, crusting, and pain. Thalidomide Counseling: I discussed with the patient the risks of thalidomide including but not limited to birth defects, anxiety, weakness, chest pain, dizziness, cough and severe allergy. Cyclosporine Counseling:  I discussed with the patient the risks of cyclosporine including but not limited to hypertension, gingival hyperplasia,myelosuppression, immunosuppression, liver damage, kidney damage, neurotoxicity, lymphoma, and serious infections. The patient understands that monitoring is required including baseline blood pressure, CBC, CMP, lipid panel and uric acid, and then 1-2 times monthly CMP and blood pressure. Opzelura Pregnancy And Lactation Text: There is insufficient data to evaluate drug-associated risk for major birth defects, miscarriage, or other adverse maternal or fetal outcomes.  There is a pregnancy registry that monitors pregnancy outcomes in pregnant persons exposed to the medication during pregnancy.  It is unknown if this medication is excreted in breast milk.  Do not breastfeed during treatment and for about 4 weeks after the last dose. Azelaic Acid Counseling: Patient counseled that medicine may cause skin irritation and to avoid applying near the eyes.  In the event of skin irritation, the patient was advised to reduce the amount of the drug applied or use it less frequently.   The patient verbalized understanding of the proper use and possible adverse effects of azelaic acid.  All of the patient's questions and concerns were addressed. Birth Control Pills Pregnancy And Lactation Text: This medication should be avoided if pregnant and for the first 30 days post-partum. Solaraze Counseling:  I discussed with the patient the risks of Solaraze including but not limited to erythema, scaling, itching, weeping, crusting, and pain. Colchicine Counseling:  Patient counseled regarding adverse effects including but not limited to stomach upset (nausea, vomiting, stomach pain, or diarrhea).  Patient instructed to limit alcohol consumption while taking this medication.  Colchicine may reduce blood counts especially with prolonged use.  The patient understands that monitoring of kidney function and blood counts may be required, especially at baseline. The patient verbalized understanding of the proper use and possible adverse effects of colchicine.  All of the patient's questions and concerns were addressed. Litfulo Pregnancy And Lactation Text: Based on animal studies, Lifulo may cause embryo-fetal harm when administered to pregnant women.  The medication should not be used in pregnancy.  Breastfeeding is not recommended during treatment. Oral Minoxidil Pregnancy And Lactation Text: This medication should only be used when clearly needed if you are pregnant, attempting to become pregnant or breast feeding. Zyclara Counseling:  I discussed with the patient the risks of imiquimod including but not limited to erythema, scaling, itching, weeping, crusting, and pain.  Patient understands that the inflammatory response to imiquimod is variable from person to person and was educated regarded proper titration schedule.  If flu-like symptoms develop, patient knows to discontinue the medication and contact us. Dupixent Counseling: I discussed with the patient the risks of dupilumab including but not limited to eye inflammation and irritation, cold sores, injection site reactions, allergic reactions and increased risk of parasitic infection. The patient understands that monitoring is required and they must alert us or the primary physician if symptoms of infection or other concerning signs are noted. Bexarotene Counseling:  I discussed with the patient the risks of bexarotene including but not limited to hair loss, dry lips/skin/eyes, liver abnormalities, hyperlipidemia, pancreatitis, depression/suicidal ideation, photosensitivity, drug rash/allergic reactions, hypothyroidism, anemia, leukopenia, infection, cataracts, and teratogenicity.  Patient understands that they will need regular blood tests to check lipid profile, liver function tests, white blood cell count, thyroid function tests and pregnancy test if applicable. Low Dose Naltrexone Counseling- I discussed with the patient the potential risks and side effects of low dose naltrexone including but not limited to: more vivid dreams, headaches, nausea, vomiting, abdominal pain, fatigue, dizziness, and anxiety. Tremfya Counseling: I discussed with the patient the risks of guselkumab including but not limited to immunosuppression, serious infections, and drug reactions.  The patient understands that monitoring is required including a PPD at baseline and must alert us or the primary physician if symptoms of infection or other concerning signs are noted. Imiquimod Counseling:  I discussed with the patient the risks of imiquimod including but not limited to erythema, scaling, itching, weeping, crusting, and pain.  Patient understands that the inflammatory response to imiquimod is variable from person to person and was educated regarded proper titration schedule.  If flu-like symptoms develop, patient knows to discontinue the medication and contact us. Griseofulvin Counseling:  I discussed with the patient the risks of griseofulvin including but not limited to photosensitivity, cytopenia, liver damage, nausea/vomiting and severe allergy.  The patient understands that this medication is best absorbed when taken with a fatty meal (e.g., ice cream or french fries). Doxycycline Counseling:  Patient counseled regarding possible photosensitivity and increased risk for sunburn.  Patient instructed to avoid sunlight, if possible.  When exposed to sunlight, patients should wear protective clothing, sunglasses, and sunscreen.  The patient was instructed to call the office immediately if the following severe adverse effects occur:  hearing changes, easy bruising/bleeding, severe headache, or vision changes.  The patient verbalized understanding of the proper use and possible adverse effects of doxycycline.  All of the patient's questions and concerns were addressed. Topical Sulfur Applications Pregnancy And Lactation Text: This medication is considered safe during pregnancy and breast feeding secondary to limited systemic absorption. Spironolactone Counseling: Patient advised regarding risks of diarrhea, abdominal pain, hyperkalemia, birth defects (for female patients), liver toxicity and renal toxicity. The patient may need blood work to monitor liver and kidney function and potassium levels while on therapy. The patient verbalized understanding of the proper use and possible adverse effects of spironolactone.  All of the patient's questions and concerns were addressed. Rinvoq Counseling: I discussed with the patient the risks of Rinvoq therapy including but not limited to upper respiratory tract infections, shingles, cold sores, bronchitis, nausea, cough, fever, acne, and headache. Live vaccines should be avoided.  This medication has been linked to serious infections; higher rate of mortality; malignancy and lymphoproliferative disorders; major adverse cardiovascular events; thrombosis; thrombocytopenia, anemia, and neutropenia; lipid elevations; liver enzyme elevations; and gastrointestinal perforations. Solaraze Pregnancy And Lactation Text: This medication is Pregnancy Category B and is considered safe. There is some data to suggest avoiding during the third trimester. It is unknown if this medication is excreted in breast milk. Ilumya Counseling: I discussed with the patient the risks of tildrakizumab including but not limited to immunosuppression, malignancy, posterior leukoencephalopathy syndrome, and serious infections.  The patient understands that monitoring is required including a PPD at baseline and must alert us or the primary physician if symptoms of infection or other concerning signs are noted. Bexarotene Pregnancy And Lactation Text: This medication is Pregnancy Category X and should not be given to women who are pregnant or may become pregnant. This medication should not be used if you are breast feeding. Adbry Counseling: I discussed with the patient the risks of tralokinumab including but not limited to eye infection and irritation, cold sores, injection site reactions, worsening of asthma, allergic reactions and increased risk of parasitic infection.  Live vaccines should be avoided while taking tralokinumab. The patient understands that monitoring is required and they must alert us or the primary physician if symptoms of infection or other concerning signs are noted. Picato Counseling:  I discussed with the patient the risks of Picato including but not limited to erythema, scaling, itching, weeping, crusting, and pain. Azelaic Acid Pregnancy And Lactation Text: This medication is considered safe during pregnancy and breast feeding. Dupixent Pregnancy And Lactation Text: This medication likely crosses the placenta but the risk for the fetus is uncertain. This medication is excreted in breast milk. Doxycycline Pregnancy And Lactation Text: This medication is Pregnancy Category D and not consider safe during pregnancy. It is also excreted in breast milk but is considered safe for shorter treatment courses. Wartpeel Counseling:  I discussed with the patient the risks of Wartpeel including but not limited to erythema, scaling, itching, weeping, crusting, and pain. Otezla Counseling: The side effects of Otezla were discussed with the patient, including but not limited to worsening or new depression, weight loss, diarrhea, nausea, upper respiratory tract infection, and headache. Patient instructed to call the office should any adverse effect occur.  The patient verbalized understanding of the proper use and possible adverse effects of Otezla.  All the patient's questions and concerns were addressed. Rifampin Counseling: I discussed with the patient the risks of rifampin including but not limited to liver damage, kidney damage, red-orange body fluids, nausea/vomiting and severe allergy. Griseofulvin Pregnancy And Lactation Text: This medication is Pregnancy Category X and is known to cause serious birth defects. It is unknown if this medication is excreted in breast milk but breast feeding should be avoided. Olumiant Counseling: I discussed with the patient the risks of Olumiant therapy including but not limited to upper respiratory tract infections, shingles, cold sores, and nausea. Live vaccines should be avoided.  This medication has been linked to serious infections; higher rate of mortality; malignancy and lymphoproliferative disorders; major adverse cardiovascular events; thrombosis; gastrointestinal perforations; neutropenia; lymphopenia; anemia; liver enzyme elevations; and lipid elevations. Soolantra Counseling: I discussed with the patients the risks of topial Soolantra. This is a medicine which decreases the number of mites and inflammation in the skin. You experience burning, stinging, eye irritation or allergic reactions.  Please call our office if you develop any problems from using this medication. Drysol Counseling:  I discussed with the patient the risks of drysol/aluminum chloride including but not limited to skin rash, itching, irritation, burning. Topical Clindamycin Pregnancy And Lactation Text: This medication is Pregnancy Category B and is considered safe during pregnancy. It is unknown if it is excreted in breast milk. Doxepin Counseling:  Patient advised that the medication is sedating and not to drive a car after taking this medication. Patient informed of potential adverse effects including but not limited to dry mouth, urinary retention, and blurry vision.  The patient verbalized understanding of the proper use and possible adverse effects of doxepin.  All of the patient's questions and concerns were addressed. Low Dose Naltrexone Pregnancy And Lactation Text: Naltrexone is pregnancy category C.  There have been no adequate and well-controlled studies in pregnant women.  It should be used in pregnancy only if the potential benefit justifies the potential risk to the fetus.   Limited data indicates that naltrexone is minimally excreted into breastmilk. Rinvoq Pregnancy And Lactation Text: Based on animal studies, Rinvoq may cause embryo-fetal harm when administered to pregnant women.  The medication should not be used in pregnancy.  Breastfeeding is not recommended during treatment and for 6 days after the last dose. Tranexamic Acid Counseling:  Patient advised of the small risk of bleeding problems with tranexamic acid. They were also instructed to call if they developed any nausea, vomiting or diarrhea. All of the patient's questions and concerns were addressed. Dapsone Counseling: I discussed with the patient the risks of dapsone including but not limited to hemolytic anemia, agranulocytosis, rashes, methemoglobinemia, kidney failure, peripheral neuropathy, headaches, GI upset, and liver toxicity.  Patients who start dapsone require monitoring including baseline LFTs and weekly CBCs for the first month, then every month thereafter.  The patient verbalized understanding of the proper use and possible adverse effects of dapsone.  All of the patient's questions and concerns were addressed. Skyrizi Counseling: I discussed with the patient the risks of risankizumab-rzaa including but not limited to immunosuppression, and serious infections.  The patient understands that monitoring is required including a PPD at baseline and must alert us or the primary physician if symptoms of infection or other concerning signs are noted. Azithromycin Counseling:  I discussed with the patient the risks of azithromycin including but not limited to GI upset, allergic reaction, drug rash, diarrhea, and yeast infections. Benzoyl Peroxide Counseling: Patient counseled that medicine may cause skin irritation and bleach clothing.  In the event of skin irritation, the patient was advised to reduce the amount of the drug applied or use it less frequently.   The patient verbalized understanding of the proper use and possible adverse effects of benzoyl peroxide.  All of the patient's questions and concerns were addressed. Spironolactone Pregnancy And Lactation Text: This medication can cause feminization of the male fetus and should be avoided during pregnancy. The active metabolite is also found in breast milk. Itraconazole Counseling:  I discussed with the patient the risks of itraconazole including but not limited to liver damage, nausea/vomiting, neuropathy, and severe allergy.  The patient understands that this medication is best absorbed when taken with acidic beverages such as non-diet cola or ginger ale.  The patient understands that monitoring is required including baseline LFTs and repeat LFTs at intervals.  The patient understands that they are to contact us or the primary physician if concerning signs are noted. Otezla Pregnancy And Lactation Text: This medication is Pregnancy Category C and it isn't known if it is safe during pregnancy. It is unknown if it is excreted in breast milk. Dutasteride Male Counseling: Dutasteride Counseling:  I discussed with the patient the risks of use of dutasteride including but not limited to decreased libido, decreased ejaculate volume, and gynecomastia. Women who can become pregnant should not handle medication.  All of the patient's questions and concerns were addressed. Klisyri Counseling:  I discussed with the patient the risks of Klisyri including but not limited to erythema, scaling, itching, weeping, crusting, and pain. Olumiant Pregnancy And Lactation Text: Based on animal studies, Olumiant may cause embryo-fetal harm when administered to pregnant women.  The medication should not be used in pregnancy.  Breastfeeding is not recommended during treatment. Isotretinoin Counseling: Patient should get monthly blood tests, not donate blood, not drive at night if vision affected, not share medication, and not undergo elective surgery for 6 months after tx completed. Side effects reviewed, pt to contact office should one occur. Methotrexate Counseling:  Patient counseled regarding adverse effects of methotrexate including but not limited to nausea, vomiting, abnormalities in liver function tests. Patients may develop mouth sores, rash, diarrhea, and abnormalities in blood counts. The patient understands that monitoring is required including LFT's and blood counts.  There is a rare possibility of scarring of the liver and lung problems that can occur when taking methotrexate. Persistent nausea, loss of appetite, pale stools, dark urine, cough, and shortness of breath should be reported immediately. Patient advised to discontinue methotrexate treatment at least three months before attempting to become pregnant.  I discussed the need for folate supplements while taking methotrexate.  These supplements can decrease side effects during methotrexate treatment. The patient verbalized understanding of the proper use and possible adverse effects of methotrexate.  All of the patient's questions and concerns were addressed. Rifampin Pregnancy And Lactation Text: This medication is Pregnancy Category C and it isn't know if it is safe during pregnancy. It is also excreted in breast milk and should not be used if you are breast feeding. Doxepin Pregnancy And Lactation Text: This medication is Pregnancy Category C and it isn't known if it is safe during pregnancy. It is also excreted in breast milk and breast feeding isn't recommended. Opioid Counseling: I discussed with the patient the potential side effects of opioids including but not limited to addiction, altered mental status, and depression. I stressed avoiding alcohol, benzodiazepines, muscle relaxants and sleep aids unless specifically okayed by a physician. The patient verbalized understanding of the proper use and possible adverse effects of opioids. All of the patient's questions and concerns were addressed. They were instructed to flush the remaining pills down the toilet if they did not need them for pain. Erythromycin Counseling:  I discussed with the patient the risks of erythromycin including but not limited to GI upset, allergic reaction, drug rash, diarrhea, increase in liver enzymes, and yeast infections. Niacinamide Counseling: I recommended taking niacin or niacinamide, also know as vitamin B3, twice daily. Recent evidence suggests that taking vitamin B3 (500 mg twice daily) can reduce the risk of actinic keratoses and non-melanoma skin cancers. Side effects of vitamin B3 include flushing and headache. Adbry Pregnancy And Lactation Text: It is unknown if this medication will adversely affect pregnancy or breast feeding. Xolair Counseling:  Patient informed of potential adverse effects including but not limited to fever, muscle aches, rash and allergic reactions.  The patient verbalized understanding of the proper use and possible adverse effects of Xolair.  All of the patient's questions and concerns were addressed. Tranexamic Acid Pregnancy And Lactation Text: It is unknown if this medication is safe during pregnancy or breast feeding. Dapsone Pregnancy And Lactation Text: This medication is Pregnancy Category C and is not considered safe during pregnancy or breast feeding. Benzoyl Peroxide Pregnancy And Lactation Text: This medication is Pregnancy Category C. It is unknown if benzoyl peroxide is excreted in breast milk. Soolantra Pregnancy And Lactation Text: This medication is Pregnancy Category C. This medication is considered safe during breast feeding. Topical Ketoconazole Counseling: Patient counseled that this medication may cause skin irritation or allergic reactions.  In the event of skin irritation, the patient was advised to reduce the amount of the drug applied or use it less frequently.   The patient verbalized understanding of the proper use and possible adverse effects of ketoconazole.  All of the patient's questions and concerns were addressed. Azithromycin Pregnancy And Lactation Text: This medication is considered safe during pregnancy and is also secreted in breast milk. Sarecycline Counseling: Patient advised regarding possible photosensitivity and discoloration of the teeth, skin, lips, tongue and gums.  Patient instructed to avoid sunlight, if possible.  When exposed to sunlight, patients should wear protective clothing, sunglasses, and sunscreen.  The patient was instructed to call the office immediately if the following severe adverse effects occur:  hearing changes, easy bruising/bleeding, severe headache, or vision changes.  The patient verbalized understanding of the proper use and possible adverse effects of sarecycline.  All of the patient's questions and concerns were addressed. Dutasteride Female Counseling: Dutasteride Counseling:  I discussed with the patient the risks of use of dutasteride including but not limited to decreased libido and sexual dysfunction. Explained the teratogenic nature of the medication and stressed the importance of not getting pregnant during treatment. All of the patient's questions and concerns were addressed. Methotrexate Pregnancy And Lactation Text: This medication is Pregnancy Category X and is known to cause fetal harm. This medication is excreted in breast milk. Infliximab Counseling:  I discussed with the patient the risks of infliximab including but not limited to myelosuppression, immunosuppression, autoimmune hepatitis, demyelinating diseases, lymphoma, and serious infections.  The patient understands that monitoring is required including a PPD at baseline and must alert us or the primary physician if symptoms of infection or other concerning signs are noted. Sotyktu Counseling:  I discussed the most common side effects of Sotyktu including: common cold, sore throat, sinus infections, cold sores, canker sores, folliculitis, and acne.  I also discussed more serious side effects of Sotyktu including but not limited to: serious allergic reactions; increased risk for infections such as TB; cancers such as lymphomas; rhabdomyolysis and elevated CPK; and elevated triglycerides and liver enzymes.  Protopic Counseling: Patient may experience a mild burning sensation during topical application. Protopic is not approved in children less than 2 years of age. There have been case reports of hematologic and skin malignancies in patients using topical calcineurin inhibitors although causality is questionable. Niacinamide Pregnancy And Lactation Text: These medications are considered safe during pregnancy. Winlevi Counseling:  I discussed with the patient the risks of topical clascoterone including but not limited to erythema, scaling, itching, and stinging. Patient voiced their understanding. Xolair Pregnancy And Lactation Text: This medication is Pregnancy Category B and is considered safe during pregnancy. This medication is excreted in breast milk. Klisyri Pregnancy And Lactation Text: It is unknown if this medication can harm a developing fetus or if it is excreted in breast milk. Isotretinoin Pregnancy And Lactation Text: This medication is Pregnancy Category X and is considered extremely dangerous during pregnancy. It is unknown if it is excreted in breast milk. Bimzelx Counseling:  I discussed with the patient the risks of Bimzelx including but not limited to depression, immunosuppression, allergic reactions and infections.  The patient understands that monitoring is required including a PPD at baseline and must alert us or the primary physician if symptoms of infection or other concerning signs are noted. Oxybutynin Counseling:  I discussed with the patient the risks of oxybutynin including but not limited to skin rash, drowsiness, dry mouth, difficulty urinating, and blurred vision. Azathioprine Counseling:  I discussed with the patient the risks of azathioprine including but not limited to myelosuppression, immunosuppression, hepatotoxicity, lymphoma, and infections.  The patient understands that monitoring is required including baseline LFTs, Creatinine, possible TPMP genotyping and weekly CBCs for the first month and then every 2 weeks thereafter.  The patient verbalized understanding of the proper use and possible adverse effects of azathioprine.  All of the patient's questions and concerns were addressed. Spevigo Counseling: I discussed with the patient the risks of Spevigo including but not limited to fatigue, nasuea, vomiting, headache, pruritus, urinary tract infection, an infusion related reactions.  The patient understands that monitoring is required including screening for tuberculosis at baseline and yearly screening thereafter while continuing Spevigo therapy. They should contact us if symptoms of infection or other concerning signs are noted. Bactrim Counseling:  I discussed with the patient the risks of sulfa antibiotics including but not limited to GI upset, allergic reaction, drug rash, diarrhea, dizziness, photosensitivity, and yeast infections.  Rarely, more serious reactions can occur including but not limited to aplastic anemia, agranulocytosis, methemoglobinemia, blood dyscrasias, liver or kidney failure, lung infiltrates or desquamative/blistering drug rashes. Hydroxyzine Counseling: Patient advised that the medication is sedating and not to drive a car after taking this medication.  Patient informed of potential adverse effects including but not limited to dry mouth, urinary retention, and blurry vision.  The patient verbalized understanding of the proper use and possible adverse effects of hydroxyzine.  All of the patient's questions and concerns were addressed. Erythromycin Pregnancy And Lactation Text: This medication is Pregnancy Category B and is considered safe during pregnancy. It is also excreted in breast milk. Elidel Counseling: Patient may experience a mild burning sensation during topical application. Elidel is not approved in children less than 2 years of age. There have been case reports of hematologic and skin malignancies in patients using topical calcineurin inhibitors although causality is questionable. Topical Retinoid counseling:  Patient advised to apply a pea-sized amount only at bedtime and wait 30 minutes after washing their face before applying.  If too drying, patient may add a non-comedogenic moisturizer. The patient verbalized understanding of the proper use and possible adverse effects of retinoids.  All of the patient's questions and concerns were addressed. Opioid Pregnancy And Lactation Text: These medications can lead to premature delivery and should be avoided during pregnancy. These medications are also present in breast milk in small amounts. Valtrex Counseling: I discussed with the patient the risks of valacyclovir including but not limited to kidney damage, nausea, vomiting and severe allergy.  The patient understands that if the infection seems to be worsening or is not improving, they are to call. Prednisone Counseling:  I discussed with the patient the risks of prolonged use of prednisone including but not limited to weight gain, insomnia, osteoporosis, mood changes, diabetes, susceptibility to infection, glaucoma and high blood pressure.  In cases where prednisone use is prolonged, patients should be monitored with blood pressure checks, serum glucose levels and an eye exam.  Additionally, the patient may need to be placed on GI prophylaxis, PCP prophylaxis, and calcium and vitamin D supplementation and/or a bisphosphonate.  The patient verbalized understanding of the proper use and the possible adverse effects of prednisone.  All of the patient's questions and concerns were addressed. Protopic Pregnancy And Lactation Text: This medication is Pregnancy Category C. It is unknown if this medication is excreted in breast milk when applied topically. Carac Counseling:  I discussed with the patient the risks of Carac including but not limited to erythema, scaling, itching, weeping, crusting, and pain. Gabapentin Counseling: I discussed with the patient the risks of gabapentin including but not limited to dizziness, somnolence, fatigue and ataxia. Sotyktu Pregnancy And Lactation Text: There is insufficient data to evaluate whether or not Sotyktu is safe to use during pregnancy.   It is not known if Sotyktu passes into breast milk and whether or not it is safe to use when breastfeeding.   High Dose Vitamin A Counseling: Side effects reviewed, pt to contact office should one occur. Bimzelx Pregnancy And Lactation Text: This medication crosses the placenta and the safety is uncertain during pregnancy. It is unknown if this medication is present in breast milk. Enbrel Counseling:  I discussed with the patient the risks of etanercept including but not limited to myelosuppression, immunosuppression, autoimmune hepatitis, demyelinating diseases, lymphoma, and infections.  The patient understands that monitoring is required including a PPD at baseline and must alert us or the primary physician if symptoms of infection or other concerning signs are noted. Minoxidil Counseling: Minoxidil is a topical medication which can increase blood flow where it is applied. It is uncertain how this medication increases hair growth. Side effects are uncommon and include stinging and allergic reactions. Dutasteride Pregnancy And Lactation Text: This medication is absolutely contraindicated in women, especially during pregnancy and breast feeding. Feminization of male fetuses is possible if taking while pregnant.

## 2024-12-18 ENCOUNTER — LAB ENCOUNTER (OUTPATIENT)
Dept: LAB | Age: 44
End: 2024-12-18
Attending: Other
Payer: COMMERCIAL

## 2024-12-18 ENCOUNTER — HOSPITAL ENCOUNTER (OUTPATIENT)
Age: 44
Discharge: HOME | End: 2024-12-18
Payer: COMMERCIAL

## 2024-12-18 ENCOUNTER — APPOINTMENT (OUTPATIENT)
Dept: GENERAL RADIOLOGY | Age: 44
End: 2024-12-18
Attending: NURSE PRACTITIONER
Payer: COMMERCIAL

## 2024-12-18 VITALS
HEART RATE: 81 BPM | OXYGEN SATURATION: 99 % | DIASTOLIC BLOOD PRESSURE: 100 MMHG | RESPIRATION RATE: 18 BRPM | TEMPERATURE: 98 F | WEIGHT: 250 LBS | HEIGHT: 70 IN | BODY MASS INDEX: 35.79 KG/M2 | SYSTOLIC BLOOD PRESSURE: 157 MMHG

## 2024-12-18 DIAGNOSIS — M79.675 GREAT TOE PAIN, LEFT: Primary | ICD-10-CM

## 2024-12-18 DIAGNOSIS — R41.3 MEMORY LOSS: ICD-10-CM

## 2024-12-18 LAB
FOLATE SERPL-MCNC: 19 NG/ML (ref 5.4–?)
VIT B12 SERPL-MCNC: 612 PG/ML (ref 211–911)

## 2024-12-18 PROCEDURE — 82746 ASSAY OF FOLIC ACID SERUM: CPT

## 2024-12-18 PROCEDURE — 36415 COLL VENOUS BLD VENIPUNCTURE: CPT

## 2024-12-18 PROCEDURE — 99203 OFFICE O/P NEW LOW 30 MIN: CPT

## 2024-12-18 PROCEDURE — 82607 VITAMIN B-12: CPT

## 2024-12-18 PROCEDURE — 99213 OFFICE O/P EST LOW 20 MIN: CPT

## 2024-12-18 PROCEDURE — 73630 X-RAY EXAM OF FOOT: CPT | Performed by: NURSE PRACTITIONER

## 2024-12-18 RX ORDER — METHYLPREDNISOLONE 4 MG/1
TABLET ORAL
Qty: 1 EACH | Refills: 0 | Status: SHIPPED | OUTPATIENT
Start: 2024-12-18

## 2024-12-18 RX ORDER — HYDROCODONE BITARTRATE AND ACETAMINOPHEN 5; 325 MG/1; MG/1
1 TABLET ORAL EVERY 6 HOURS PRN
Qty: 10 TABLET | Refills: 0 | Status: SHIPPED | OUTPATIENT
Start: 2024-12-18

## 2024-12-18 NOTE — ED PROVIDER NOTES
Patient Seen in: [facility]      History   No chief complaint on file.    Stated Complaint: Ankle pain    Subjective:   44-year-old male presents today with pain to the base of the left great toe.  Does have swelling to the area.  Denies any injury.  No history of gout.  Increased pain with ambulation.  States hurts to touch it at all.  No other symptoms or concerns.  The patient's medication list, past medical history and social history elements as listed in today's nurse's notes were reviewed and agreed (except as otherwise stated in the HPI).  The patient's family history reviewed and determined to be noncontributory to the presenting problem              Objective:     Past Medical History:    Acute, but ill-defined, cerebrovascular disease    MALU (acute kidney injury) ([facility])    Chest pain    Class 2 severe obesity due to excess calories with serious comorbidity and body mass index (BMI) of 37.0 to 37.9 in adult ([facility])    Class 2 severe obesity due to excess calories with serious comorbidity and body mass index (BMI) of 37.0 to 37.9 in adult ([facility])    Associated with resistant hypertension, mixed hyperlipidemia, RAPHAEL, and history of CVA       Class 2 severe obesity due to excess calories with serious comorbidity and body mass index (BMI) of 38.0 to 38.9 in adult ([facility])    Associate with hypertension, prediabetes, mixed hyperlipidemia, and RAPHAEL    Class 2 severe obesity due to excess calories with serious comorbidity and body mass index (BMI) of 39.0 to 39.9 in adult ([facility])    Decorative tattoo    Disorder of liver    fatty liver    Dizziness    Dyslipidemia    Goal LDL <70     Easy bruising    Essential hypertension, benign    Goal BP <=130/85     Fatigue    Flatulence/gas pain/belching    Focal infarction of brain ([facility])    Food intolerance    Frequent urination    Heartburn    History of ear infection    Hypertensive emergency    Indigestion    Leg swelling    Low HDL (under 40)    Mixed  hyperlipidemia    Goal LDL <70     Resistant hypertension    Goal BP <=130/85     Shortness of breath    Sleep apnea    Sleep disturbance    Stress    Syncope    Visual impairment    glasses    Wears glasses    Weight gain    17 pound weight gain from 3/24/2021 to 10/1/2021              Past Surgical History:   Procedure Laterality Date    Vasectomy  12/4/14    Dr. Horta                Social History     Socioeconomic History    Marital status:    Tobacco Use    Smoking status: Former     Current packs/day: 0.00     Average packs/day: 1.5 packs/day for 14.0 years (21.0 ttl pk-yrs)     Types: Cigarettes     Quit date: 7/1/2014     Years since quitting: 10.4    Smokeless tobacco: Current    Tobacco comments:     pt smoked for 15 years   Vaping Use    Vaping status: Former   Substance and Sexual Activity    Alcohol use: Yes     Alcohol/week: 3.0 standard drinks of alcohol     Types: 3 Standard drinks or equivalent per week     Comment: social on weekends    Drug use: Yes     Types: Cannabis     Comment: Gummies    Sexual activity: Not Currently   Other Topics Concern    Caffeine Concern Yes     Comment: 1 cup coffee    Exercise Yes     Comment: very active at work.     Seat Belt Yes              Review of Systems    Positive for stated complaint: Ankle pain  Other systems are as noted in HPI.  Constitutional and vital signs reviewed.      All other systems reviewed and negative except as noted above.    Physical Exam     ED Triage Vitals [12/18/24 0854]   BP (!) 157/100   Pulse 81   Resp 18   Temp 97.6 °F (36.4 °C)   Temp src Oral   SpO2 99 %   O2 Device None (Room air)       Current Vitals:   Vital Signs  BP: (!) 157/100  Pulse: 81  Resp: 18  Temp: 97.6 °F (36.4 °C)  Temp src: Oral    Oxygen Therapy  SpO2: 99 %  O2 Device: None (Room air)        Physical Exam  Vitals and nursing note reviewed.   Constitutional:       Appearance: Normal appearance.   HENT:      Head: Normocephalic.      Mouth/Throat:       Mouth: Mucous membranes are moist.   Cardiovascular:      Rate and Rhythm: Normal rate.   Pulmonary:      Effort: Pulmonary effort is normal.   Musculoskeletal:      Comments: Patient at the base of the left great toe.  Swelling is noted but no erythema or warmth to touch.  Decreased flexion tension due to pain.   Skin:     General: Skin is warm and dry.   Neurological:      Mental Status: He is alert and oriented to person, place, and time.             ED Course   Labs Reviewed - No data to display           XR FOOT, COMPLETE (MIN 3 VIEWS), LEFT (CPT=73630)    Result Date: 12/18/2024  PROCEDURE:  XR FOOT, COMPLETE (MIN 3 VIEWS), LEFT (CPT=73630)  TECHNIQUE:  AP, oblique, and lateral views were obtained.  COMPARISON:  None.  INDICATIONS:  Ankle pain  PATIENT STATED HISTORY: (As transcribed by Technologist)  Right big toe pain and swelling.  No trauma.    FINDINGS:  There is hallux valgus deformity with bunion formation and mild to moderate degenerative changes at the 1st MTP joint.  No acute fracture or dislocation.  No radiopaque foreign body.  Joint spaces are otherwise maintained.            CONCLUSION:  See above   LOCATION:  [facility]   Dictated by (CST): Bennie Mcgee MD on 12/18/2024 at 9:20 AM     Finalized by (CST): Bennie Mcgee MD on 12/18/2024 at 9:27 AM          MDM     Please note that this report has been produced using speech recognition software and may contain errors related to that system including, but not limited to, errors in grammar, punctuation, and spelling, as well as words and phrases that possibly may have been recognized inappropriately.  If there are any questions or concerns, contact the dictating provider for clarification.              Medical Decision Making  Differential diagnosis includes but is not limited to: Great toe/foot-Contusion, sprain, fracture, osteoarthritis, gouty arthritis      Presents today complaints of pain to the left great toe at the base.  Swelling and pain to  the foot and toe.  Denies any injury.  States did have ankle pain but that is since resolved but admits that was walking different.  Does have a history of bunion to the foot.  X-ray shows no fracture does confirm bunion as well as osteoarthritis.  Do have some concerns for possible gout.  Will give prescription for Medrol Dosepak as well as Norco for acute pain.  Supportive care was discussed.  To follow-up with primary care physician tomorrow as scheduled.  Explained may drawl uric acid although this is not definitive for gout but can be an indicator.  To follow-up with orthopedics if symptoms persist.  Patient verbalized understanding and agreed to plan of care.    Amount and/or Complexity of Data Reviewed  Radiology: ordered and independent interpretation performed. Decision-making details documented in ED Course.     Details: X-ray left foot    Risk  OTC drugs.  Prescription drug management.        Disposition and Plan     Clinical Impression:  1. Great toe pain, left         Disposition:  Discharge  12/18/2024  9:34 am    Follow-up:  Eli Granados DO  [address] [address] [facility] [address]  [facility] [id number] [phone]    In 1 week  As needed          Medications Prescribed:  Current Discharge Medication List        START taking these medications    Details   methylPREDNISolone (MEDROL) 4 MG Oral Tablet Therapy Pack Dosepack: take as directed  Qty: 1 each, Refills: 0      HYDROcodone-acetaminophen 5-325 MG Oral Tab Take 1 tablet by mouth every 6 (six) hours as needed for Pain.  Qty: 10 tablet, Refills: 0    Associated Diagnoses: Great toe pain, left                 Supplementary Documentation:

## 2024-12-18 NOTE — DISCHARGE INSTRUCTIONS
Discussed pain is related to your bunion/osteoarthritis versus gout.  Follow with your primary care physician as scheduled for further evaluation.  Take Medrol Dosepak as prescribed.  Take over-the-counter anti-inflammatory such as naproxen or ibuprofen as well as Tylenol for pain and swelling.  May try icing and keeping foot elevated as discussed.

## 2024-12-19 ENCOUNTER — OFFICE VISIT (OUTPATIENT)
Dept: FAMILY MEDICINE CLINIC | Facility: CLINIC | Age: 44
End: 2024-12-19
Payer: COMMERCIAL

## 2024-12-19 DIAGNOSIS — M79.675 GREAT TOE PAIN, LEFT: ICD-10-CM

## 2024-12-19 DIAGNOSIS — I25.10 CORONARY ARTERY DISEASE INVOLVING NATIVE CORONARY ARTERY OF NATIVE HEART WITHOUT ANGINA PECTORIS: Primary | ICD-10-CM

## 2024-12-19 DIAGNOSIS — I1A.0 RESISTANT HYPERTENSION: ICD-10-CM

## 2024-12-19 DIAGNOSIS — E66.812 CLASS 2 SEVERE OBESITY DUE TO EXCESS CALORIES WITH SERIOUS COMORBIDITY AND BODY MASS INDEX (BMI) OF 38.0 TO 38.9 IN ADULT (HCC): ICD-10-CM

## 2024-12-19 DIAGNOSIS — Z71.6 ENCOUNTER FOR TOBACCO USE CESSATION COUNSELING: ICD-10-CM

## 2024-12-19 DIAGNOSIS — E66.01 CLASS 2 SEVERE OBESITY DUE TO EXCESS CALORIES WITH SERIOUS COMORBIDITY AND BODY MASS INDEX (BMI) OF 38.0 TO 38.9 IN ADULT (HCC): ICD-10-CM

## 2024-12-19 DIAGNOSIS — Z76.89 ENCOUNTER FOR NEW MEDICATION PRESCRIPTION: ICD-10-CM

## 2024-12-19 DIAGNOSIS — I51.5 CARDIAC CALCIFICATION (HCC): ICD-10-CM

## 2024-12-19 PROCEDURE — 99214 OFFICE O/P EST MOD 30 MIN: CPT | Performed by: FAMILY MEDICINE

## 2024-12-19 PROCEDURE — 99406 BEHAV CHNG SMOKING 3-10 MIN: CPT | Performed by: FAMILY MEDICINE

## 2024-12-28 VITALS
RESPIRATION RATE: 15 BRPM | HEIGHT: 70 IN | DIASTOLIC BLOOD PRESSURE: 88 MMHG | BODY MASS INDEX: 38.22 KG/M2 | WEIGHT: 267 LBS | SYSTOLIC BLOOD PRESSURE: 136 MMHG | TEMPERATURE: 98 F | OXYGEN SATURATION: 99 % | HEART RATE: 80 BPM

## 2024-12-28 PROBLEM — I25.10 CORONARY ARTERY DISEASE INVOLVING NATIVE CORONARY ARTERY OF NATIVE HEART WITHOUT ANGINA PECTORIS: Status: ACTIVE | Noted: 2024-12-28

## 2024-12-28 PROBLEM — I51.5 CARDIAC CALCIFICATION (HCC): Status: ACTIVE | Noted: 2024-12-28

## 2024-12-28 PROBLEM — E66.812 CLASS 2 SEVERE OBESITY DUE TO EXCESS CALORIES WITH SERIOUS COMORBIDITY AND BODY MASS INDEX (BMI) OF 38.0 TO 38.9 IN ADULT (HCC): Status: ACTIVE | Noted: 2024-12-28

## 2024-12-28 PROBLEM — E66.01 CLASS 2 SEVERE OBESITY DUE TO EXCESS CALORIES WITH SERIOUS COMORBIDITY AND BODY MASS INDEX (BMI) OF 38.0 TO 38.9 IN ADULT (HCC): Status: ACTIVE | Noted: 2024-12-28

## 2024-12-28 NOTE — PROGRESS NOTES
Tonio Wyatt is a 44 year old male.     Chief Complaint   Patient presents with    Hypertension    Hyperlipidemia    Other     family history of stroke    Smoking     Nicotine pouch         HPI:     Resistant hypertension:  Uncontrolled.   Severity is severe, patient is on max doses of several antihypertensive medications.  Patient currently taking amlodipine 10 mg nightly, carvedilol 25 mg twice a day, hydrochlorothiazide 50 mg daily, olmesartan 40 mg nightly.      CAD/cardiac calcification:  Patient on max dose of atorvastatin, 80 mg nightly, and aspirin 325 mg daily.        Great toe pain:  Left toe.  A little bit better the last 1 or 2 days.        Wt Readings from Last 6 Encounters:   12/19/24 267 lb (121.1 kg)   12/18/24 250 lb (113.4 kg)   09/16/24 262 lb (118.8 kg)   08/20/24 261 lb (118.4 kg)   08/16/24 261 lb (118.4 kg)   06/26/24 259 lb (117.5 kg)      Body mass index is 38.31 kg/m².        Current Outpatient Medications   Medication Sig Dispense Refill    semaglutide-weight management 0.25 MG/0.5ML Subcutaneous Solution Auto-injector Inject 0.5 mL (0.25 mg total) into the skin once a week for 4 doses. 2 mL 0    methylPREDNISolone (MEDROL) 4 MG Oral Tablet Therapy Pack Dosepack: take as directed 1 each 0    HYDROcodone-acetaminophen 5-325 MG Oral Tab Take 1 tablet by mouth every 6 (six) hours as needed for Pain. 10 tablet 0    amLODIPine 10 MG Oral Tab Take 1 tablet (10 mg total) by mouth nightly. 90 tablet 3    atorvastatin 80 MG Oral Tab Take 1 tablet (80 mg total) by mouth nightly. 90 tablet 3    carvedilol 25 MG Oral Tab Take 1 tablet (25 mg total) by mouth 2 (two) times daily with meals. 180 tablet 3    hydroCHLOROthiazide 50 MG Oral Tab Take 1 tablet (50 mg total) by mouth daily. 90 tablet 3    Olmesartan Medoxomil 40 MG Oral Tab Take 1 tablet (40 mg total) by mouth at bedtime. 90 tablet 3    ASPIRIN 325 MG Oral Tab TAKE ONE TABLET BY MOUTH ONE TIME DAILY  90 tablet 0    Cetirizine HCl  (ZYRTEC OR) Take by mouth daily.        Past Medical History:    Acute, but ill-defined, cerebrovascular disease    MALU (acute kidney injury) ([facility])    Chest pain    Class 2 severe obesity due to excess calories with serious comorbidity and body mass index (BMI) of 37.0 to 37.9 in adult (HCC)    Class 2 severe obesity due to excess calories with serious comorbidity and body mass index (BMI) of 37.0 to 37.9 in adult (HCC)    Associated with resistant hypertension, mixed hyperlipidemia, RAPHAEL, and history of CVA       Class 2 severe obesity due to excess calories with serious comorbidity and body mass index (BMI) of 38.0 to 38.9 in adult (HCC)    Associate with hypertension, prediabetes, mixed hyperlipidemia, and RAPHAEL    Class 2 severe obesity due to excess calories with serious comorbidity and body mass index (BMI) of 39.0 to 39.9 in adult ([facility])    Decorative tattoo    Disorder of liver    fatty liver    Dizziness    Dyslipidemia    Goal LDL <70     Easy bruising    Essential hypertension, benign    Goal BP <=130/85     Fatigue    Flatulence/gas pain/belching    Focal 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 Horta      Social History:    Social History     Socioeconomic History    Marital status:    Tobacco Use    Smoking status: Former     Current packs/day: 0.00     Average packs/day: 1.5 packs/day for 14.0 years (21.0 ttl pk-yrs)     Types: Cigarettes     Quit date: 7/1/2014     Years since quitting:  10.5    Smokeless tobacco: Current    Tobacco comments:     pt smoked for 15 years    Vaping Use    Vaping status: Former   Substance and Sexual Activity    Alcohol use: Yes     Alcohol/week: 3.0 standard drinks of alcohol     Types: 3 Standard drinks or equivalent per week     Comment: social on weekends    Drug use: Yes     Types: Cannabis     Comment: Gummies    Sexual activity: Not Currently   Other Topics Concern    Caffeine Concern Yes     Comment: 1 cup coffee    Exercise Yes     Comment: very active at work.     Seat Belt Yes         Family History   Problem Relation Age of Onset    Hypertension Father     Diabetes Father     Hypertension Mother     Asthma Son     Heart Attack Maternal Grandmother     Heart Attack Maternal Grandfather     Heart Attack Paternal Grandmother     Heart Attack Paternal Grandfather      REVIEW OF SYSTEMS:   GENERAL HEALTH: Overall feels well.   SKIN: denies any unusual skin lesions or rashes   RESPIRATORY: Denies: LINDSAY/JOSEPH/Cough  CARDIOVASCULAR: Denies CP/palpitations  VASCULAR: Denies LE edema  Musculoskeletal: As in HPI  GI: Denies abdominal pain/nausea/vomiting/blood in stool/black stool/bloating/constipation/diarrhea  : denies urinary symptoms  NEURO: denies headaches/dizziness  PSYCH: denies depression and anxiety    Immunization History   Administered Date(s) Administered    >=3 YRS TRI  MULTIDOSE VIAL (25743) FLU CLINIC 10/25/2014    TDAP 10/25/2014   Pended Date(s) Pended    Influenza Vaccine Refused 02/21/2024       EXAM:   /88   Pulse 80   Temp 97.7 °F (36.5 °C) (Temporal)   Resp 15   Ht 5' 10\" (1.778 m)   Wt 267 lb (121.1 kg)   SpO2 99%   BMI 38.31 kg/m²   GENERAL: NAD, pleasant obese  male  SKIN: no visible rashes  HEAD: NCAT  LUNGS: CTA A/P no wheezes/ronchi/rales/crackles  CARDIO: RRR, +S1/S2, no mm  VASCULAR: Trace bilateral lower extremity edema  EXTREMITIES: no cyanosis or clubbing  NEURO: Alert and Oriented x3.  No gross motor or gross  sensory abnormalities.  PSYCH: Affect normal.  Normal thought content.        DATA:    PROCEDURE:  CT CALCIUM SCORING     LOCATION:                                           COMPARISON:  None.     INDICATIONS:  Z00.00 Preventative health care     TECHNIQUE:  The patient was placed in the supine position on the multidector CT table at [facility].  EKG Gated was used to minimize cardiac motion. Non contrast 2mm axial cross sectional images were obtained in a narrow field of view to display   heart. Dose reduction techniques were used. Dose information is transmitted to the ACR (American College of Radiology) NRDR (National Radiology Data Registry) which includes the Dose Index Registry. See the Radiologist's over-read for evaluation of   non-cardiac and non-vascular structures.      FINDINGS:    CALCIUM SCORING RESULTS (Volume / Agatston):  LEFT MAIN:    0.00 / 0.00  RCA:      221.47 / 172.89  LAD:      106.45 / 91.69  CIRCUMFLEX:  119.31 / 129.07  TOTAL:    447.23 / 393.65     Your Coronary Artery Calcium Score: 393.65     Clinical Relevance of Coronary Artery Scan Results (may change depending on age and sex of patient.)     Calcium Score  Implication  Risk of Coronary Artery Disease     0  No identifiable plaque  Very low, generally less than 5%     1-10  Minimal identifiable plaque  Very unlikely, less than 10%       Definite, at least mild atherosclerotic plaque  Mild risk, mild coronary atherosclerosis likely     101-400  Definite, at least moderate atherosclerotic plaque  Moderate risk, mild coronary artery disease highly likely,       significant atherosclerosis possible     401-1000  Definite, at least moderate atherosclerotic plaque  High risk, moderate coronary artery disease highly likely     >1000  Definite, moderate to severe atherosclerotic plaque  High risk, moderate to severe coronary artery disease       highly likely     This patient identified you as their physician, if this is not  correct please contact the [facility] at [phone] and they will assign this report to another physician.        Dictated by (CST): José Miguel Ewing MD on 9/25/2022 at 11:44 AM       Finalized by (CST): José Miguel Ewing MD on 9/25/2022 at 11:46 AM              Lab Results   Component Value Date    A1C 5.6 06/21/2024    A1C 5.9 (A) 07/20/2023    A1C 6.0 (A) 05/25/2022     06/21/2024     03/12/2021     12/19/2014     Lab Results   Component Value Date    CHOLEST 137 06/21/2024    CHOLEST 131 05/15/2023    CHOLEST 143 10/27/2022     Lab Results   Component Value Date    HDL 34 (L) 06/21/2024    HDL 38 (L) 05/15/2023    HDL 36 (L) 10/27/2022     Lab Results   Component Value Date    LDL 86 06/21/2024    LDL 72 05/15/2023    LDL 83 10/27/2022     Lab Results   Component Value Date    TRIG 86 06/21/2024    TRIG 114 05/15/2023    TRIG 132 10/27/2022     Lab Results   Component Value Date    AST 30 06/21/2024    AST 31 05/15/2023    AST 32 10/27/2022     Lab Results   Component Value Date    ALT 53 (H) 06/21/2024    ALT 77 (H) 05/15/2023    ALT 72 (H) 10/27/2022           ASSESSMENT AND PLAN:       Encounter Diagnoses   Name Primary?    Coronary artery disease involving native coronary artery of native heart without angina pectoris Yes    Cardiac calcification (HCC)     Resistant hypertension     Class 2 severe obesity due to excess calories with serious comorbidity and body mass index (BMI) of 38.0 to 38.9 in adult (HCC)     Encounter for new medication prescription     Great toe pain, left     Encounter for tobacco use cessation counseling        Patient is being prescribed Wegovy for cardiovascular risk factor reduction due to known cardiovascular disease, coronary artery disease, resistant hypertension and obesity with BMI of 38 0.31.      1. Coronary artery disease involving native coronary artery of native heart without angina pectoris  2. Cardiac calcification (HCC)  3. Resistant  hypertension  Patient is being prescribed Wegovy medication for cardiovascular risk factor reduction due to known cardiovascular disease, coronary artery disease, resistant hypertension and obesity with BMI of 38 0.31.  Patient denies family history of medullary thyroid cancer or MEN.  Recommend continue amlodipine 10 mg nightly, carvedilol 25 mg twice a day, hydrochlorothiazide 50 mg daily, olmesartan 40 mg nightly, atorvastatin 80 mg nightly, and aspirin 325 mg daily.  Follow-up in 2 months.    - semaglutide-weight management 0.25 MG/0.5ML Subcutaneous Solution Auto-injector; Inject 0.5 mL (0.25 mg total) into the skin once a week for 4 doses.  Dispense: 2 mL; Refill: 0    4. Class 2 severe obesity due to excess calories with serious comorbidity and body mass index (BMI) of 38.0 to 38.9 in adult (HCC)  Weight loss recommended.  Follow-up in 2 months.    - semaglutide-weight management 0.25 MG/0.5ML Subcutaneous Solution Auto-injector; Inject 0.5 mL (0.25 mg total) into the skin once a week for 4 doses.  Dispense: 2 mL; Refill: 0    5. Encounter for new medication prescription  Medication use, risks, benefits, side effects and precautions discussed, patient verbalizes understanding. Questions encouraged and answered to patient's satisfaction.    - semaglutide-weight management 0.25 MG/0.5ML Subcutaneous Solution Auto-injector; Inject 0.5 mL (0.25 mg total) into the skin once a week for 4 doses.  Dispense: 2 mL; Refill: 0    6. Great toe pain, left  Suspect gout versus other.  Uric acid level ordered and pending.  - Uric Acid; Future    7. Encounter for tobacco use cessation counseling  Patient advised to stop using oral nicotine pouches.    - Smoking Cessation 3-10 minutes (72951)          Orders Placed This Encounter   Procedures    Uric Acid    Smoking Cessation 3-10 minutes (15413)       Meds & Refills for this Visit:  Requested Prescriptions     Signed Prescriptions Disp Refills    semaglutide-weight management  0.25 MG/0.5ML Subcutaneous Solution Auto-injector 2 mL 0     Sig: Inject 0.5 mL (0.25 mg total) into the skin once a week for 4 doses.         Return in about 2 months (around 2/19/2025) for Annual wellness visit and progress on weight loss and cardiovascular issues.

## 2025-01-10 ENCOUNTER — TELEPHONE (OUTPATIENT)
Dept: FAMILY MEDICINE CLINIC | Facility: CLINIC | Age: 45
End: 2025-01-10

## 2025-01-10 NOTE — TELEPHONE ENCOUNTER
Fax received from [facility] requesting Prior Authorization for semaglutide-weight management 0.25 MG/0.5ML

## 2025-02-24 ENCOUNTER — OFFICE VISIT (OUTPATIENT)
Dept: FAMILY MEDICINE CLINIC | Facility: CLINIC | Age: 45
End: 2025-02-24
Payer: COMMERCIAL

## 2025-02-24 VITALS
RESPIRATION RATE: 16 BRPM | SYSTOLIC BLOOD PRESSURE: 132 MMHG | DIASTOLIC BLOOD PRESSURE: 76 MMHG | OXYGEN SATURATION: 95 % | BODY MASS INDEX: 39.84 KG/M2 | HEART RATE: 73 BPM | HEIGHT: 69 IN | TEMPERATURE: 98 F | WEIGHT: 269 LBS

## 2025-02-24 DIAGNOSIS — Z00.00 ROUTINE GENERAL MEDICAL EXAMINATION AT A HEALTH CARE FACILITY: Primary | ICD-10-CM

## 2025-02-24 DIAGNOSIS — E78.2 MIXED HYPERLIPIDEMIA: ICD-10-CM

## 2025-02-24 DIAGNOSIS — E78.6 LOW HDL (UNDER 40): ICD-10-CM

## 2025-02-24 DIAGNOSIS — Z71.6 ENCOUNTER FOR TOBACCO USE CESSATION COUNSELING: ICD-10-CM

## 2025-02-24 DIAGNOSIS — Z79.899 ENCOUNTER FOR LONG-TERM CURRENT USE OF MEDICATION: ICD-10-CM

## 2025-02-24 DIAGNOSIS — E66.01 CLASS 2 SEVERE OBESITY DUE TO EXCESS CALORIES WITH SERIOUS COMORBIDITY AND BODY MASS INDEX (BMI) OF 39.0 TO 39.9 IN ADULT (HCC): ICD-10-CM

## 2025-02-24 DIAGNOSIS — I1A.0 RESISTANT HYPERTENSION: ICD-10-CM

## 2025-02-24 DIAGNOSIS — I10 ESSENTIAL HYPERTENSION, BENIGN: ICD-10-CM

## 2025-02-24 DIAGNOSIS — Z76.89 ENCOUNTER FOR NEW MEDICATION PRESCRIPTION: ICD-10-CM

## 2025-02-24 DIAGNOSIS — Z28.21 INFLUENZA VACCINE REFUSED: ICD-10-CM

## 2025-02-24 DIAGNOSIS — G47.33 MODERATE OBSTRUCTIVE SLEEP APNEA: ICD-10-CM

## 2025-02-24 DIAGNOSIS — E66.812 CLASS 2 SEVERE OBESITY DUE TO EXCESS CALORIES WITH SERIOUS COMORBIDITY AND BODY MASS INDEX (BMI) OF 39.0 TO 39.9 IN ADULT (HCC): ICD-10-CM

## 2025-02-24 DIAGNOSIS — Z86.73 HISTORY OF CVA (CEREBROVASCULAR ACCIDENT): ICD-10-CM

## 2025-02-24 RX ORDER — OLMESARTAN MEDOXOMIL 40 MG/1
40 TABLET ORAL NIGHTLY
Qty: 90 TABLET | Refills: 1 | Status: SHIPPED | OUTPATIENT
Start: 2025-02-24

## 2025-02-24 RX ORDER — HYDROCHLOROTHIAZIDE 50 MG/1
50 TABLET ORAL DAILY
Qty: 90 TABLET | Refills: 1 | Status: SHIPPED | OUTPATIENT
Start: 2025-02-24

## 2025-02-24 RX ORDER — AMLODIPINE BESYLATE 10 MG/1
10 TABLET ORAL NIGHTLY
Qty: 90 TABLET | Refills: 1 | Status: SHIPPED | OUTPATIENT
Start: 2025-02-24

## 2025-02-24 RX ORDER — ATORVASTATIN CALCIUM 80 MG/1
80 TABLET, FILM COATED ORAL NIGHTLY
Qty: 90 TABLET | Refills: 1 | Status: SHIPPED | OUTPATIENT
Start: 2025-02-24

## 2025-02-24 RX ORDER — CARVEDILOL 25 MG/1
25 TABLET ORAL 2 TIMES DAILY WITH MEALS
Qty: 180 TABLET | Refills: 1 | Status: SHIPPED | OUTPATIENT
Start: 2025-02-24

## 2025-02-24 RX ORDER — TIRZEPATIDE 2.5 MG/.5ML
2.5 INJECTION, SOLUTION SUBCUTANEOUS
Qty: 2 ML | Refills: 0 | Status: SHIPPED | OUTPATIENT
Start: 2025-02-24

## 2025-02-24 NOTE — PROGRESS NOTES
Tonio Wyatt is a 45 year old male       Chief Complaint   Patient presents with    Wellness Visit     Reviewed Preventative/Wellness form with patient.      Hypertension    Hyperlipidemia    Hyperglycemia    Obesity    Obstructive Sleep Apnea (RAPHAEL)    Nicotine Dependence     Everyday Smokeless tobacco           HPI:           Obstructive Sleep Apnea:  Moderate.  Pt needs to follow up with Dr. Woodruff, sleep specialist.      Resistant hypertension:  Stable  Severity is severe, patient is on max doses of several antihypertensive medications.  Patient currently taking amlodipine 10 mg nightly, carvedilol 25 mg twice a day, hydrochlorothiazide 50 mg daily, olmesartan 40 mg nightly.        CAD/cardiac calcification/History of CVA:  Patient on max dose of atorvastatin, 80 mg nightly, and aspirin 325 mg daily.        Wt Readings from Last 6 Encounters:   02/24/25 269 lb (122 kg)   12/19/24 267 lb (121.1 kg)   12/18/24 250 lb (113.4 kg)   09/16/24 262 lb (118.8 kg)   08/20/24 261 lb (118.4 kg)   08/16/24 261 lb (118.4 kg)     Body mass index is 39.72 kg/m².           Patient Active Problem List   Diagnosis    Essential hypertension, benign    Low testosterone    Mixed hyperlipidemia    Low HDL (under 40)    PRES (posterior reversible encephalopathy syndrome)    Word finding difficulty    Elevated liver enzymes    Dyslipidemia    Allergic rhinitis    Fatty liver    History of CVA (cerebrovascular accident)    Encounter for weight loss counseling    Prediabetes    Bilateral lower extremity edema    Dry skin    Obstructive sleep apnea    Elevated coronary artery calcium score    Venous insufficiency of both lower extremities    Erectile dysfunction    Chronic fatigue    Class 2 severe obesity due to excess calories with serious comorbidity and body mass index (BMI) of 37.0 to 37.9 in adult (HCC)    Resistant hypertension    LOC (loss of consciousness) ([facility])    Encounter for long-term current use of medication     Smokeless tobacco use    Encounter for tobacco use cessation counseling    Coronary artery disease involving native coronary artery of native heart without angina pectoris    Cardiac calcification ([facility])    Class 2 severe obesity due to excess calories with serious comorbidity and body mass index (BMI) of 38.0 to 38.9 in adult ([facility])     Current Outpatient Medications   Medication Sig Dispense Refill    amLODIPine 10 MG Oral Tab Take 1 tablet (10 mg total) by mouth nightly. 90 tablet 1    atorvastatin 80 MG Oral Tab Take 1 tablet (80 mg total) by mouth nightly. 90 tablet 1    carvedilol 25 MG Oral Tab Take 1 tablet (25 mg total) by mouth 2 (two) times daily with meals. 180 tablet 1    hydroCHLOROthiazide 50 MG Oral Tab Take 1 tablet (50 mg total) by mouth daily. 90 tablet 1    Olmesartan Medoxomil 40 MG Oral Tab Take 1 tablet (40 mg total) by mouth at bedtime. 90 tablet 1    Tirzepatide-Weight Management (ZEPBOUND) 2.5 MG/0.5ML Subcutaneous Solution Auto-injector Inject 2.5 mg into the skin every 7 days. 2 mL 0    Blood Pressure Monitor Does not apply Device Use as directed.  Recommend Omron brand. 1 each 0    HYDROcodone-acetaminophen 5-325 MG Oral Tab Take 1 tablet by mouth every 6 (six) hours as needed for Pain. 10 tablet 0    ASPIRIN 325 MG Oral Tab TAKE ONE TABLET BY MOUTH ONE TIME DAILY  90 tablet 0    Cetirizine HCl (ZYRTEC OR) Take by mouth daily.        Past Medical History:    Acute, but ill-defined, cerebrovascular disease    MALU (acute kidney injury)    Chest pain    Class 2 severe obesity due to excess calories with serious comorbidity and body mass index (BMI) of 37.0 to 37.9 in adult ([facility])    Class 2 severe obesity due to excess calories with serious comorbidity and body mass index (BMI) of 37.0 to 37.9 in adult ([facility])    Associated with resistant hypertension, mixed hyperlipidemia, RAPHAEL, and history of CVA       Class 2 severe obesity due to excess calories with serious comorbidity and body mass index  (BMI) of 38.0 to 38.9 in adult (HCC)    Associate with hypertension, prediabetes, mixed hyperlipidemia, and RAPHAEL    Class 2 severe obesity due to excess calories with serious comorbidity and body mass index (BMI) of 39.0 to 39.9 in adult (HCC)    Decorative tattoo    Disorder of liver    fatty liver    Dizziness    Dyslipidemia    Goal LDL <70     Easy bruising    Essential hypertension, benign    Goal BP <=130/85     Fatigue    Flatulence/gas pain/belching    Focal infarction of brain (HCC)    Food intolerance    Frequent urination    Heartburn    History of ear infection    Hypertensive emergency    Indigestion    Leg swelling    Low HDL (under 40)    Mixed hyperlipidemia    Goal LDL <70     Resistant hypertension    Goal BP <=130/85     Shortness of breath    Sleep apnea    Sleep disturbance    Stress    Syncope    Visual impairment    glasses    Wears glasses    Weight gain    17 pound weight gain from 3/24/2021 to 10/1/2021      Past Surgical History:   Procedure Laterality Date    Vasectomy  12/4/14    Dr. Horta      Family History   Problem Relation Age of Onset    Hypertension Father     Diabetes Father     Hypertension Mother     Asthma Son     Heart Attack Maternal Grandmother     Heart Attack Maternal Grandfather     Heart Attack Paternal Grandmother     Heart Attack Paternal Grandfather       Social History:  Social History     Socioeconomic History    Marital status:    Tobacco Use    Smoking status: Former     Current packs/day: 0.00     Average packs/day: 1.5 packs/day for 14.0 years (21.0 ttl pk-yrs)     Types: Cigarettes     Quit date: 7/1/2014     Years since quitting: 10.6    Smokeless tobacco: Current    Tobacco comments:     pt smoked for 15 years    Vaping Use    Vaping status: Former   Substance and Sexual Activity    Alcohol use: Yes     Alcohol/week: 3.0 standard drinks of alcohol     Types: 3 Standard drinks or equivalent per week     Comment: social on weekends    Drug use: Yes      Types: Cannabis     Comment: Gummies    Sexual activity: Not Currently   Other Topics Concern    Caffeine Concern Yes     Comment: 1 cup coffee    Exercise Yes     Comment: very active at work.     Seat Belt Yes            Occ: sales.  Marital Status: . Children: 3.   Exercise: working on golf swing, [employer] bells, light weights on bowflex, cardio.    Diet: watches fats somewhat watches sugar closely     REVIEW OF SYSTEMS:   GENERAL: Feels well overall. Denies fever or chills.   INTEGUMENT: Denies any new or changing skin lesions.  EYES: Denies changes in vision.  HENT: Denies upper respiratory symptoms.  LUNGS: Denies LINDSAY, wheezing, cough.  Dyspnea on exertion.  CARDIOVASCULAR: Denies CP or palpitations. Denies chest pain on exertion.  GI: Denies abdominal pain, denies heartburn, denies n/v/c/d/change in stools/blood in stool/black stool/change in appetite  : Denies nocturia or changes in urinary stream. Denies scrotal mass/abnormal discharge from urethra.  MUSCULOSKELETAL: No complaints of joint or muscle aches or pains.  NEURO: Denies headaches/dizziness  PSYCH: Denies depression or anxiety  HEMATOLOGIC: No complaints of easy bruising/bleeding/anemia/blood clot disorders  ENDOCRINE: No complaints of enlarged neck glands/polyuria/polydypsia.      EXAM:   /76   Pulse 73   Temp 98.1 °F (36.7 °C) (Temporal)   Resp 16   Ht 5' 9\" (1.753 m)   Wt 269 lb (122 kg)   SpO2 95%   BMI 39.72 kg/m²   Body mass index is 39.72 kg/m².   GENERAL: NAD. Pleasant, well developed, nonill appearing obese  male  INTEGUMENT: No visible rashes.  No visible suspicious lesions.  HENT: NCAT, EACs clear b/l, TMs normal b/l.  Nose: No nasal discharge.  OP: MMM.  Posteriorly no exudate or erythema.  EYES: PERRL.  EOMI.  Conjunctiva non-injected.  Non-icteric.  NECK: Supple. No cervical or supraclavicular adenopathy, no thyromegaly/thyroid nodules appreciated, no masses  LUNGS: CTA A/P, no  wheezes/ronchi/rales/crackles, normal air excursion  CARDIOVASCULAR: RRR, no murmur.  No lower extremity edema.  Abdomen: Obese.  Non-tender to palpation, no HSM/masses/pulsations  MUSCULOSKELETAL: Back with normal AROM, no joint swelling  EXTREMITIES: No cyanosis, clubbing, or edema  NEURO: A&O x3.  No gross motor or gross sensory abnormality.  PSYCH: Normal affect. No apparent thought disorder. Average judgement and insight.    Immunization History   Administered Date(s) Administered    >=3 YRS TRI  MULTIDOSE VIAL (97278) FLU CLINIC 10/25/2014    TDAP 10/25/2014   Pended Date(s) Pended    Influenza Vaccine Refused 02/21/2024, 03/01/2025         DATA:                 [facility]       Accredited by the American Academy of Sleep Medicine (AASM)     PATIENT'S NAME:        MELISSA KAMI SHREYA  ATTENDING PHYSICIAN:   Francis Woodruff DO  REFERRING PHYSICIAN:     PATIENT ACCOUNT #:     [id number]        LOCATION:       [facility]  MEDICAL RECORD #:      [id number]        YOB: 1980  DATE OF STUDY:         06/03/2021     SLEEP STUDY REPORT     STUDY TYPE:  Unattended sleep study.     CLINICAL HISTORY:  This is a 41-year-old male whose height is 5 feet 10 inches, weight is 250 pounds, body mass index is 36.  He was hospitalized in March with hypertensive urgency.  He was found to have a mildly thickened left ventricle on the echocardiogram as well as mild elevation of his pulmonary artery pressures.  Diagnostic polysomnogram was requested.  Current medications include amlodipine, aspirin, atorvastatin, carvedilol, Zyrtec, hydrochlorothiazide, losartan, meclizine, and probiotics.  Patient reports occasional snoring.  [place] Sleepiness Scale score is 5.  He retires to bed between 9 to 10 p.m.  It takes minutes to fall asleep.  He does not experience frequent awakenings, and his final out of bed time is 4 to 5 a.m.  This is the report of his home sleep test.     UNATTENDED SLEEP STUDY RECORDING  PARAMETERS:  The patient underwent a formal technically adequate unattended diagnostic sleep study coordinated with the [facility].  The study was performed in accordance with the AASM standard for Out of Center Sleep Testing.  The four-channel Type III HST measures the following parameters:  flow, respiratory effort, pulse, and oxygen saturation.     SCORING:  This study was scored in accordance with AASM scoring rules and Medicare rule 1B.     FINDINGS:  The flow evaluation recording time began at 10 p.m. and ended at 3:29 a.m. for a duration of 3 hours 12 minutes.  Patient slept in supine and lateral positions.  Moderate snoring was recorded.  There was a total of 4 hypopneas and 48 apneas for an overall apnea-hypopnea index of 27.4.  The lowest oxyhemoglobin desaturation was seen at a kaila of 82%.  Time spent with oxyhemoglobin desaturations less than 88% was 1 minute.  Average pulse was 68.       IMPRESSION:  Overall apnea-hypopnea index of 27.4 consistent with moderate obstructive sleep apnea with the lowest oxyhemoglobin desaturation seen at a kaila of 82%.     DIAGNOSIS:  Obstructive sleep apnea.     DIAGNOSTIC PLAN:    1.       At the request of the referring physician, we will confer with the patient regarding the results of the study and make treatment recommendations.   2.       Patient is a candidate for nasal CPAP, oral appliance therapy, and evaluation of the upper airway by ear, nose, throat specialist.   3.       Weight loss would likely have an ameliorating effect on the degree of sleep-disordered breathing identified, and weight loss is advised as appropriate.   4.       If daytime sleepiness is a complaint, the patient needs to understand potential dangers associated with reduced daytime vigilance.   5.       Care should be used with the administration of anesthetic and sedative agents, and alcohol should be avoided.      Thank you for your confidence in the [facility].  Please  do not hesitate to contact us for any questions at [phone].     Dictated By Francis Woodruff DO  d:06/08/2021 14:54:49  t:06/08/2021 16:16:35  [id number]  RB/      Component      Latest Ref Rng 10/27/2022 5/15/2023 6/21/2024   Glucose      70 - 99 mg/dL 113 (H)  122 (H)  114 (H)    Sodium      136 - 145 mmol/L 142  139  143    Potassium      3.5 - 5.1 mmol/L 3.4 (L)  3.1 (L)  3.6    Chloride      98 - 112 mmol/L 105  105  108    Carbon Dioxide, Total      21.0 - 32.0 mmol/L 30.0  29.0  27.0    ANION GAP      0 - 18 mmol/L 7  5  8    BUN      9 - 23 mg/dL 11  12  13    CREATININE      0.70 - 1.30 mg/dL 0.92  0.96  0.96    BUN/CREATININE RATIO      10.0 - 20.0    13.5    CALCIUM      8.7 - 10.4 mg/dL 9.3  9.3  9.3    CALCULATED OSMOLALITY      275 - 295 mOsm/kg 294  289  297 (H)    EGFR      >=60 mL/min/1.73m2 108  102  101    ALT (SGPT)      10 - 49 U/L 72 (H)  77 (H)  53 (H)    AST (SGOT)      <=34 U/L 32  31  30    ALKALINE PHOSPHATASE      45 - 117 U/L 72  81  70    Total Bilirubin      0.3 - 1.2 mg/dL 1.0  0.8  0.9    PROTEIN, TOTAL      5.7 - 8.2 g/dL 7.4  7.6  7.3    Albumin      3.2 - 4.8 g/dL 4.6  4.5  4.8    Globulin      2.0 - 3.5 g/dL 2.8  3.1  2.5    A/G Ratio      1.0 - 2.0  1.6  1.5  1.9    Patient Fasting for CMP? Yes  Yes  Yes           Lab Results   Component Value Date    A1C 5.6 06/21/2024    A1C 5.9 (A) 07/20/2023    A1C 6.0 (A) 05/25/2022     06/21/2024     03/12/2021     12/19/2014     Lab Results   Component Value Date    CHOLEST 137 06/21/2024    CHOLEST 131 05/15/2023    CHOLEST 143 10/27/2022     Lab Results   Component Value Date    HDL 34 (L) 06/21/2024    HDL 38 (L) 05/15/2023    HDL 36 (L) 10/27/2022     Lab Results   Component Value Date    LDL 86 06/21/2024    LDL 72 05/15/2023    LDL 83 10/27/2022     Lab Results   Component Value Date    TRIG 86 06/21/2024    TRIG 114 05/15/2023    TRIG 132 10/27/2022     Lab Results   Component Value Date    AST  30 06/21/2024    AST 31 05/15/2023    AST 32 10/27/2022     Lab Results   Component Value Date    ALT 53 (H) 06/21/2024    ALT 77 (H) 05/15/2023    ALT 72 (H) 10/27/2022             ASSESSMENT AND PLAN:   Tonio Wyatt is a 45 year old male who presents for a complete physical exam.       Encounter Diagnoses   Name Primary?    Routine general medical examination at a health care facility Yes    Moderate obstructive sleep apnea     Class 2 severe obesity due to excess calories with serious comorbidity and body mass index (BMI) of 39.0 to 39.9 in adult ([facility])     Encounter for new medication prescription     Essential hypertension, benign Goal BP <=130/85     Resistant hypertension     History of CVA (cerebrovascular accident)     Encounter for long-term current use of medication     Mixed hyperlipidemia Goal LDL <70     Low HDL (under 40)     Encounter for tobacco use cessation counseling     Influenza vaccine refused              Total time 60 minutes precharting, history and physical, plan of care of which 40 minutes was spent addressing the patient's medical conditions in addition to the wellness visit.    Order labs at follow-up appointment.      1. Routine general medical examination at a health care facility  Patient provided handout on men's health and prevention.      2. Moderate obstructive sleep apnea  3. Class 2 severe obesity due to excess calories with serious comorbidity and body mass index (BMI) of 39.0 to 39.9 in adult ([facility])  Patient with moderate obstructive sleep apnea associated with obesity.  Obesity associated with hyperlipidemia, cardiac calcification, resistant hypertension, history of CVA   Recommend start Zepbound as below.  Recommend healthy diet including green leafy vegetables, fresh fruits and protein.  Aerobic exercise for total of 150 minutes/week is recommended for cardiovascular fitness, and 45-60 minutes 6-7 days a week to help obtain weight loss.   Follow-up in 1  month.    Patient provided reprint of Dr. Woodruff sleep study order.  -Patient advised to schedule sleep study as ordered by Dr. Woodruff.    - Tirzepatide-Weight Management (ZEPBOUND) 2.5 MG/0.5ML Subcutaneous Solution Auto-injector; Inject 2.5 mg into the skin every 7 days.  Dispense: 2 mL; Refill: 0    4. Encounter for new medication prescription  Medication use, risks, benefits, side effects and precautions discussed, patient verbalizes understanding. Questions encouraged and answered to patient's satisfaction.    - Tirzepatide-Weight Management (ZEPBOUND) 2.5 MG/0.5ML Subcutaneous Solution Auto-injector; Inject 2.5 mg into the skin every 7 days.  Dispense: 2 mL; Refill: 0    5. Essential hypertension, benign Goal BP <=130/85  6. Resistant hypertension  Fairly well controlled.  Please note that patient is on max doses of 4 antihypertensive medications as below.  Recommend continue amlodipine, carvedilol, hydrochlorothiazide, and olmesartan as below.  Recommend monitor blood pressure at home several times a week.  Follow-up on hypertension in 6 months, sooner if needed.    - amLODIPine 10 MG Oral Tab; Take 1 tablet (10 mg total) by mouth nightly.  Dispense: 90 tablet; Refill: 1  - carvedilol 25 MG Oral Tab; Take 1 tablet (25 mg total) by mouth 2 (two) times daily with meals.  Dispense: 180 tablet; Refill: 1  - hydroCHLOROthiazide 50 MG Oral Tab; Take 1 tablet (50 mg total) by mouth daily.  Dispense: 90 tablet; Refill: 1  - Olmesartan Medoxomil 40 MG Oral Tab; Take 1 tablet (40 mg total) by mouth at bedtime.  Dispense: 90 tablet; Refill: 1  - Blood Pressure Monitor Does not apply Device; Use as directed.  Recommend Omron brand.  Dispense: 1 each; Refill: 0    7. History of CVA (cerebrovascular accident)  Patient aware of importance of controlling his hypertension.  Patient to continue antihypertensive medications as above and continue atorvastatin 80 mg nightly.    - amLODIPine 10 MG Oral Tab; Take 1 tablet (10  mg total) by mouth nightly.  Dispense: 90 tablet; Refill: 1  - atorvastatin 80 MG Oral Tab; Take 1 tablet (80 mg total) by mouth nightly.  Dispense: 90 tablet; Refill: 1    8. Encounter for long-term current use of medication  Medication use, risks, benefits, side effects and precautions discussed, patient verbalizes understanding. Questions encouraged and answered to patient's satisfaction.    - amLODIPine 10 MG Oral Tab; Take 1 tablet (10 mg total) by mouth nightly.  Dispense: 90 tablet; Refill: 1  - atorvastatin 80 MG Oral Tab; Take 1 tablet (80 mg total) by mouth nightly.  Dispense: 90 tablet; Refill: 1  - carvedilol 25 MG Oral Tab; Take 1 tablet (25 mg total) by mouth 2 (two) times daily with meals.  Dispense: 180 tablet; Refill: 1  - hydroCHLOROthiazide 50 MG Oral Tab; Take 1 tablet (50 mg total) by mouth daily.  Dispense: 90 tablet; Refill: 1  - Olmesartan Medoxomil 40 MG Oral Tab; Take 1 tablet (40 mg total) by mouth at bedtime.  Dispense: 90 tablet; Refill: 1    9. Mixed hyperlipidemia Goal LDL <70  10. Low HDL (under 40)  LDL goal is around 70, recommend patient intensify therapeutic lifestyle changes, he is already on max dose of atorvastatin as below which she will continue.  Consider add Zetia in the near future.  Recommend order labs at follow-up appointment.    - atorvastatin 80 MG Oral Tab; Take 1 tablet (80 mg total) by mouth nightly.  Dispense: 90 tablet; Refill: 1    11. Encounter for tobacco use cessation counseling  Advised to stop using nicotine pouches that he uses orally.  Patient contemplating cessation of use of nicotine pouches.  -Recommend decrease number of nicotine containing pouches by 2 every 7 days to wean off.    -Information regarding use of nicotine pouches added to  patient:    https://www.ahajournals.org/doi/10.1161/CIR.5532430398642907#:~:text=Smokeless%20oral%20nicotine%20products%20are,ischemic%20heart%20or%20cerebrovascular%20disease.  https://www.[employer]/smoking-cessation/nicotine-pouches  https://www.heart.org/en/healthy-living/healthy-lifestyle/quit-smoking-tobacco/triple-threat-e-cigarettes-oral-nicotine-pouches-and-heat-not-burn-products  https://www.[facility].org/cancerwise/what-to-know-about-nicotine-pouches.r75-660778497.html    - Smoking Cessation 3-10 minutes (40565)    12. Influenza vaccine refused  - Influenza Vaccine Refused (Order that documents the process)          Orders Placed This Encounter   Procedures    Influenza Vaccine Refused (Order that documents the process)    Smoking Cessation 3-10 minutes (38805)       Meds & Refills for this Visit:  Requested Prescriptions     Signed Prescriptions Disp Refills    amLODIPine 10 MG Oral Tab 90 tablet 1     Sig: Take 1 tablet (10 mg total) by mouth nightly.    atorvastatin 80 MG Oral Tab 90 tablet 1     Sig: Take 1 tablet (80 mg total) by mouth nightly.    carvedilol 25 MG Oral Tab 180 tablet 1     Sig: Take 1 tablet (25 mg total) by mouth 2 (two) times daily with meals.    hydroCHLOROthiazide 50 MG Oral Tab 90 tablet 1     Sig: Take 1 tablet (50 mg total) by mouth daily.    Olmesartan Medoxomil 40 MG Oral Tab 90 tablet 1     Sig: Take 1 tablet (40 mg total) by mouth at bedtime.    Tirzepatide-Weight Management (ZEPBOUND) 2.5 MG/0.5ML Subcutaneous Solution Auto-injector 2 mL 0     Sig: Inject 2.5 mg into the skin every 7 days.    Blood Pressure Monitor Does not apply Device 1 each 0     Sig: Use as directed.  Recommend Omron brand.       Imaging & Consults:  INFLUENZA REFUSED EE      Return in about 1 month (around 3/24/2025) for Progress on weight loss and tolerance of the Zepbound.

## 2025-02-24 NOTE — PATIENT INSTRUCTIONS
-Information regarding use of nicotine pouches:    https://www.ahajournals.org/doi/10.1161/CIR.8019548018640494#:~:text=Smokeless%20oral%20nicotine%20products%20are,ischemic%20heart%20or%20cerebrovascular%20disease.    https://www.[employer]/smoking-cessation/nicotine-pouches      https://www.heart.org/en/healthy-living/healthy-lifestyle/quit-smoking-tobacco/triple-threat-e-cigarettes-oral-nicotine-pouches-and-heat-not-burn-products      https://www.[facility].org/cancerwise/what-to-know-about-nicotine-pouches.g44-178848525.html      -Recommend decrease number of nicotine containing pouches by 2 every 7 days to wean off.    -Recommend schedule sleep study as ordered by Dr. Woodruff.

## 2025-03-07 ENCOUNTER — TELEPHONE (OUTPATIENT)
Dept: FAMILY MEDICINE CLINIC | Facility: CLINIC | Age: 45
End: 2025-03-07

## 2025-03-24 ENCOUNTER — TELEPHONE (OUTPATIENT)
Dept: FAMILY MEDICINE CLINIC | Facility: CLINIC | Age: 45
End: 2025-03-24

## 2025-03-24 ENCOUNTER — OFFICE VISIT (OUTPATIENT)
Dept: FAMILY MEDICINE CLINIC | Facility: CLINIC | Age: 45
End: 2025-03-24
Payer: COMMERCIAL

## 2025-03-24 VITALS
WEIGHT: 277.38 LBS | RESPIRATION RATE: 18 BRPM | SYSTOLIC BLOOD PRESSURE: 132 MMHG | BODY MASS INDEX: 41.08 KG/M2 | HEIGHT: 69 IN | HEART RATE: 74 BPM | OXYGEN SATURATION: 96 % | DIASTOLIC BLOOD PRESSURE: 78 MMHG

## 2025-03-24 DIAGNOSIS — L30.9 ECZEMA, UNSPECIFIED TYPE: ICD-10-CM

## 2025-03-24 DIAGNOSIS — L85.3 DRY SKIN DERMATITIS: ICD-10-CM

## 2025-03-24 DIAGNOSIS — E66.01 MORBID OBESITY WITH BMI OF 40.0-44.9, ADULT (HCC): Primary | ICD-10-CM

## 2025-03-24 DIAGNOSIS — Z71.3 ENCOUNTER FOR WEIGHT LOSS COUNSELING: ICD-10-CM

## 2025-03-24 DIAGNOSIS — G47.33 OSA (OBSTRUCTIVE SLEEP APNEA): ICD-10-CM

## 2025-03-24 PROCEDURE — 99214 OFFICE O/P EST MOD 30 MIN: CPT | Performed by: FAMILY MEDICINE

## 2025-03-24 RX ORDER — TIRZEPATIDE 2.5 MG/.5ML
2.5 INJECTION, SOLUTION SUBCUTANEOUS
Qty: 2 ML | Refills: 0 | Status: SHIPPED | OUTPATIENT
Start: 2025-03-24

## 2025-03-24 RX ORDER — TRIAMCINOLONE ACETONIDE 5 MG/G
1 OINTMENT TOPICAL NIGHTLY
Qty: 15 G | Refills: 0 | Status: SHIPPED | OUTPATIENT
Start: 2025-03-24

## 2025-03-24 NOTE — TELEPHONE ENCOUNTER
Zepbound 2.5mg prior auth needs to be initiated.   Pt was on wegovy last year and was not approved so provider then sent in a script for zepbound 2.5mg today.      Prior auth initiated

## 2025-03-24 NOTE — PROGRESS NOTES
Tonio Wyatt is a 45 year old male.     Chief Complaint   Patient presents with    Weight Management    Hypertension    Obstructive Sleep Apnea (RAPHAEL)           Derm Problem         HPI:       Obesity:  We have been trying to get Zepbound covered for patient.  Patient does have diagnosis of moderate RAPHAEL.      RAPHAEL:  Moderate.  Patient has outstanding sleep titration study ordered by Dr. Woodruff to complete.  Patient reports he has been having difficulty connecting with Dr. Woodruff's office and with the sleep lab.      Dry skin:  Multiple places including hands, fingers, arms, and legs.  Patient notes that his fingers become hard and cracked and painful.  Patient reports he has tried multiple different moisturizers without relief.  Patient requesting referral to dermatologist.          Wt Readings from Last 6 Encounters:   03/24/25 277 lb 6.4 oz (125.8 kg)   02/24/25 269 lb (122 kg)   12/19/24 267 lb (121.1 kg)   12/18/24 250 lb (113.4 kg)   09/16/24 262 lb (118.8 kg)   08/20/24 261 lb (118.4 kg)      Body mass index is 40.96 kg/m².        Current Outpatient Medications   Medication Sig Dispense Refill    Tirzepatide-Weight Management (ZEPBOUND) 2.5 MG/0.5ML Subcutaneous Solution Auto-injector Inject 2.5 mg into the skin every 7 days. 2 mL 0    triamcinolone 0.5 % External Ointment Apply 1 tablet topically at bedtime. 15 g 0    amLODIPine 10 MG Oral Tab Take 1 tablet (10 mg total) by mouth nightly. 90 tablet 1    atorvastatin 80 MG Oral Tab Take 1 tablet (80 mg total) by mouth nightly. 90 tablet 1    carvedilol 25 MG Oral Tab Take 1 tablet (25 mg total) by mouth 2 (two) times daily with meals. 180 tablet 1    hydroCHLOROthiazide 50 MG Oral Tab Take 1 tablet (50 mg total) by mouth daily. 90 tablet 1    Olmesartan Medoxomil 40 MG Oral Tab Take 1 tablet (40 mg total) by mouth at bedtime. 90 tablet 1    Blood Pressure Monitor Does not apply Device Use as directed.  Recommend [employer] brand. 1 each 0    ASPIRIN 325  MG Oral Tab TAKE ONE TABLET BY MOUTH ONE TIME DAILY  90 tablet 0    Cetirizine HCl (ZYRTEC OR) Take by mouth daily.        Past Medical History:    Acute, but ill-defined, cerebrovascular disease    MALU (acute kidney injury)    Chest pain    Class 2 severe obesity due to excess calories with serious comorbidity and body mass index (BMI) of 37.0 to 37.9 in adult (HCC)    Class 2 severe obesity due to excess calories with serious comorbidity and body mass index (BMI) of 37.0 to 37.9 in adult (HCC)    Associated with resistant hypertension, mixed hyperlipidemia, RAPHAEL, and history of CVA       Class 2 severe obesity due to excess calories with serious comorbidity and body mass index (BMI) of 38.0 to 38.9 in adult (HCC)    Associate with hypertension, prediabetes, mixed hyperlipidemia, and RAPHAEL    Class 2 severe obesity due to excess calories with serious comorbidity and body mass index (BMI) of 39.0 to 39.9 in adult ([facility])    Decorative tattoo    Disorder of liver    fatty liver    Dizziness    Dyslipidemia    Goal LDL <70     Easy bruising    Essential hypertension, benign    Goal BP <=130/85     Fatigue    Flatulence/gas pain/belching    Focal infarction of brain (HCC)    Food intolerance    Frequent urination    Heartburn    History of ear infection    Hypertensive emergency    Indigestion    Leg swelling    Low HDL (under 40)    Mixed hyperlipidemia    Goal LDL <70     Resistant hypertension    Goal BP <=130/85     Shortness of breath    Sleep apnea    Sleep disturbance    Stress    Syncope    Visual impairment    glasses    Wears glasses    Weight gain    17 pound weight gain from 3/24/2021 to 10/1/2021      Past Surgical History:   Procedure Laterality Date    Vasectomy  12/4/14    Dr. Horta      Social History:    Social History     Socioeconomic History    Marital status:    Tobacco Use    Smoking status: Former     Current packs/day: 0.00     Average packs/day: 1.5 packs/day for 14.0 years (21.0 ttl  pk-yrs)     Types: Cigarettes     Quit date: 7/1/2014     Years since quitting: 10.7    Smokeless tobacco: Current    Tobacco comments:     pt smoked for 15 years    Vaping Use    Vaping status: Former   Substance and Sexual Activity    Alcohol use: Yes     Alcohol/week: 3.0 standard drinks of alcohol     Types: 3 Standard drinks or equivalent per week     Comment: social on weekends    Drug use: Yes     Types: Cannabis     Comment: Gummies    Sexual activity: Not Currently   Other Topics Concern    Caffeine Concern Yes     Comment: 1 cup coffee    Exercise Yes     Comment: very active at work.     Seat Belt Yes         Family History   Problem Relation Age of Onset    Hypertension Father     Diabetes Father     Hypertension Mother     Asthma Son     Heart Attack Maternal Grandmother     Heart Attack Maternal Grandfather     Heart Attack Paternal Grandmother     Heart Attack Paternal Grandfather      REVIEW OF SYSTEMS:   GENERAL HEALTH: Overall feels well.  No fever.   INTEGUMENT: As in HPI  RESPIRATORY: Denies: LINSDAY/JOSEPH/Cough  CARDIOVASCULAR: Denies CP/palpitations  VASCULAR: Denies LE edema  GI: Denies abdominal pain/nausea/vomiting/blood in stool/black stool/bloating/constipation/diarrhea  : denies urinary symptoms  NEURO: denies headaches/dizziness/fainting/weakness/change in vision  PSYCH: denies depression and anxiety    Immunization History   Administered Date(s) Administered    >=3 YRS TRI  MULTIDOSE VIAL (27357) FLU CLINIC 10/25/2014    TDAP 10/25/2014   Pended Date(s) Pended    Influenza Vaccine Refused 02/21/2024, 03/01/2025       EXAM:   /78   Pulse 74   Resp 18   Ht 5' 9\" (1.753 m)   Wt 277 lb 6.4 oz (125.8 kg)   SpO2 96%   BMI 40.96 kg/m²   GENERAL: NAD, pleasant obese  male  INTEGUMENT: Dry skin that is flaky of arms and legs.  There are multiple areas of bilateral hands and fingers that are very dry and in some cases cracked or starting to crack.  There are areas of  hyperpigmentation of bilateral lower extremities below the knees.  HEAD: NCAT  LUNGS: CTA A/P no wheezes/ronchi/rales/crackles  CARDIO: RRR, +S1/S2, no mm  VASCULAR: Trace bilateral lower extremity edema  EXTREMITIES: no cyanosis or clubbing  NEURO: Alert and Oriented x3.  No gross motor or gross sensory abnormalities.  PSYCH: Affect normal.  Normal thought content.        DATA:      Lab Results   Component Value Date    A1C 5.6 06/21/2024    A1C 5.9 (A) 07/20/2023    A1C 6.0 (A) 05/25/2022     06/21/2024     03/12/2021     12/19/2014     Lab Results   Component Value Date    CHOLEST 137 06/21/2024    CHOLEST 131 05/15/2023    CHOLEST 143 10/27/2022     Lab Results   Component Value Date    HDL 34 (L) 06/21/2024    HDL 38 (L) 05/15/2023    HDL 36 (L) 10/27/2022     Lab Results   Component Value Date    LDL 86 06/21/2024    LDL 72 05/15/2023    LDL 83 10/27/2022     Lab Results   Component Value Date    TRIG 86 06/21/2024    TRIG 114 05/15/2023    TRIG 132 10/27/2022     Lab Results   Component Value Date    AST 30 06/21/2024    AST 31 05/15/2023    AST 32 10/27/2022     Lab Results   Component Value Date    ALT 53 (H) 06/21/2024    ALT 77 (H) 05/15/2023    ALT 72 (H) 10/27/2022           ASSESSMENT AND PLAN:       Encounter Diagnoses   Name Primary?    Morbid obesity with BMI of 40.0-44.9, adult (HCC) Yes    RAPHAEL (obstructive sleep apnea)     Encounter for weight loss counseling     Eczema, unspecified type     Dry skin dermatitis        -If Dr. Uriostegui, the dermatologist, is not in network, please call to let Dr. Granados know so we can refer you to someone else.  -For your lower legs, recommend use [place] apricot scrub in the shower, then after toweling off apply the CeraVe.    -If you do not hear from the sleep center by the end of the week, please call to let Dr. Granados know so we can have our nurse help facilitate an appointment.      Sleep Specialist:  Dr. Kacy Esteban  [address]  [place] [address]  [phone]    Patient did call Dr. Woodruff's office while he was in the exam room with me, they answered the phone and verified that he has an outstanding sleep titration study to do.  Also while in the exam room with me patient did call the sleep lab center to schedule the sleep titration study, however he had to leave a message.      1. Morbid obesity with BMI of 40.0-44.9, adult (HCC)  2. RAPHAEL (obstructive sleep apnea)  3. Encounter for weight loss counseling  Recommend Zepbound which is FDA approved to treat patients with moderate to severe sleep apnea who are also obese.   has initiated prior authorization.  Therapeutic lifestyle changes encouraged.  Follow-up in approximately 1 month.    -If you do not hear from the sleep center by the end of the week, please call to let Dr. Granados know so we can have our nurse help facilitate an appointment.    - Tirzepatide-Weight Management (ZEPBOUND) 2.5 MG/0.5ML Subcutaneous Solution Auto-injector; Inject 2.5 mg into the skin every 7 days.  Dispense: 2 mL; Refill: 0    4. Eczema, unspecified type  5. Dry skin dermatitis  Recommend trial of triamcinolone as below.  Patient referred to Dr. Uriostegui, dermatologist, for further evaluation and care.    -If Dr. Uriostegui, the dermatologist, is not in network, please call to let Dr. Granados know so we can refer you to someone else.  -For your lower legs, recommend use [place] apricot scrub in the shower, then after toweling off apply the CeraVe.    - Derm Referral - External  - triamcinolone 0.5 % External Ointment; Apply 1 tablet topically at bedtime.  Dispense: 15 g; Refill: 0          Meds & Refills for this Visit:  Requested Prescriptions     Signed Prescriptions Disp Refills    Tirzepatide-Weight Management (ZEPBOUND) 2.5 MG/0.5ML Subcutaneous Solution Auto-injector 2 mL 0     Sig: Inject 2.5 mg into the skin every 7 days.    triamcinolone 0.5 % External Ointment  15 g 0     Sig: Apply 1 tablet topically at bedtime.       Imaging & Consults:  DERM - EXTERNAL      Return in about 1 month (around 4/24/2025) for Progress on weight loss and use of Zepbound.

## 2025-03-24 NOTE — PATIENT INSTRUCTIONS
-If Dr. Uriostegui, the dermatologist, is not in network, please call to let Dr. Granados know so we can refer you to someone else.    -For your lower legs, recommend use [place] apricot scrub in the shower, then after toweling off apply the CeraVe.    -If you do not hear from the sleep center by the end of the week, please call to let Dr. Granados know so we can have our nurse help facilitate an appointment.      Sleep Specialist:  Dr. Woodruff   [address] [place] [address]  [phone]

## 2025-03-24 NOTE — TELEPHONE ENCOUNTER
Tonio called because medication has been rejected because insurance will not cover it. It is the Tirzepatide-Weight Management (ZEPBOUND) 2.5 MG/0.5ML Subcutaneous Solution Auto-injector . It was prescribed by Dr. Eli Granados.    Pharmacy is   [id number] - [place], [place] - [address] [phone], [phone] [address] [facility] [address]   Phone: [phone] Fax: [phone]   Hours: Not open 24 hours

## 2025-03-25 NOTE — TELEPHONE ENCOUNTER
Note from payer: Request Reference Number: [id number].  ZEPBOUND     INJ 2.5/0.5 is denied due to Plan Exclusion.  For further questions, call [phone].  Payer: [employer] [facility] [employer] Case ID: [id number]  Electronic appeal: Not supported  Appeal instructions: Appeals are not supported through ePA. Please refer to the fax case notice for appeals information and instructions.  View History    Doc,   Pt's plan does cover zepbound it is a plan exclusion.

## 2025-03-26 NOTE — TELEPHONE ENCOUNTER
Would they cover Wegovy?  If they will not cover Zepbound or Wegovy, please inform patient and I therefore recommend that he go to the [facility] weight loss clinic.

## 2025-03-26 NOTE — TELEPHONE ENCOUNTER
Spoke to patient with information and asked if he would contact his insurance to see if weight loss meds are covered.  If so, let us know.  If not, next step would be weight loss clinic.

## 2025-05-23 ENCOUNTER — OFFICE VISIT (OUTPATIENT)
Dept: SLEEP CENTER | Age: 45
End: 2025-05-23
Attending: Other
Payer: COMMERCIAL

## 2025-05-23 DIAGNOSIS — Z86.73 HISTORY OF CVA (CEREBROVASCULAR ACCIDENT): ICD-10-CM

## 2025-05-23 DIAGNOSIS — G47.33 OBSTRUCTIVE SLEEP APNEA: ICD-10-CM

## 2025-05-23 DIAGNOSIS — I67.83 PRES (POSTERIOR REVERSIBLE ENCEPHALOPATHY SYNDROME): ICD-10-CM

## 2025-05-23 PROCEDURE — 95811 POLYSOM 6/>YRS CPAP 4/> PARM: CPT

## 2025-05-28 ENCOUNTER — PATIENT MESSAGE (OUTPATIENT)
Facility: CLINIC | Age: 45
End: 2025-05-28

## 2025-06-09 ENCOUNTER — SLEEP STUDY (OUTPATIENT)
Facility: CLINIC | Age: 45
End: 2025-06-09
Payer: COMMERCIAL

## 2025-06-09 DIAGNOSIS — G47.33 OBSTRUCTIVE SLEEP APNEA SYNDROME: Primary | ICD-10-CM

## 2025-06-09 PROCEDURE — 95810 POLYSOM 6/> YRS 4/> PARAM: CPT | Performed by: OTHER

## 2025-06-24 ENCOUNTER — PATIENT MESSAGE (OUTPATIENT)
Facility: CLINIC | Age: 45
End: 2025-06-24

## (undated) DIAGNOSIS — I10 ESSENTIAL HYPERTENSION, BENIGN: ICD-10-CM

## (undated) NOTE — LETTER
03/14/21    Dano Padilla      To Whom It May Concern: The above patient was seen at [facility] for treatment of a medical condition from 3/12/2021-3/14/2021. The patient may return to work on 3/17/2021 without any restrictions.       Sincerel

## (undated) NOTE — MR AVS SNAPSHOT
[facility] [address]  [address], [facility] [address] [address] [phone]               Thank you for choosing us for your health care visit with ANNMARIE Vogel. We are glad to serve you and happy to provide you with this summary of your visit.   Please he needed. Avoid medications with decongestant  · Follow up in 2 weeks if not better or sooner if worsening symptoms. Follow up with primary care and cardiology as discussed. Seek immediate care if inability to swallow or breathe.   ·        Follow Up with Our information, go to [email or web address] and click on the Sign Up Now link in the [employer] box. Enter your [employer] Activation Code exactly as it appears below along with your Zip Code and Date of Birth to complete the sign-up process.  If you do You don’t need to join a gym. Home exercises work great.  Put more priority on exercise in your life                    Visit [facility] online at  [email or web address].tn

## (undated) NOTE — MR AVS SNAPSHOT
[facility] [address]  [address] [place]  [phone]               Thank you for choosing us for your health care visit with Brittany Sender, GLORIA. We are glad to serve you and happy to provide you with this summary of your visit.   Ple kidney disease or have ever had a stomach ulcer or gastrointestinal bleeding, talk with your healthcare provider before using these medicines.  Also talk to your provider if you are taking medicine to prevent blood clots.) Aspirin should never be given to a © 8812-7607 [address], [address] [place]. All rights reserved. This information is not intended as a substitute for professional medical care. Always follow your healthcare professional's instructions.         Broncho Follow up with your healthcare provider, or as advised.   Note: If you are age 72 or older, have a chronic lung disease or condition that affects your immune system, or you smoke, we recommend getting pneumococcal vaccinations, as well as an influenza vacci mucus. It also may promote sinus drainage. · Heat may help soothe painful areas of the face. Use a towel soaked in hot water. Or,  the shower and direct the hot spray onto your face.  Using a vaporizer along with a menthol rub at night may also hel · Vision problems, including blurred or double vision  · Fever of 100.4ºF (38ºC) or higher, or as directed by your healthcare provider  · Seizure  · Breathing problems  · Symptoms not resolving within 10 days  Date Last Reviewed: 4/13/2015  © 4860-6179 The Commonly known as:  Taina Cody                Where to Get Your Medications      These medications were sent to [address], [address], [phone], [address] [facility]

## (undated) NOTE — LETTER
4/11/2024    Tonio Wyatt  [address] [place] [address]    Dear Tonio,    We would like to inform you that your account has been charged $40 for not showing up to the office for your scheduled appointment on 4/10/2024.    Our no-show policy is as follows: A 24-hour notice is required, or you may be charged a $40 No Show fee.      If you are unable to keep your scheduled appointment, please notify us at least 24 hours in advance so we can accommodate our other patients. You may also reschedule your appointment at that time.    On the third no-show, within a 12-month period, it will be the physician’s discretion as to whether a discharge letter will be sent out disengaging you from the practice and giving you 30 days to enroll with a new non [facility] physician.    If you need any assistance in attending your appointments, please call Patient Experience at [phone] so that we may help you identify possible solutions.    Sincerely,  [facility]

## (undated) NOTE — LETTER
Your patient was recently seen at the [facility] for a hospital follow-up visit. The visit note is attached. Please contact the clinic with any questions at [phone].     Thank you,  BHAVANA Abbott